# Patient Record
Sex: MALE | Race: WHITE | NOT HISPANIC OR LATINO | Employment: OTHER | URBAN - METROPOLITAN AREA
[De-identification: names, ages, dates, MRNs, and addresses within clinical notes are randomized per-mention and may not be internally consistent; named-entity substitution may affect disease eponyms.]

---

## 2017-01-22 ENCOUNTER — HOSPITAL ENCOUNTER (EMERGENCY)
Facility: CLINIC | Age: 72
Discharge: HOME | End: 2017-01-22
Attending: FAMILY MEDICINE | Admitting: FAMILY MEDICINE
Payer: MEDICARE

## 2017-01-22 ENCOUNTER — APPOINTMENT (OUTPATIENT)
Dept: CT IMAGING | Facility: CLINIC | Age: 72
End: 2017-01-22
Attending: FAMILY MEDICINE
Payer: MEDICARE

## 2017-01-22 VITALS
DIASTOLIC BLOOD PRESSURE: 79 MMHG | HEIGHT: 69 IN | BODY MASS INDEX: 33.77 KG/M2 | WEIGHT: 228 LBS | OXYGEN SATURATION: 96 % | TEMPERATURE: 98 F | SYSTOLIC BLOOD PRESSURE: 111 MMHG

## 2017-01-22 DIAGNOSIS — K21.9 GASTROESOPHAGEAL REFLUX DISEASE, ESOPHAGITIS PRESENCE NOT SPECIFIED: ICD-10-CM

## 2017-01-22 LAB
ALBUMIN SERPL-MCNC: 3.4 G/DL (ref 3.4–5)
ALP SERPL-CCNC: 79 U/L (ref 40–150)
ALT SERPL W P-5'-P-CCNC: 30 U/L (ref 0–70)
ANION GAP SERPL CALCULATED.3IONS-SCNC: 7 MMOL/L (ref 3–14)
APTT PPP: 32 SEC (ref 22–37)
AST SERPL W P-5'-P-CCNC: 17 U/L (ref 0–45)
BASOPHILS # BLD AUTO: 0 10E9/L (ref 0–0.2)
BASOPHILS NFR BLD AUTO: 0.6 %
BILIRUB SERPL-MCNC: 0.4 MG/DL (ref 0.2–1.3)
BUN SERPL-MCNC: 15 MG/DL (ref 7–30)
CALCIUM SERPL-MCNC: 9 MG/DL (ref 8.5–10.1)
CHLORIDE SERPL-SCNC: 108 MMOL/L (ref 94–109)
CO2 SERPL-SCNC: 28 MMOL/L (ref 20–32)
CREAT SERPL-MCNC: 1.02 MG/DL (ref 0.66–1.25)
CRP SERPL-MCNC: <2.9 MG/L (ref 0–8)
DIFFERENTIAL METHOD BLD: NORMAL
EOSINOPHIL # BLD AUTO: 0.2 10E9/L (ref 0–0.7)
EOSINOPHIL NFR BLD AUTO: 4.5 %
ERYTHROCYTE [DISTWIDTH] IN BLOOD BY AUTOMATED COUNT: 12.4 % (ref 10–15)
GFR SERPL CREATININE-BSD FRML MDRD: 72 ML/MIN/1.7M2
GLUCOSE SERPL-MCNC: 155 MG/DL (ref 70–99)
HCT VFR BLD AUTO: 40.4 % (ref 40–53)
HGB BLD-MCNC: 14 G/DL (ref 13.3–17.7)
IMM GRANULOCYTES # BLD: 0 10E9/L (ref 0–0.4)
IMM GRANULOCYTES NFR BLD: 0.2 %
INR PPP: 1.1 (ref 0.86–1.14)
LIPASE SERPL-CCNC: 103 U/L (ref 73–393)
LYMPHOCYTES # BLD AUTO: 1.9 10E9/L (ref 0.8–5.3)
LYMPHOCYTES NFR BLD AUTO: 35 %
MCH RBC QN AUTO: 31 PG (ref 26.5–33)
MCHC RBC AUTO-ENTMCNC: 34.7 G/DL (ref 31.5–36.5)
MCV RBC AUTO: 89 FL (ref 78–100)
MONOCYTES # BLD AUTO: 0.5 10E9/L (ref 0–1.3)
MONOCYTES NFR BLD AUTO: 8.5 %
NEUTROPHILS # BLD AUTO: 2.7 10E9/L (ref 1.6–8.3)
NEUTROPHILS NFR BLD AUTO: 51.2 %
NT-PROBNP SERPL-MCNC: 56 PG/ML (ref 0–900)
PLATELET # BLD AUTO: 176 10E9/L (ref 150–450)
POTASSIUM SERPL-SCNC: 4 MMOL/L (ref 3.4–5.3)
PROT SERPL-MCNC: 7 G/DL (ref 6.8–8.8)
RBC # BLD AUTO: 4.52 10E12/L (ref 4.4–5.9)
SODIUM SERPL-SCNC: 143 MMOL/L (ref 133–144)
TROPONIN I SERPL-MCNC: NORMAL UG/L (ref 0–0.04)
WBC # BLD AUTO: 5.3 10E9/L (ref 4–11)

## 2017-01-22 PROCEDURE — 93005 ELECTROCARDIOGRAM TRACING: CPT

## 2017-01-22 PROCEDURE — 85025 COMPLETE CBC W/AUTO DIFF WBC: CPT | Performed by: FAMILY MEDICINE

## 2017-01-22 PROCEDURE — 25500064 ZZH RX 255 OP 636: Performed by: FAMILY MEDICINE

## 2017-01-22 PROCEDURE — 99284 EMERGENCY DEPT VISIT MOD MDM: CPT | Mod: 25 | Performed by: FAMILY MEDICINE

## 2017-01-22 PROCEDURE — 80053 COMPREHEN METABOLIC PANEL: CPT | Performed by: FAMILY MEDICINE

## 2017-01-22 PROCEDURE — 93010 ELECTROCARDIOGRAM REPORT: CPT | Performed by: FAMILY MEDICINE

## 2017-01-22 PROCEDURE — 99285 EMERGENCY DEPT VISIT HI MDM: CPT | Mod: 25

## 2017-01-22 PROCEDURE — 83690 ASSAY OF LIPASE: CPT | Performed by: FAMILY MEDICINE

## 2017-01-22 PROCEDURE — 25000132 ZZH RX MED GY IP 250 OP 250 PS 637: Mod: GY | Performed by: FAMILY MEDICINE

## 2017-01-22 PROCEDURE — A9270 NON-COVERED ITEM OR SERVICE: HCPCS | Mod: GY | Performed by: FAMILY MEDICINE

## 2017-01-22 PROCEDURE — 83880 ASSAY OF NATRIURETIC PEPTIDE: CPT | Performed by: FAMILY MEDICINE

## 2017-01-22 PROCEDURE — 86140 C-REACTIVE PROTEIN: CPT | Performed by: FAMILY MEDICINE

## 2017-01-22 PROCEDURE — 84484 ASSAY OF TROPONIN QUANT: CPT | Performed by: FAMILY MEDICINE

## 2017-01-22 PROCEDURE — 85730 THROMBOPLASTIN TIME PARTIAL: CPT | Performed by: FAMILY MEDICINE

## 2017-01-22 PROCEDURE — 25000125 ZZHC RX 250: Performed by: FAMILY MEDICINE

## 2017-01-22 PROCEDURE — 71260 CT THORAX DX C+: CPT

## 2017-01-22 PROCEDURE — 85610 PROTHROMBIN TIME: CPT | Performed by: FAMILY MEDICINE

## 2017-01-22 RX ORDER — IOPAMIDOL 755 MG/ML
80 INJECTION, SOLUTION INTRAVASCULAR ONCE
Status: COMPLETED | OUTPATIENT
Start: 2017-01-22 | End: 2017-01-22

## 2017-01-22 RX ADMIN — SODIUM CHLORIDE 75 ML: 9 INJECTION, SOLUTION INTRAVENOUS at 17:35

## 2017-01-22 RX ADMIN — LIDOCAINE HYDROCHLORIDE 30 ML: 20 SOLUTION ORAL; TOPICAL at 16:49

## 2017-01-22 RX ADMIN — IOPAMIDOL 80 ML: 755 INJECTION, SOLUTION INTRAVENOUS at 17:35

## 2017-01-22 ASSESSMENT — ENCOUNTER SYMPTOMS
NEUROLOGICAL NEGATIVE: 1
BACK PAIN: 1
RESPIRATORY NEGATIVE: 1
GASTROINTESTINAL NEGATIVE: 1
EYES NEGATIVE: 1
ALLERGIC/IMMUNOLOGIC NEGATIVE: 1
CONSTITUTIONAL NEGATIVE: 1
ENDOCRINE NEGATIVE: 1
PSYCHIATRIC NEGATIVE: 1
HEMATOLOGIC/LYMPHATIC NEGATIVE: 1

## 2017-01-22 NOTE — ED AVS SNAPSHOT
[facility]    [address] [address]    Phone:  [phone]    Fax:  [phone]                                       Pancho Najera   MRN: [id number]    Department:  [facility]   Date of Visit:  1/22/2017           After Visit Summary Signature Page     I have received my discharge instructions, and my questions have been answered. I have discussed any challenges I see with this plan with the nurse or doctor.    ..........................................................................................................................................  Patient/Patient Representative Signature      ..........................................................................................................................................  Patient Representative Print Name and Relationship to Patient    ..................................................               ................................................  Date                                            Time    ..........................................................................................................................................  Reviewed by Signature/Title    ...................................................              ..............................................  Date                                                            Time

## 2017-01-22 NOTE — ED AVS SNAPSHOT
[facility]    [address] [address]    Phone:  [phone]    Fax:  [phone]                                       Pancho Najera   MRN: [id number]    Department:  [facility]   Date of Visit:  1/22/2017           Patient Information     Date Of Birth          1945        Your diagnoses for this visit were:     Gastroesophageal reflux disease, esophagitis presence not specified        You were seen by Adonay Ware MD.      Follow-up Information     Schedule an appointment as soon as possible for a visit with Baudilio Brown.    Specialty:  Family Practice    Why:  As needed, If symptoms worsen    Contact information:    [facility]  [address]  [place] [address]  [phone]          Discharge Instructions         Discharge Instructions for Gastroesophageal Reflux Disease (GERD)  Gastroesophageal reflux disease (GERD) is a backflow of acid from the stomach into the swallowing tube (esophagus).  Home care  These home care steps can help you manage GERD:    Maintain a healthy weight. Get help to lose any extra pounds.    Avoid lying down after meals.    Avoid eating late at night.    Elevate the head of your bed by 6 inches. You can do this by placing wooden blocks under the head of your bed.    Avoid wearing tight-fitting clothes.    Avoid foods that might irritate your stomach, such as the following:    Alcohol    Fat    Chocolate    Caffeine    Spearmint or peppermint    Talk to your doctor if you are taking any of the following medications. These medications can make GERD symptoms worse:    Calcium channel blockers    Theophylline    Anticholinergic medications such as oxybutynin and benzatropine    Begin an exercise program. Ask your doctor how to get started. You can benefit from simple activities, such as walking or gardening.    Break the smoking habit. Enroll in a stop-smoking program  to improve your chances of success.    Limit alcohol intake to no more than 2 drinks a day.    Take your medications exactly as directed. Don t skip doses.    Avoid over-the-counter nonsteroidal anti-inflammatory drugs, such as aspirin and ibuprofen (Advil, Motrin).    If possible, avoid nitrates (heart medications such as nitroglycerin and Isordil).  Follow-up care  Make a follow-up appointment as directed by our staff.     When to seek medical care  Call your doctor immediately if you have any of the following:    Trouble swallowing    Pain when swallowing    Feeling of food caught in your chest or throat    Pain in the neck, chest, or back    Heartburn that causes you to vomit    Vomiting blood    Black or tarry stools (from digested blood)    More saliva (watering of the mouth) than usual    Weight loss of more than 3% to 5% of your total body weight in a month    Hoarseness or sore throat that won t go away    Choking, coughing, or wheezing     4622-3722 [employer]. [address] [place] [address], [place] [address]. All rights reserved. This information is not intended as a substitute for professional medical care. Always follow your healthcare professional's instructions.      Please contact your primary care provider to arrange upper GI endoscopy as we discussed.  He may try going back on Protonix.  Return to the emergency department if worse or changes.      24 Hour Appointment Hotline       To make an appointment at any [facility], call [phone] ([phone]). If you don't have a family doctor or clinic, we will help you find one. [facility] are conveniently located to serve the needs of you and your family.             Review of your medicines      Our records show that you are taking the medicines listed below. If these are incorrect, please call your family doctor or clinic.        Dose / Directions Last dose taken    ACETYL L-CARNITINE PO   Dose:  800 mg        Take 800 mg by  mouth 2 times daily   Refills:  0        ALPHA LIPOIC ACID PO   Dose:  400 mg        Take 400 mg by mouth 2 times daily   Refills:  0        amitriptyline 25 MG tablet   Commonly known as:  ELAVIL   Dose:  12.5 mg   Quantity:  90 tablet        Take 0.5 tablets (12.5 mg) by mouth At Bedtime   Refills:  1        ANTIVERT 25 MG tablet   Dose:  25 mg   Generic drug:  meclizine        Take 25 mg by mouth 3 times daily as needed   Refills:  0        BIOTIN PO   Dose:  73344 mg        Take 10,000 mg by mouth daily   Refills:  0        clonazePAM 0.5 MG tablet   Commonly known as:  klonoPIN   Dose:  0.5 mg        Take 0.5 mg by mouth 2 times daily as needed   Refills:  0        CO Q-10 PLUS L-CARNITINE 100- MG-UNIT-MG Caps   Dose:  200 mg   Generic drug:  Levocarnitine-E-Coenzyme Q10        Take 200 mg by mouth daily   Refills:  0        CYCLOBENZAPRINE HCL PO        Refills:  0        GLIMEPIRIDE PO        Refills:  0        LIPITOR PO        Refills:  0        PROBIOTIC PO        Take by mouth daily   Refills:  0        PROTONIX PO        Refills:  0        TYLENOL PO   Dose:  500 mg        Take 500 mg by mouth 2 times daily as needed   Refills:  0        ZESTRIL PO        Refills:  0        ZOFRAN 4 MG tablet   Generic drug:  ondansetron        Take by mouth every 8 hours as needed   Refills:  0                Procedures and tests performed during your visit     CBC with platelets differential    CRP inflammation    CT Chest w Contrast    Comprehensive metabolic panel    EKG 12 lead    INR    Lipase    Nt probnp inpatient (BNP)    Partial thromboplastin time    Troponin I      Orders Needing Specimen Collection     None      Pending Results     Date and Time Order Name Status Description    1/22/2017 1636 CT Chest w Contrast Preliminary             Pending Culture Results     No orders found from 1/21/2017 to 1/23/2017.       Test Results from your hospital stay           1/22/2017  5:04 PM - Interface, Ann  Results      Component Results     Component Value Ref Range & Units Status    WBC 5.3 4.0 - 11.0 10e9/L Final    RBC Count 4.52 4.4 - 5.9 10e12/L Final    Hemoglobin 14.0 13.3 - 17.7 g/dL Final    Hematocrit 40.4 40.0 - 53.0 % Final    MCV 89 78 - 100 fl Final    MCH 31.0 26.5 - 33.0 pg Final    MCHC 34.7 31.5 - 36.5 g/dL Final    RDW 12.4 10.0 - 15.0 % Final    Platelet Count 176 150 - 450 10e9/L Final    Diff Method Automated Method  Final    % Neutrophils 51.2 % Final    % Lymphocytes 35.0 % Final    % Monocytes 8.5 % Final    % Eosinophils 4.5 % Final    % Basophils 0.6 % Final    % Immature Granulocytes 0.2 % Final    Absolute Neutrophil 2.7 1.6 - 8.3 10e9/L Final    Absolute Lymphocytes 1.9 0.8 - 5.3 10e9/L Final    Absolute Monocytes 0.5 0.0 - 1.3 10e9/L Final    Absolute Eosinophils 0.2 0.0 - 0.7 10e9/L Final    Absolute Basophils 0.0 0.0 - 0.2 10e9/L Final    Abs Immature Granulocytes 0.0 0 - 0.4 10e9/L Final         1/22/2017  5:16 PM - Interface, Flexilab Results      Component Results     Component Value Ref Range & Units Status    CRP Inflammation <2.9 0.0 - 8.0 mg/L Final         1/22/2017  4:59 PM - Interface, Flexilab Results      Component Results     Component Value Ref Range & Units Status    INR 1.10 0.86 - 1.14 Final         1/22/2017  4:59 PM - Interface, Flexilab Results      Component Results     Component Value Ref Range & Units Status    PTT 32 22 - 37 sec Final         1/22/2017  5:16 PM - Interface, Flexilab Results      Component Results     Component Value Ref Range & Units Status    Sodium 143 133 - 144 mmol/L Final    Potassium 4.0 3.4 - 5.3 mmol/L Final    Chloride 108 94 - 109 mmol/L Final    Carbon Dioxide 28 20 - 32 mmol/L Final    Anion Gap 7 3 - 14 mmol/L Final    Glucose 155 (H) 70 - 99 mg/dL Final    Urea Nitrogen 15 7 - 30 mg/dL Final    Creatinine 1.02 0.66 - 1.25 mg/dL Final    GFR Estimate 72 >60 mL/min/1.7m2 Final    Non  GFR Calc    GFR Estimate If Black  87 >60 mL/min/1.7m2 Final    African American GFR Calc    Calcium 9.0 8.5 - 10.1 mg/dL Final    Bilirubin Total 0.4 0.2 - 1.3 mg/dL Final    Albumin 3.4 3.4 - 5.0 g/dL Final    Protein Total 7.0 6.8 - 8.8 g/dL Final    Alkaline Phosphatase 79 40 - 150 U/L Final    ALT 30 0 - 70 U/L Final    AST 17 0 - 45 U/L Final         1/22/2017  5:16 PM - Interface, Flexilab Results      Component Results     Component Value Ref Range & Units Status    Lipase 103 73 - 393 U/L Final         1/22/2017  5:16 PM - Interface, Flexilab Results      Component Results     Component Value Ref Range & Units Status    Troponin I ES  0.000 - 0.045 ug/L Final    <0.015  The 99th percentile for upper reference range is 0.045 ug/L.  Troponin values in   the range of 0.045 - 0.120 ug/L may be associated with risks of adverse   clinical events.           1/22/2017  5:16 PM - Interface, Flexilab Results      Component Results     Component Value Ref Range & Units Status    N-Terminal Pro BNP Inpatient 56 0 - 900 pg/mL Final    Reference range shown and results flagged as abnormal are suggested inpatient   cut points for confirming diagnosis if CHF in an acute setting. Establishing   a   baseline value for each individual patient is useful for follow-up. An   inpatient or emergency department NT-proPBNP <300 pg/mL effectively rules out   acute CHF, with 99% negative predictive value.  The outpatient non-acute reference range for ruling out CHF is:   0-125 pg/mL (age 18 to less than 75)   0-450 pg/mL (age 75 yrs and older)           1/22/2017  5:57 PM - Interface, Radiant Ib      Narrative     CT CHEST WITH CONTRAST  1/22/2017 5:50 PM    HISTORY:  Atypical midsternal chest pain with penetration through to  the interscapular area, rule out aortic dissection.    TECHNIQUE: Scans obtained from the apices through the diaphragm with  IV contrast. 80 mL Isovue 370 injected. Radiation dose for this scan  was reduced using automated exposure control,  "adjustment of the mA  and/or kV according to patient size, or iterative reconstruction  technique.    COMPARISON:  None.    FINDINGS:  No acute thoracic aortic abnormality. No aortic dissection  identified. No evidence for pulmonary embolism. Coronary artery  calcifications identified. No effusions identified. No adenopathy or  acute airspace disease. Groundglass nodule is 0.4 cm left lower lobe  series 5 image 40. A more solid-appearing pulmonary nodule medial  right upper lobe is 0.5 cm series 5 image 32. Upper abdomen images are  negative for any acute finding. Left renal cyst noted.        Impression     IMPRESSION:   1. No acute thoracic aortic abnormality, pulmonary embolism, or acute  airspace disease. No evidence for aortic dissection.  2. A few small pulmonary nodules. See below for follow up  recommendation.    For an indeterminate lung nodule  >4-6 mm:  Low risk patients: Follow-up CT at 12 months; if unchanged, no further  follow-up.  High risk patients: Initial follow-up CT at 6-12 months then at 18-24  months if no change.    - Low Risk: Minimal or absent history of smoking and of other known  risk factors.  - Nonsolid (ground glass) or partly solid nodules may require longer  follow-up to exclude indolent adenocarcinoma.  - Fleischner Society Recommendations, Radiology 2005.                  Thank you for choosing [facility]       Thank you for choosing [facility] for your care. Our goal is always to provide you with excellent care. Hearing back from our patients is one way we can continue to improve our services. Please take a few minutes to complete the written survey that you may receive in the mail after you visit with us. Thank you!        [employer] Information     [employer] lets you send messages to your doctor, view your test results, renew your prescriptions, schedule appointments and more. To sign up, go to [email or web address] Click on \"Log in\" on the left side of the screen, which will take " "you to the Welcome page. Then click on \"Sign up Now\" on the right side of the page.     You will be asked to enter the access code listed below, as well as some personal information. Please follow the directions to create your username and password.     Your access code is: [email or web address]  Expires: 2017  7:23 PM     Your access code will  in 90 days. If you need help or a new code, please call your [facility] clinic or [phone].        Care EveryWhere ID     This is your Care EveryWhere ID. This could be used by other organizations to access your [facility] medical records  [id number]        After Visit Summary       This is your record. Keep this with you and show to your community pharmacist(s) and doctor(s) at your next visit.                  "

## 2017-01-22 NOTE — ED NOTES
"Pt states the pain is now \"pinching type\" pain and worse when he pushes on area that is painful.   "

## 2017-01-22 NOTE — ED PROVIDER NOTES
History     Chief Complaint   Patient presents with     Chest Pain     woke up with midchest pain through back that has not gone away.     HPI  Pancho Najera is a 72 year old male,  past medical history is significant for depression, hypercholesterolemia, anxiety, morbid obesity, obstructive sleep apnea, presents to the emergency department with concerns of midsternal chest pain.  History is obtained from the patient and to a lesser extent his wife, he describes central midsternal chest pain with penetration through to the back intrascapular area and also involving the left lateral aspect arm awakening him from sleep this morning at around 7:00.  He did have some milder discomfort that was similar when he went to bed last night.  The pain has been more or less consistent around 5-6/10 throughout the course of today regardless of activity or exertion.  Eating and drinking do not seem to influence it.  Over the past 2-3 weeks the patient has had intermittent but daily symptoms sometimes lasting an hour to similar but without the interscapular component that seemed to occur irrespective of exercise.  He notes that he has tried tums and baking soda for his symptoms and it does seem to help.  He notes the past he was on Protonix but discontinued that a while ago (he cannot actually remember when he stopped at) as it did not seem to work.  He states he's had problems with his stomach in the past and these symptoms are similar but he was concerned this might be his heart is was more severe today.  He has had evaluation with upper GI endoscopy approximately 2 or 3 years ago at an outside gastroenterology facility and tells me that his upper GI endoscopy was notable for findings of GERD.  No ulcer or gastritis he was told.  No malignancy concerns.   He notes no shortness of air, palpitations.  He does note some vague lightheadedness when the pain is bad.  No tingling or numbness or otherwise paresthesias.  No weakness.   He does notice some burping and belching which is not unusual for him.  His wife also notes his inconsistent use of his sleep apnea machine for his obstructive sleep apnea and she definitely notices a change in his personality, that he is less well rested, more irritable as he has not been using it recently.  He thinks it's been about a week to a week and a half since he last used his CPAP.    Active Ambulatory Problems     Diagnosis Date Noted     Anxiety state 04/17/2009     Chest pain 10/22/2010     Depression 04/17/2009     Morbid obesity (H) 03/25/2016     Obstructive sleep apnea syndrome 03/25/2016     History of respiratory system disease 10/22/2010     Pleural plaque 03/25/2016     Multiple pulmonary nodules 03/25/2016     Ventricular premature beats 10/22/2010     Ventricular bigeminy 10/22/2010     Resolved Ambulatory Problems     Diagnosis Date Noted     No Resolved Ambulatory Problems     No Additional Past Medical History     No past surgical history on file.  Social History     Social History     Marital Status:      Spouse Name: N/A     Number of Children: N/A     Years of Education: N/A     Occupational History     Not on file.     Social History Main Topics     Smoking status: Never Smoker      Smokeless tobacco: Never Used     Alcohol Use: Not on file     Drug Use: Not on file     Sexual Activity: Not on file     Other Topics Concern     Not on file     Social History Narrative     No family history on file.  No current facility-administered medications for this encounter.     Current Outpatient Prescriptions   Medication     Acetaminophen (TYLENOL PO)     Levocarnitine-E-Coenzyme Q10 (CO Q-10 PLUS L-CARNITINE) 100- MG-UNIT-MG CAPS     Acetylcarnitine HCl (ACETYL L-CARNITINE PO)     ALPHA LIPOIC ACID PO     BIOTIN PO     Probiotic Product (PROBIOTIC PO)     amitriptyline (ELAVIL) 25 MG tablet     Pantoprazole Sodium (PROTONIX PO)     ondansetron (ZOFRAN) 4 MG tablet     meclizine  (ANTIVERT) 25 MG tablet     Lisinopril (ZESTRIL PO)     GLIMEPIRIDE PO     CYCLOBENZAPRINE HCL PO     clonazePAM (KLONOPIN) 0.5 MG tablet     Atorvastatin Calcium (LIPITOR PO)        Allergies   Allergen Reactions     Sulfa Drugs        I have reviewed the Medications, Allergies, Past Medical and Surgical History, and Social History in the Epic system.    Review of Systems   Constitutional: Negative.    HENT: Negative.    Eyes: Negative.    Respiratory: Negative.    Cardiovascular: Positive for chest pain.   Gastrointestinal: Negative.    Endocrine: Negative.    Genitourinary: Negative.    Musculoskeletal: Positive for back pain.   Skin: Negative.    Allergic/Immunologic: Negative.    Neurological: Negative.    Hematological: Negative.    Psychiatric/Behavioral: Negative.        Physical Exam      Physical Exam   Constitutional: He is oriented to person, place, and time. He appears well-developed and well-nourished.   HENT:   Head: Normocephalic and atraumatic.   Right Ear: External ear normal.   Left Ear: External ear normal.   Nose: Nose normal.   Mouth/Throat: Oropharynx is clear and moist.   Eyes: Conjunctivae and EOM are normal. Pupils are equal, round, and reactive to light.   Neck: Normal range of motion. Neck supple.   Cardiovascular: Normal rate, regular rhythm, normal heart sounds and intact distal pulses.    Pulmonary/Chest: Effort normal and breath sounds normal.   Abdominal: Soft. Bowel sounds are normal.   Musculoskeletal: Normal range of motion.   Neurological: He is alert and oriented to person, place, and time.   Skin: Skin is warm and dry.   Psychiatric: He has a normal mood and affect. His behavior is normal.   Nursing note and vitals reviewed.      ED Course   Procedures             EKG Interpretation:      Interpreted by Adonay Ware  Time reviewed: 15:55  Symptoms at time of EK-610 chest pain   Rhythm: normal sinus   Rate: 68  Axis: Normal  Ectopy: none  Conduction: normal  ST  Segments/ T Waves: Nonspecific QRS widening, Q waves inferiorly.  Q Waves: III and aVf  Comparison to prior: Unchanged from 7/24/10    Clinical Impression: no acute changes      Results for orders placed or performed during the hospital encounter of 01/22/17   CT Chest w Contrast    Narrative    CT CHEST WITH CONTRAST  1/22/2017 5:50 PM    HISTORY:  Atypical midsternal chest pain with penetration through to  the interscapular area, rule out aortic dissection.    TECHNIQUE: Scans obtained from the apices through the diaphragm with  IV contrast. 80 mL Isovue 370 injected. Radiation dose for this scan  was reduced using automated exposure control, adjustment of the mA  and/or kV according to patient size, or iterative reconstruction  technique.    COMPARISON:  None.    FINDINGS:  No acute thoracic aortic abnormality. No aortic dissection  identified. No evidence for pulmonary embolism. Coronary artery  calcifications identified. No effusions identified. No adenopathy or  acute airspace disease. Groundglass nodule is 0.4 cm left lower lobe  series 5 image 40. A more solid-appearing pulmonary nodule medial  right upper lobe is 0.5 cm series 5 image 32. Upper abdomen images are  negative for any acute finding. Left renal cyst noted.      Impression    IMPRESSION:   1. No acute thoracic aortic abnormality, pulmonary embolism, or acute  airspace disease. No evidence for aortic dissection.  2. A few small pulmonary nodules. See below for follow up  recommendation.    For an indeterminate lung nodule  >4-6 mm:  Low risk patients: Follow-up CT at 12 months; if unchanged, no further  follow-up.  High risk patients: Initial follow-up CT at 6-12 months then at 18-24  months if no change.    - Low Risk: Minimal or absent history of smoking and of other known  risk factors.  - Nonsolid (ground glass) or partly solid nodules may require longer  follow-up to exclude indolent adenocarcinoma.  - Fleischner Society Recommendations,  Radiology 2005.      POWER CAMILO MD               Critical Care time:  none               Labs Ordered and Resulted from Time of ED Arrival Up to the Time of Departure from the ED   COMPREHENSIVE METABOLIC PANEL - Abnormal; Notable for the following:     Glucose 155 (*)     All other components within normal limits   CBC WITH PLATELETS DIFFERENTIAL   CRP INFLAMMATION   INR   PARTIAL THROMBOPLASTIN TIME   LIPASE   TROPONIN I   NT PROBNP INPATIENT   7:09 PM  Lab diagnostics reviewed, EKG and CT reviewed with the patient and his wife.  He noted a very definite improvement, if not near complete resolution, of his symptoms with the GI cocktail.      Assessments & Plan (with Medical Decision Making)   72-year-old male past medical history reviewed above, presentation and emergency department for evaluation of chest/epigastric discomfort over the last 2-3 weeks as described in detail in the HPI.  Physical exam finds him alert and in no acute distress with mild epigastric discomfort at the time.  Physical examination is within normal limits, vital signs within normal limits, in the presence of a 5-6/10 chest pain the patient's EKG showed no acute changes.  Lab diagnostics were within normal limits with the exception of glucose 155.  Specifically noting the cardiac troponin, BNP, CRP, CBC, lipase, LFTs are all within normal limits.  The patient had a significant response to the administration of a GI cocktail.  With most if not all of the symptoms improving.  Differential diagnosis for his presentation certainly includes ACS, pneumothorax, atypical pneumonia, pulmonary embolism, aortic dissection, etc.  For these reasons we also obtain CT imaging of his chest which revealed no acute findings.  The finding of pulmonary nodule was discussed with the patient follow-up with his primary care provider was recommended.  I think the patient's presentation today stems from long-standing GERD, it sounds as though this has been  ineffectively treated and he should follow-up with his primary care provider for consideration of repeat upper GI endoscopy as he his account has failed multiple medications to control the symptoms most recently Protonix.  I think the fact that he is not using his sleep apnea machine as directed also also contributing to his presentation today and did discuss this with him.  The patient was dispositioned home in improved condition in the care of his wife.    Disclaimer: This note consists of symbols derived from keyboarding, dictation and/or voice recognition software. As a result, there may be errors in the script that have gone undetected. Please consider this when interpreting information found in this chart.      I have reviewed the nursing notes.    I have reviewed the findings, diagnosis, plan and need for follow up with the patient.    Discharge Medication List as of 1/22/2017  7:23 PM          Final diagnoses:   Gastroesophageal reflux disease, esophagitis presence not specified       1/22/2017   [facility] EMERGENCY DEPARTMENT      Adonay Ware MD  01/27/17 0609

## 2017-01-22 NOTE — ED NOTES
"Pt c/o on/off \"achy pain\"  In epigastric area and under lt breast for past few weeks.  Today pain woke pt up and was also into the lt outer arm and through to the back.  Pt also states he feels fatigued.  Slight soa.  No n/v.    "

## 2017-01-23 NOTE — DISCHARGE INSTRUCTIONS
Discharge Instructions for Gastroesophageal Reflux Disease (GERD)  Gastroesophageal reflux disease (GERD) is a backflow of acid from the stomach into the swallowing tube (esophagus).  Home care  These home care steps can help you manage GERD:    Maintain a healthy weight. Get help to lose any extra pounds.    Avoid lying down after meals.    Avoid eating late at night.    Elevate the head of your bed by 6 inches. You can do this by placing wooden blocks under the head of your bed.    Avoid wearing tight-fitting clothes.    Avoid foods that might irritate your stomach, such as the following:    Alcohol    Fat    Chocolate    Caffeine    Spearmint or peppermint    Talk to your doctor if you are taking any of the following medications. These medications can make GERD symptoms worse:    Calcium channel blockers    Theophylline    Anticholinergic medications such as oxybutynin and benzatropine    Begin an exercise program. Ask your doctor how to get started. You can benefit from simple activities, such as walking or gardening.    Break the smoking habit. Enroll in a stop-smoking program to improve your chances of success.    Limit alcohol intake to no more than 2 drinks a day.    Take your medications exactly as directed. Don t skip doses.    Avoid over-the-counter nonsteroidal anti-inflammatory drugs, such as aspirin and ibuprofen (Advil, Motrin).    If possible, avoid nitrates (heart medications such as nitroglycerin and Isordil).  Follow-up care  Make a follow-up appointment as directed by our staff.     When to seek medical care  Call your doctor immediately if you have any of the following:    Trouble swallowing    Pain when swallowing    Feeling of food caught in your chest or throat    Pain in the neck, chest, or back    Heartburn that causes you to vomit    Vomiting blood    Black or tarry stools (from digested blood)    More saliva (watering of the mouth) than usual    Weight loss of more than 3% to 5% of your  total body weight in a month    Hoarseness or sore throat that won t go away    Choking, coughing, or wheezing     3309-1617 [employer]. [address] [place] [address], [place] [address]. All rights reserved. This information is not intended as a substitute for professional medical care. Always follow your healthcare professional's instructions.      Please contact your primary care provider to arrange upper GI endoscopy as we discussed.  He may try going back on Protonix.  Return to the emergency department if worse or changes.

## 2017-07-04 ENCOUNTER — APPOINTMENT (OUTPATIENT)
Dept: GENERAL RADIOLOGY | Facility: CLINIC | Age: 72
End: 2017-07-04
Attending: EMERGENCY MEDICINE
Payer: MEDICARE

## 2017-07-04 ENCOUNTER — HOSPITAL ENCOUNTER (EMERGENCY)
Facility: CLINIC | Age: 72
Discharge: HOME | End: 2017-07-04
Attending: EMERGENCY MEDICINE | Admitting: EMERGENCY MEDICINE
Payer: MEDICARE

## 2017-07-04 VITALS
RESPIRATION RATE: 18 BRPM | OXYGEN SATURATION: 98 % | BODY MASS INDEX: 32.19 KG/M2 | TEMPERATURE: 98 F | WEIGHT: 218 LBS | SYSTOLIC BLOOD PRESSURE: 108 MMHG | DIASTOLIC BLOOD PRESSURE: 68 MMHG

## 2017-07-04 DIAGNOSIS — T14.90XA INHALATION INJURY: ICD-10-CM

## 2017-07-04 DIAGNOSIS — R06.02 SOB (SHORTNESS OF BREATH): ICD-10-CM

## 2017-07-04 LAB
ALBUMIN SERPL-MCNC: 3.7 G/DL (ref 3.4–5)
ALP SERPL-CCNC: 98 U/L (ref 40–150)
ALT SERPL W P-5'-P-CCNC: 25 U/L (ref 0–70)
ANION GAP SERPL CALCULATED.3IONS-SCNC: 6 MMOL/L (ref 3–14)
AST SERPL W P-5'-P-CCNC: 15 U/L (ref 0–45)
BASE EXCESS BLDV CALC-SCNC: 3.1 MMOL/L
BASOPHILS # BLD AUTO: 0 10E9/L (ref 0–0.2)
BASOPHILS NFR BLD AUTO: 0.2 %
BILIRUB SERPL-MCNC: 0.7 MG/DL (ref 0.2–1.3)
BUN SERPL-MCNC: 19 MG/DL (ref 7–30)
CALCIUM SERPL-MCNC: 9.5 MG/DL (ref 8.5–10.1)
CHLORIDE SERPL-SCNC: 104 MMOL/L (ref 94–109)
CO2 SERPL-SCNC: 27 MMOL/L (ref 20–32)
CREAT SERPL-MCNC: 1.18 MG/DL (ref 0.66–1.25)
CRP SERPL-MCNC: 34.2 MG/L (ref 0–8)
DIFFERENTIAL METHOD BLD: NORMAL
EOSINOPHIL # BLD AUTO: 0.2 10E9/L (ref 0–0.7)
EOSINOPHIL NFR BLD AUTO: 2.1 %
ERYTHROCYTE [DISTWIDTH] IN BLOOD BY AUTOMATED COUNT: 12.4 % (ref 10–15)
GFR SERPL CREATININE-BSD FRML MDRD: 61 ML/MIN/1.7M2
GLUCOSE SERPL-MCNC: 140 MG/DL (ref 70–99)
HCO3 BLDV-SCNC: 29 MMOL/L (ref 21–28)
HCT VFR BLD AUTO: 40.8 % (ref 40–53)
HGB BLD-MCNC: 14.1 G/DL (ref 13.3–17.7)
IMM GRANULOCYTES # BLD: 0 10E9/L (ref 0–0.4)
IMM GRANULOCYTES NFR BLD: 0.1 %
LYMPHOCYTES # BLD AUTO: 2.2 10E9/L (ref 0.8–5.3)
LYMPHOCYTES NFR BLD AUTO: 22.1 %
MCH RBC QN AUTO: 31.5 PG (ref 26.5–33)
MCHC RBC AUTO-ENTMCNC: 34.6 G/DL (ref 31.5–36.5)
MCV RBC AUTO: 91 FL (ref 78–100)
MONOCYTES # BLD AUTO: 1.1 10E9/L (ref 0–1.3)
MONOCYTES NFR BLD AUTO: 11.2 %
NEUTROPHILS # BLD AUTO: 6.5 10E9/L (ref 1.6–8.3)
NEUTROPHILS NFR BLD AUTO: 64.3 %
PCO2 BLDV: 48 MM HG (ref 40–50)
PH BLDV: 7.39 PH (ref 7.32–7.43)
PLATELET # BLD AUTO: 191 10E9/L (ref 150–450)
PO2 BLDV: 33 MM HG (ref 25–47)
POTASSIUM SERPL-SCNC: 4.1 MMOL/L (ref 3.4–5.3)
PROT SERPL-MCNC: 7.9 G/DL (ref 6.8–8.8)
RBC # BLD AUTO: 4.47 10E12/L (ref 4.4–5.9)
SODIUM SERPL-SCNC: 137 MMOL/L (ref 133–144)
TROPONIN I SERPL-MCNC: NORMAL UG/L (ref 0–0.04)
WBC # BLD AUTO: 10.1 10E9/L (ref 4–11)

## 2017-07-04 PROCEDURE — 71020 XR CHEST 2 VW: CPT

## 2017-07-04 PROCEDURE — 86140 C-REACTIVE PROTEIN: CPT | Performed by: EMERGENCY MEDICINE

## 2017-07-04 PROCEDURE — 40000275 ZZH STATISTIC RCP TIME EA 10 MIN

## 2017-07-04 PROCEDURE — 25000125 ZZHC RX 250: Performed by: EMERGENCY MEDICINE

## 2017-07-04 PROCEDURE — 82803 BLOOD GASES ANY COMBINATION: CPT | Performed by: EMERGENCY MEDICINE

## 2017-07-04 PROCEDURE — 96374 THER/PROPH/DIAG INJ IV PUSH: CPT

## 2017-07-04 PROCEDURE — 99284 EMERGENCY DEPT VISIT MOD MDM: CPT | Mod: 25

## 2017-07-04 PROCEDURE — 84484 ASSAY OF TROPONIN QUANT: CPT | Performed by: EMERGENCY MEDICINE

## 2017-07-04 PROCEDURE — 94640 AIRWAY INHALATION TREATMENT: CPT

## 2017-07-04 PROCEDURE — 80053 COMPREHEN METABOLIC PANEL: CPT | Performed by: EMERGENCY MEDICINE

## 2017-07-04 PROCEDURE — 96361 HYDRATE IV INFUSION ADD-ON: CPT | Mod: 59

## 2017-07-04 PROCEDURE — 85025 COMPLETE CBC W/AUTO DIFF WBC: CPT | Performed by: EMERGENCY MEDICINE

## 2017-07-04 PROCEDURE — 99284 EMERGENCY DEPT VISIT MOD MDM: CPT | Performed by: EMERGENCY MEDICINE

## 2017-07-04 PROCEDURE — 25000128 H RX IP 250 OP 636: Performed by: EMERGENCY MEDICINE

## 2017-07-04 RX ORDER — SODIUM CHLORIDE 9 MG/ML
1000 INJECTION, SOLUTION INTRAVENOUS CONTINUOUS
Status: DISCONTINUED | OUTPATIENT
Start: 2017-07-04 | End: 2017-07-04 | Stop reason: HOSPADM

## 2017-07-04 RX ORDER — PREDNISONE 10 MG/1
TABLET ORAL
Qty: 32 TABLET | Refills: 0 | Status: SHIPPED | OUTPATIENT
Start: 2017-07-04 | End: 2017-07-14

## 2017-07-04 RX ORDER — KETOROLAC TROMETHAMINE 30 MG/ML
15 INJECTION, SOLUTION INTRAMUSCULAR; INTRAVENOUS ONCE
Status: COMPLETED | OUTPATIENT
Start: 2017-07-04 | End: 2017-07-04

## 2017-07-04 RX ORDER — IPRATROPIUM BROMIDE AND ALBUTEROL SULFATE 2.5; .5 MG/3ML; MG/3ML
3 SOLUTION RESPIRATORY (INHALATION) ONCE
Status: COMPLETED | OUTPATIENT
Start: 2017-07-04 | End: 2017-07-04

## 2017-07-04 RX ORDER — ALBUTEROL SULFATE 90 UG/1
2 AEROSOL, METERED RESPIRATORY (INHALATION) EVERY 4 HOURS PRN
Qty: 1 INHALER | Refills: 0 | Status: SHIPPED | OUTPATIENT
Start: 2017-07-04 | End: 2021-09-02

## 2017-07-04 RX ADMIN — IPRATROPIUM BROMIDE AND ALBUTEROL SULFATE 3 ML: .5; 3 SOLUTION RESPIRATORY (INHALATION) at 13:42

## 2017-07-04 RX ADMIN — SODIUM CHLORIDE 1000 ML: 9 INJECTION, SOLUTION INTRAVENOUS at 13:40

## 2017-07-04 RX ADMIN — KETOROLAC TROMETHAMINE 15 MG: 30 INJECTION, SOLUTION INTRAMUSCULAR at 13:40

## 2017-07-04 NOTE — ED AVS SNAPSHOT
[facility] Emergency Department    [address]    [place] [address]    Phone:  [phone]    Fax:  [phone]                                       Pancho Najera   MRN: [id number]    Department:  [facility] Emergency Department   Date of Visit:  7/4/2017           Patient Information     Date Of Birth          1945        Your diagnoses for this visit were:     SOB (shortness of breath)     Inhalation injury        You were seen by Valentino Pathak MD.      Follow-up Information     Follow up with Baudilio Brown.    Specialty:  Family Practice    Contact information:    [facility]  [address]  [place] [address]  [phone]        Discharge References/Attachments     SHORTNESS OF BREATH (DYSPNEA) (ENGLISH)      24 Hour Appointment Hotline       To make an appointment at any [facility] clinic, call [phone] ([phone]). If you don't have a family doctor or clinic, we will help you find one. [facility] clinics are conveniently located to serve the needs of you and your family.             Review of your medicines      START taking        Dose / Directions Last dose taken    albuterol 108 (90 BASE) MCG/ACT Inhaler   Commonly known as:  albuterol   Dose:  2 puff   Quantity:  1 Inhaler        Inhale 2 puffs into the lungs every 4 hours as needed for shortness of breath / dyspnea   Refills:  0        predniSONE 10 MG tablet   Commonly known as:  DELTASONE   Quantity:  32 tablet        Take 4 tablets daily for 5 days,  take 2 tablets daily for 3 days, take 1 tablet daily for 3 days, take half a tablet for 3 days.   Refills:  0          Our records show that you are taking the medicines listed below. If these are incorrect, please call your family doctor or clinic.        Dose / Directions Last dose taken    clonazePAM 0.5 MG tablet   Commonly known as:  klonoPIN   Dose:  0.5 mg        Take 0.5 mg by mouth 3 times daily as needed   Refills:  0         GLIMEPIRIDE PO   Dose:  1 mg        Take 1 mg by mouth 2 times daily (before meals)   Refills:  0        LIPITOR PO   Dose:  10 mg        Take 10 mg by mouth daily   Refills:  0        PROTONIX PO   Dose:  40 mg        Take 40 mg by mouth every morning (before breakfast)   Refills:  0        TYLENOL PO   Dose:  650 mg        Take 650 mg by mouth every 4 hours as needed   Refills:  0        ZESTRIL PO   Dose:  5 mg        Take 5 mg by mouth daily   Refills:  0                Prescriptions were sent or printed at these locations (2 Prescriptions)                   [facility] [place], [place] - [address]   [address] [address]    Telephone:  [phone]   Fax:  [phone]   Hours:                  E-Prescribed (2 of 2)         predniSONE (DELTASONE) 10 MG tablet               albuterol (ALBUTEROL) 108 (90 BASE) MCG/ACT Inhaler                Procedures and tests performed during your visit     Blood gas venous    CBC with platelets differential    CRP inflammation    Comprehensive metabolic panel    Troponin I    XR Chest 2 Views      Orders Needing Specimen Collection     None      Pending Results     Date and Time Order Name Status Description    7/4/2017 1325 XR Chest 2 Views Preliminary             Pending Culture Results     No orders found from 7/2/2017 to 7/5/2017.            Pending Results Instructions     If you had any lab results that were not finalized at the time of your Discharge, you can call the ED Lab Result RN at [phone]. You will be contacted by this team for any positive Lab results or changes in treatment. The nurses are available 7 days a week from 10A to 6:30P.  You can leave a message 24 hours per day and they will return your call.        Test Results From Your Hospital Stay        7/4/2017  1:41 PM      Component Results     Component Value Ref Range & Units Status    WBC 10.1 4.0 - 11.0 10e9/L Final    RBC Count 4.47 4.4 - 5.9 10e12/L Final     Hemoglobin 14.1 13.3 - 17.7 g/dL Final    Hematocrit 40.8 40.0 - 53.0 % Final    MCV 91 78 - 100 fl Final    MCH 31.5 26.5 - 33.0 pg Final    MCHC 34.6 31.5 - 36.5 g/dL Final    RDW 12.4 10.0 - 15.0 % Final    Platelet Count 191 150 - 450 10e9/L Final    Diff Method Automated Method  Final    % Neutrophils 64.3 % Final    % Lymphocytes 22.1 % Final    % Monocytes 11.2 % Final    % Eosinophils 2.1 % Final    % Basophils 0.2 % Final    % Immature Granulocytes 0.1 % Final    Absolute Neutrophil 6.5 1.6 - 8.3 10e9/L Final    Absolute Lymphocytes 2.2 0.8 - 5.3 10e9/L Final    Absolute Monocytes 1.1 0.0 - 1.3 10e9/L Final    Absolute Eosinophils 0.2 0.0 - 0.7 10e9/L Final    Absolute Basophils 0.0 0.0 - 0.2 10e9/L Final    Abs Immature Granulocytes 0.0 0 - 0.4 10e9/L Final         7/4/2017  1:59 PM      Component Results     Component Value Ref Range & Units Status    Sodium 137 133 - 144 mmol/L Final    Potassium 4.1 3.4 - 5.3 mmol/L Final    Chloride 104 94 - 109 mmol/L Final    Carbon Dioxide 27 20 - 32 mmol/L Final    Anion Gap 6 3 - 14 mmol/L Final    Glucose 140 (H) 70 - 99 mg/dL Final    Urea Nitrogen 19 7 - 30 mg/dL Final    Creatinine 1.18 0.66 - 1.25 mg/dL Final    GFR Estimate 61 >60 mL/min/1.7m2 Final    Non  GFR Calc    GFR Estimate If Black 74 >60 mL/min/1.7m2 Final    African American GFR Calc    Calcium 9.5 8.5 - 10.1 mg/dL Final    Bilirubin Total 0.7 0.2 - 1.3 mg/dL Final    Albumin 3.7 3.4 - 5.0 g/dL Final    Protein Total 7.9 6.8 - 8.8 g/dL Final    Alkaline Phosphatase 98 40 - 150 U/L Final    ALT 25 0 - 70 U/L Final    AST 15 0 - 45 U/L Final         7/4/2017  1:59 PM      Component Results     Component Value Ref Range & Units Status    Troponin I ES  0.000 - 0.045 ug/L Final    <0.015  The 99th percentile for upper reference range is 0.045 ug/L.  Troponin values in   the range of 0.045 - 0.120 ug/L may be associated with risks of adverse   clinical events.           7/4/2017  2:10  "PM      Component Results     Component Value Ref Range & Units Status    Ph Venous 7.39 7.32 - 7.43 pH Final    PCO2 Venous 48 40 - 50 mm Hg Final    PO2 Venous 33 25 - 47 mm Hg Final    Bicarbonate Venous 29 (H) 21 - 28 mmol/L Final    Base Excess Venous 3.1 mmol/L Final    Abnormal Result, Ref range: -7.7 to 1.9         2017  1:59 PM      Component Results     Component Value Ref Range & Units Status    CRP Inflammation 34.2 (H) 0.0 - 8.0 mg/L Final         2017  2:08 PM      Narrative     CHEST TWO VIEWS 2017 2:05 PM     HISTORY: Exposure to toxic gas with cough and shortness of  breath/history of pleural plaque from asbestosis.    COMPARISON: None.        Impression     IMPRESSION: No acute cardiopulmonary disease.                Thank you for choosing [facility]       Thank you for choosing [facility] for your care. Our goal is always to provide you with excellent care. Hearing back from our patients is one way we can continue to improve our services. Please take a few minutes to complete the written survey that you may receive in the mail after you visit with us. Thank you!        [employer] Information     [employer] lets you send messages to your doctor, view your test results, renew your prescriptions, schedule appointments and more. To sign up, go to [email or web address] . Click on \"Log in\" on the left side of the screen, which will take you to the Welcome page. Then click on \"Sign up Now\" on the right side of the page.     You will be asked to enter the access code listed below, as well as some personal information. Please follow the directions to create your username and password.     Your access code is: [email or web address]  Expires: 10/2/2017  2:33 PM     Your access code will  in 90 days. If you need help or a new code, please call your [facility] clinic or [phone].        Care EveryWhere ID     This is your Care EveryWhere ID. This could be used by other organizations to access your " [facility] medical records  [id number]        Equal Access to Services     CLAIR LORD : Hadii sussy Chappell, silvana tai, ruel whitmore, william strickland. So [facility] [phone].    ATENCIÓN: Si habla español, tiene a louis disposición servicios gratuitos de asistencia lingüística. Llame al [phone].    We comply with applicable federal civil rights laws and [place] laws. We do not discriminate on the basis of race, color, national origin, age, disability sex, sexual orientation or gender identity.            After Visit Summary       This is your record. Keep this with you and show to your community pharmacist(s) and doctor(s) at your next visit.

## 2017-07-04 NOTE — ED AVS SNAPSHOT
[facility] Emergency Department    [address] [address]    Phone:  [phone]    Fax:  [phone]                                       Pancho Najera   MRN: [id number]    Department:  [facility] Emergency Department   Date of Visit:  7/4/2017           After Visit Summary Signature Page     I have received my discharge instructions, and my questions have been answered. I have discussed any challenges I see with this plan with the nurse or doctor.    ..........................................................................................................................................  Patient/Patient Representative Signature      ..........................................................................................................................................  Patient Representative Print Name and Relationship to Patient    ..................................................               ................................................  Date                                            Time    ..........................................................................................................................................  Reviewed by Signature/Title    ...................................................              ..............................................  Date                                                            Time

## 2017-07-04 NOTE — ED PROVIDER NOTES
History     Chief Complaint   Patient presents with     Toxic Inhalation     boric acid, was used in his home by him on Sunday and then yesterday he washed everything down with bleach to get rid of the boric acid due to company coming today, now having sore throat, ear pain, h/a and rough voice with burning into his chest      HPI  Pancho Najera is a 72 year old male who presents with complaints of ear pain, nasal congestion, sore throat, and burning in his chest.  Patient has associated headache and cough which is productive of clear sputum.  No fever or chills.  His abdominal pain, nausea or vomiting.  Symptoms began on Sunday after he is exposed to a chemical gas.  Patient apparently used boric acid to eliminate ants.  He subsequently used bleach to eliminate the boric acid as they were having company.  He unfortunately inhaled some of the gas that was produced and now presents for evaluation.  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  Denies history of pneumonia.  He has not taken anything for his symptoms prior to arrival.  He contacted poison control and they recommended he be evaluated and possibly treated for his breathing issues.  In addition to his pleural plaques he has sleep apnea, ventricular bigeminy, anxiety, and previous evaluation for chest pain.    I have reviewed the Medications, Allergies, Past Medical and Surgical History, and Social History in the Epic system.    Review of Systems all other systems were reviewed and are negative.  History reviewed. No pertinent past medical history.  Patient Active Problem List   Diagnosis     Anxiety state     Chest pain     Depression     Morbid obesity (H)     Obstructive sleep apnea syndrome     History of respiratory system disease     Pleural plaque     Multiple pulmonary nodules     Ventricular premature beats     Ventricular bigeminy     Current Facility-Administered Medications   Medication      0.9% sodium chloride infusion     Current Outpatient Prescriptions   Medication     predniSONE (DELTASONE) 10 MG tablet     albuterol (ALBUTEROL) 108 (90 BASE) MCG/ACT Inhaler     Acetaminophen (TYLENOL PO)     Pantoprazole Sodium (PROTONIX PO)     Lisinopril (ZESTRIL PO)     GLIMEPIRIDE PO     clonazePAM (KLONOPIN) 0.5 MG tablet     Atorvastatin Calcium (LIPITOR PO)        Allergies   Allergen Reactions     Sulfa Drugs      Social History     Social History     Marital status:      Spouse name: N/A     Number of children: N/A     Years of education: N/A     Occupational History     Not on file.     Social History Main Topics     Smoking status: Never Smoker     Smokeless tobacco: Never Used     Alcohol use Not on file     Drug use: Not on file     Sexual activity: Not on file     Other Topics Concern     Not on file     Social History Narrative     No family history on file.        Physical Exam   BP: 108/68  Heart Rate: 74  Temp: 98  F (36.7  C)  Resp: (!) 77  Weight: 98.9 kg (218 lb)  SpO2: 97 %  Physical Exam Gen. alert cooperative male in mild to moderate distress.  Does have a nonproductive cough during exam.  HEENT shows no obvious facial rash or swelling.  His right TM is normal.  The left TM is partially visualized due to cerumen and that appears normal.  Nasal passes are boggy and swollen near occlusion.  Clear drainage bilaterally.  Moist mucosa.  Speech is clear.  He's got postnasal drip.  He is able to handle secretions.  Neck is supple without stridor.  Lungs reveal rare basilar crackles but no other adventitious sounds.  Cardiac regular rate without murmur.    ED Course     ED Course     Procedures        Results for orders placed or performed during the hospital encounter of 07/04/17   XR Chest 2 Views    Narrative    CHEST TWO VIEWS July 4, 2017 2:05 PM     HISTORY: Exposure to toxic gas with cough and shortness of  breath/history of pleural plaque from asbestosis.    COMPARISON: None.       Impression    IMPRESSION: No acute cardiopulmonary disease.          Critical Care time:  none               Labs Ordered and Resulted from Time of ED Arrival Up to the Time of Departure from the ED   COMPREHENSIVE METABOLIC PANEL - Abnormal; Notable for the following:        Result Value    Glucose 140 (*)     All other components within normal limits   BLOOD GAS VENOUS - Abnormal; Notable for the following:     Bicarbonate Venous 29 (*)     All other components within normal limits   CRP INFLAMMATION - Abnormal; Notable for the following:     CRP Inflammation 34.2 (*)     All other components within normal limits   CBC WITH PLATELETS DIFFERENTIAL   TROPONIN I     Patient was given a DuoNeb with improvement.  Assessments & Plan (with Medical Decision Making)   Patient is a 72-year-old presents with treated history of ear pain, nasal congestion, sore throat, and cough with burning in his chest.  On Sunday patient was exposed to a toxic gas from mixture of boric acid and bleach.  He does have a history of asbestosis and pleural plaques.  He currently has a cough that is productive of clear sputum.  He has no fever or chills.  No chest pain.  He is able to speak in complete sentences and able to handle secretions.  On exam he had normal TMs with the left being partially visualized.  Nasal swelling near occlusion with clear discharge.  Postnasal drip on oral exam.  Neck was supple without stridor.  Lungs revealed no wheezing.  Patient was given a DuoNeb with improvement.  A chest x-ray showed no acute abnormality.  Blood work was unremarkable including troponin and his venous blood gas.  CRP was mildly elevated at 34.2 Suspect he has irritation from the gas exposure.  He'll be given prednisone burst and taper.  He understands this can cause GI upset and insomnia.  He'll be given an inhaler as it seemed to benefit to use as needed.  He understands this can cause his heart duration and cause him to be jittery.  She is  given had recurrent dyspnea.  Reasons to return to the emergency room for reassessment were discussed.  I have reviewed the nursing notes.    I have reviewed the findings, diagnosis, plan and need for follow up with the patient.       New Prescriptions    ALBUTEROL (ALBUTEROL) 108 (90 BASE) MCG/ACT INHALER    Inhale 2 puffs into the lungs every 4 hours as needed for shortness of breath / dyspnea    PREDNISONE (DELTASONE) 10 MG TABLET    Take 4 tablets daily for 5 days,  take 2 tablets daily for 3 days, take 1 tablet daily for 3 days, take half a tablet for 3 days.       Final diagnoses:   SOB (shortness of breath)   Inhalation injury       7/4/2017   [facility] EMERGENCY DEPARTMENT     Valentino Pathak MD  07/04/17 [id number]       Valentino Pathak MD  07/04/17 [id number]

## 2021-05-14 ENCOUNTER — TRANSFERRED RECORDS (OUTPATIENT)
Dept: MULTI SPECIALTY CLINIC | Facility: CLINIC | Age: 76
End: 2021-05-14

## 2021-05-14 LAB
ALBUMIN (URINE) MG/SPEC: 0.6 MG/DL
ALBUMIN/CREATININE RATIO: 5.9 MG/G CREAT
ALT SERPL-CCNC: 38 IU/L (ref 12–68)
AST SERPL-CCNC: 17 IU/L (ref 15–37)
CHOLESTEROL (EXTERNAL): 136 MG/DL
CREATININE (EXTERNAL): 1.27 MG/DL (ref 0.8–1.3)
CREATININE (URINE): 101 MG/DL
GFR ESTIMATED (EXTERNAL): 55 ML/MIN
GFR ESTIMATED (IF AFRICAN AMERICAN) (EXTERNAL): >60 ML/MIN
GLUCOSE (EXTERNAL): 85 MG/DL (ref 70–110)
HBA1C MFR BLD: 6.7 %
HDLC SERPL-MCNC: 51 MG/DL
LDL CHOLESTEROL (EXTERNAL): 68 MG/DL
POTASSIUM (EXTERNAL): 4.5 MMOL/L (ref 3.5–5.1)
TRIGLYCERIDES (EXTERNAL): 84 MG/DL

## 2021-08-19 ENCOUNTER — TELEPHONE (OUTPATIENT)
Dept: OTOLARYNGOLOGY | Facility: CLINIC | Age: 76
End: 2021-08-19

## 2021-08-19 NOTE — TELEPHONE ENCOUNTER
"Spoke to patient and advised to see his PCP as he has not been vaccinated for Covid and has lung issues and he is reporting \"a cough for about 2 months and my cheek started to look swollen in the back by my ear a couple weeks ago. About a week ago, it was really swollen and it hurts into my neck, eye ear and into my neck. I have had tonsil problems before, so I gargled with salt water and it got bigger and bigger and there was a big lump in my cheek and it was painful. I have been taking Advil, and it feels like that has helped..\"     I asked if he had seen his FP Provider and he stated: \"I don't have much naman in him..\"     \"I went over to Urgent care at [facility] at 12 noon and they said it would be a 45 minute wait and I could not wait 45 minutes, so I cancelled and said I would wait until [facility] called me back.\"    I did advise patient be seen in Urgent care today and unfortunately it can take several hours to be seen at an Urgent care center or Emergency room.     Patient verbalized understanding. Norma Terry RN        "

## 2021-08-19 NOTE — TELEPHONE ENCOUNTER
Patient reports that he has had a cough and sore throat for past few weeks. He does have an appointment with ENT on 9/3/21. He call today to report that his pain on the right side of his face is worsening. He has swelling near his right eye and into his ear. He can feel a lump in the back part of his jaw. Face is very painful. He has been taking advil but does not relieve pain much. He does salt water gargle and rinse with some relief.  Advised to be seen in the UC/ED. Patient did not want to be seen there. He wants to be on a cancellation list to be seen in ENT. Will route message to ENT staff. Patient would like a call back at [phone].  Sil Aiken RN

## 2021-08-20 ENCOUNTER — HOSPITAL ENCOUNTER (OUTPATIENT)
Dept: CT IMAGING | Facility: CLINIC | Age: 76
Discharge: HOME | End: 2021-08-20
Attending: NURSE PRACTITIONER | Admitting: NURSE PRACTITIONER
Payer: COMMERCIAL

## 2021-08-20 ENCOUNTER — OFFICE VISIT (OUTPATIENT)
Dept: FAMILY MEDICINE | Facility: CLINIC | Age: 76
End: 2021-08-20
Payer: COMMERCIAL

## 2021-08-20 VITALS
DIASTOLIC BLOOD PRESSURE: 66 MMHG | HEART RATE: 73 BPM | RESPIRATION RATE: 16 BRPM | OXYGEN SATURATION: 97 % | SYSTOLIC BLOOD PRESSURE: 112 MMHG | TEMPERATURE: 96 F

## 2021-08-20 DIAGNOSIS — R05.9 COUGH: ICD-10-CM

## 2021-08-20 DIAGNOSIS — K11.20 SUPPURATIVE PAROTITIS: Primary | ICD-10-CM

## 2021-08-20 LAB
CREAT BLD-MCNC: 1.3 MG/DL (ref 0.7–1.3)
GFR SERPL CREATININE-BSD FRML MDRD: 53 ML/MIN/1.73M2

## 2021-08-20 PROCEDURE — 70491 CT SOFT TISSUE NECK W/DYE: CPT

## 2021-08-20 PROCEDURE — 99204 OFFICE O/P NEW MOD 45 MIN: CPT | Performed by: NURSE PRACTITIONER

## 2021-08-20 PROCEDURE — 82565 ASSAY OF CREATININE: CPT

## 2021-08-20 PROCEDURE — 250N000011 HC RX IP 250 OP 636: Performed by: NURSE PRACTITIONER

## 2021-08-20 PROCEDURE — 250N000009 HC RX 250: Performed by: NURSE PRACTITIONER

## 2021-08-20 RX ORDER — PREDNISONE 20 MG/1
40 TABLET ORAL DAILY
Qty: 8 TABLET | Refills: 0 | Status: SHIPPED | OUTPATIENT
Start: 2021-08-20 | End: 2021-08-24

## 2021-08-20 RX ORDER — IOPAMIDOL 755 MG/ML
80 INJECTION, SOLUTION INTRAVASCULAR ONCE
Status: COMPLETED | OUTPATIENT
Start: 2021-08-20 | End: 2021-08-20

## 2021-08-20 RX ORDER — OMEGA-3 FATTY ACIDS/FISH OIL 300-1000MG
200 CAPSULE ORAL EVERY 4 HOURS PRN
COMMUNITY

## 2021-08-20 RX ADMIN — IOPAMIDOL 80 ML: 755 INJECTION, SOLUTION INTRAVENOUS at 08:46

## 2021-08-20 RX ADMIN — SODIUM CHLORIDE 80 ML: 9 INJECTION, SOLUTION INTRAVENOUS at 08:46

## 2021-08-20 NOTE — PATIENT INSTRUCTIONS
"Take Augmentin twice daily with food. Take a probiotic such as Culturelle or Florastor (recommend lactobacillus 50 billion CFU twice daily  by at least one hour from the antibiotic) while on the antibiotic or eat a Greek yogurt containing \"live active cultures\" daily.    Take prednisone daily for 4 days    Prednisone Discharge Instructions:  Please take the steroid, Prednisone, for the full course as prescribed.  Take Prednisone with food or milk to minimize stomach upset.      Side effects of Prednisone include difficulty sleeping, increased appetite, weight gain, and changes in mood.  If you are diabetic, please monitor your blood sugar regularly while taking this medicine as Prednisone can cause high blood sugar.    Follow up with Dr. Alonso as planned.    "

## 2021-08-20 NOTE — PROGRESS NOTES
"    Assessment & Plan     Suppurative parotitis  Given significant TTP and swelling of face, I did obtain stat CT of the soft tissue of the neck. Received call from radiology with concern for parotid abscess vs concern for malignancy. Given the acute onset of these symptoms over the past week, I have less concern for malignancy. He is well appearing and afebrile. I did discuss the case with Dr. Alonso, ENT, who was kind enough to review the images with me today.  He feels after review of the images that patient is appropriate for outpatient management. The patient had previously scheduled an appointment with Dr. Alonso for 9/3 to discuss these symptoms, but as it acutely worsened over the past couple days, he decided to see me today instead. He will keep his 9/3 appointment for recheck. We will start him on Augmentin today, as well as prednisone. We discussed sialagogues, s/s worsening illness as well as reasons to seek emergent care.   - CT Soft Tissue Neck w Contrast  - amoxicillin-clavulanate (AUGMENTIN) 875-125 MG tablet; Take 1 tablet by mouth 2 times daily for 10 days  - predniSONE (DELTASONE) 20 MG tablet; Take 2 tablets (40 mg) by mouth daily for 4 days    Cough  Ongoing for years, certainly may be related to lisinopril. No heartburn symptoms to think this may be GERD. No infectious symptoms, unlikely to be pneumonia. May improve on prednisone, if not follow up with PCP.       Patient Instructions   Take Augmentin twice daily with food. Take a probiotic such as Culturelle or Florastor (recommend lactobacillus 50 billion CFU twice daily  by at least one hour from the antibiotic) while on the antibiotic or eat a Greek yogurt containing \"live active cultures\" daily.    Take prednisone daily for 4 days    Prednisone Discharge Instructions:  Please take the steroid, Prednisone, for the full course as prescribed.  Take Prednisone with food or milk to minimize stomach upset.      Side effects of Prednisone " "include difficulty sleeping, increased appetite, weight gain, and changes in mood.  If you are diabetic, please monitor your blood sugar regularly while taking this medicine as Prednisone can cause high blood sugar.    Follow up with Dr. Alonso as planned.        Return in about 1 week (around 8/27/2021).    CHELSEY Garcia CNP  [facility]ET[facility] Deleon is a 76 year old who presents for the following health issues     HPI       Yesterday Telephone msg - RN triaged by Norma Terry : \"a cough for about 2 months and my cheek started to look swollen in the back by my ear a couple weeks ago. About a week ago, it was really swollen and it hurts into my neck, eye ear and into my neck. I have had tonsil problems before, so I gargled with salt water and it got bigger and bigger and there was a big lump in my cheek and it was painful. I have been taking Advil, and it feels like that has helped..\" \"    Not vaccinated for Covid    Acute Illness  Acute illness concerns: Chronic Cough  Onset/Duration: 2 months  Symptoms:  Fever: no  Chills/Sweats: no  Headache (location?): no  Sinus Pressure: YES  Conjunctivitis:  YES  Ear Pain: YES: right  Rhinorrhea: no  Congestion: no  Sore Throat: YES- Right side - some swollen on the right side from outside   Cough: YES  Wheeze: no  Loss of taste or smell: no   Chest pain/pressure: somewhat  Decreased Appetite: YES  Nausea: no  Vomiting: no  Diarrhea: YES- somewhat sometimes  Dysuria/Freq.: no  Dysuria or Hematuria: no  Fatigue/Achiness: no  Sick/Strep Exposure: no  Therapies tried and outcome: None  Concern - Derm problem  Onset: 2 weeks ago   Description: Noticed it was bite by some bug on the right lower side - there was a big rash, not has subsided - that is when the swollen on the face start to happen. - the rash on the lower side is almost gone now.  Intensity: mild  Progression of Symptoms:  improving and constant  Accompanying Signs & " "Symptoms: na  Previous history of similar problem: na  Precipitating factors:        Worsened by: na  Alleviating factors:        Improved by: na  Therapies tried and outcome:  none     Above HPI reviewed. Additionally, has chronic cough which he attributes to lisinopril. He takes 5mg daily. Notes his cough has worsened recently, notes it has been \"harsher\", and more frequent. It is non productive. No shortness of breath, no dyspnea on exertion, no lower extremity swelling. No chest pain. No fevers. This has been ongoing for at least 2 months.     Secondly, he notes that his \"right tonsil\" is swollen and has now causes a lump to his outer right cheek and swelling to his neck. It is very painful, however he can still swallow and chew. Again, he has not had fevers. Has had some congestion, no rhinorrhea. Does have some right ear pain, right facial pain.     Third, he notes that he was bitten by an insect last week that caused a rash on his right flank and wonders if this could be causing the facial swelling. The rash is now gone.    Review of Systems   Constitutional, HEENT, cardiovascular, pulmonary, gi and gu systems are negative, except as otherwise noted.      Objective    /66   Pulse 73   Temp (!) 96  F (35.6  C) (Tympanic)   Resp 16   SpO2 97%   There is no height or weight on file to calculate BMI.  Physical Exam  Vitals and nursing note reviewed.   Constitutional:       Appearance: Normal appearance.   HENT:      Head: Normocephalic and atraumatic.      Comments: Significant right preauricular swelling, TTP. TTP over TMJ.  No malocclusion       Right Ear: Tympanic membrane and ear canal normal.      Left Ear: Tympanic membrane and ear canal normal.      Nose: Nose normal. No rhinorrhea.      Right Sinus: No maxillary sinus tenderness or frontal sinus tenderness.      Left Sinus: No maxillary sinus tenderness or frontal sinus tenderness.      Mouth/Throat:      Lips: Pink.      Mouth: Mucous membranes " are moist.      Pharynx: Oropharynx is clear. Posterior oropharyngeal erythema present.      Tonsils: 0 on the right. 0 on the left.      Comments: Sublingual area is tender, no erythema is noted. Michael's duct is not visible. No buccal cellulitis.  Eyes:      General: Lids are normal.      Conjunctiva/sclera: Conjunctivae normal.      Comments: Non icteric   Neck:      Comments: TTP of right lateral neck, submandibular area.  Cardiovascular:      Rate and Rhythm: Normal rate and regular rhythm.      Pulses: Normal pulses.      Heart sounds: Normal heart sounds, S1 normal and S2 normal.   Pulmonary:      Effort: Pulmonary effort is normal.      Breath sounds: Normal breath sounds and air entry.   Musculoskeletal:      Cervical back: Neck supple.   Lymphadenopathy:      Cervical: Cervical adenopathy (right ) present.   Skin:     General: Skin is warm and dry.      Comments: No notable rash to right flank   Neurological:      General: No focal deficit present.      Mental Status: He is alert and oriented to person, place, and time.   Psychiatric:         Mood and Affect: Mood normal.         Behavior: Behavior normal.         Thought Content: Thought content normal.         Judgment: Judgment normal.            Results for orders placed or performed in visit on 08/20/21 (from the past 24 hour(s))   CT Soft Tissue Neck w Contrast    Narrative    CT SCAN OF THE NECK WITH CONTRAST  8/20/2021 9:07 AM     HISTORY: Pain/swelling right mandible to right lateral neck, TTP  preauricular area, sublingual area, right lateral neck, submandibular  area; Neck pain on right side.    TECHNIQUE: Axial CT images of the neck following administration of  intravenous contrast with reformations. Radiation dose for this scan  was reduced using automated exposure control, adjustment of the mA  and/or kV according to patient size, or iterative reconstruction  technique. 80 mL Isovue 370 IV.     COMPARISON: None.     FINDINGS:   Visualized  paranasal sinuses, skull base and orbits: Atherosclerotic  calcifications involving the carotid siphons and bilateral  intracranial vertebral arteries. The visualized orbits appear normal.  Paranasal sinuses are free of significant disease. The mastoid and  middle ear cavities are clear.     Oral cavity, pharynx, laryngeal and visualized trachea: Portions of  the oral cavity are obscured by streak artifact from the patient's  dental amalgam. Visualized aspects of the oral cavity/tongue and floor  of mouth structures appear within normal limits. Small bilateral  calcified palatine tonsilliths are present. Otherwise unremarkable  appearance of the pharynx, larynx and visualized trachea.      and parapharyngeal spaces: Unremarkable.    Thyroid: Subcentimeter hypodense focus in the right thyroid lobe  without aggressive features, not requiring follow-up. Otherwise  unremarkable CT appearance of the thyroid.     Submandibular glands: Unremarkable.     Parotid glands: Diffuse amorphous increased soft tissue density and  mild asymmetric enlargement of the right parotid gland as compared to  the left. There appears to be a small complex fluid collection with  peripheral soft tissue density/enhancement in the superficial lobe of  the right parotid gland measuring approximately 10 mm (series 2 image  32, series 3 image 57). There is an ovoid soft tissue mass extending  from the superficial lobe of the right parotid gland medially into the  deep lobe (series 2 image 34, series 3 image 64) with mildly irregular  margins. This measures up to approximately 2.9 cm in the transverse  dimension and could represent an enlarged lymph node, but is  indeterminate. Other presumed mildly prominent/borderline enlarged  right parotid/periparotid lymph nodes are present. The left parotid  gland appears normal.    There appears to be some asymmetric soft tissue thickening of the  superficial capsule of the right parotid (series 2  image 35). Subtle  subcutaneous fat stranding suggested in the right lateral lower facial  region, which may represent cellulitis (series 3 image 38).    Lymph nodes: Multiple mildly enlarged asymmetric right  parotid/periparotid lymph nodes. Mildly prominent right level 2A lymph  node measuring up to 18-19 mm (series 2 image 50). No other definite  findings concerning for pathologic cervical lymphadenopathy.    Vasculature: Mild atherosclerosis involving the aortic arch. Mild  bilateral carotid bifurcation atherosclerotic disease without  high-grade stenosis. The cervical vertebral arteries appear grossly  patent.     Visualized upper mediastinum and lungs: Unremarkable.     Bones: Mild degenerative changes in the cervical spine.       Impression    IMPRESSION: Asymmetric amorphous hyperdensity and mild swelling of the  right parotid gland. There appears to be a small ovoid fluid  collection/cystic lesion in the superficial lobe of the right parotid  gland measuring 10 mm, concerning for intraparotid abscess or less  likely a centrally necrotic/cystic lymph node or mass. There is an  adjacent ovoid soft tissue mass measuring 2.9 cm which may represent  an enlarged lymph node extending from the superficial lobe into the  deep lobe of the right parotid gland. Multiple mildly enlarged  parotid/periparotid lymph nodes. Subtle asymmetric subcutaneous fat  stranding of the right lateral lower face. Overall, the findings are  favored to be inflammatory (parotiditis with small 10 mm intraparotid  abscess and reactive lymphadenopathy with overlying facial  cellulitis). The differential would include neoplasm/malignancy, and  follow-up CT neck is recommended.

## 2021-08-20 NOTE — PROGRESS NOTES
I reviewed the CT scan performed today.  My review of the CT scan shows some inflammatory changes to the right parotid gland.  There is a roughly 1 cm small fluid collection in the superficial parotid that data consistent with a small suppurative lymph node or possibly of dilated salivary duct.  There is no large abscess or significant surgically drainable fluid collection.  The patient is treatment naïve.  I would recommend a course of oral antibiotic and may be a short course of steroids to help with the inflammation.  We discussed sialagogues, warm compresses, good hydration, pain control.  If the patient is not improving, he needs to be seen by ENT next week, we could consider repeat imaging either CT or ultrasound if he is not improving.  My concern for malignancy would be low given the acute presentation and the imaging findings.  A possible secondarily infected Warthin's tumor would be within the differential, however the patient is a lifetime non-smoker.    He currently has an appointment to see me on September 3, 2021.  We could recheck his symptoms at that appointment.    Colt Alonso MD  Department of Otolarygology-Head and Neck Surgery  [facility]

## 2021-08-27 ENCOUNTER — TELEPHONE (OUTPATIENT)
Dept: OTOLARYNGOLOGY | Facility: CLINIC | Age: 76
End: 2021-08-27

## 2021-08-27 NOTE — TELEPHONE ENCOUNTER
"Patient is doing better. Swelling has improved.  Off prednisone. On Augmentin still  \"right lymph node by my jaw is still swollen\".   Otherwise c/o HA, relieved with ibuprofen and frequent need to clear throat along with dry mouth.     Has enough abx until Monday morning. He is wondering if you recommend any medications to be continued until he is seen on 9/2.     Thank you,   Alexandra BATES RN   [facility]   "

## 2021-08-27 NOTE — TELEPHONE ENCOUNTER
My recommendation would be just to take the full 10 days of the antibiotic.  Extending the antibiotic would not necessarily help and extending it longer would put him at risk for side effects of long-term antibiotic use.  If he has significant symptom worsening after stopping the antibiotic, he should let me know, otherwise we'll see him on Thursday.     IJL

## 2021-08-27 NOTE — TELEPHONE ENCOUNTER
Reason for Call:  Medication or medication refill:    Do you use a [facility]?  Name of the pharmacy and phone number for the current request:  [facility] [phone]    Name of the medication requested: aamoxicillin-clavulanate (AUGMENTIN) 875-125 MG tablet    Other request: Patient rescheduled appointment from 09/03/2021 to 09/02/2021. Pancho has 3 days (6 tablets) of the amoxicillin prescription left. Patient is worried the lymph nodes will become larger without the antibiotics and is wondering if the prescription should be extended until his appointment with Dr. Alonso on 09/02/2021.    Can we leave a detailed message on this number? YES    Phone number patient can be reached at: Home number on file [phone] (home)    Best Time: Anytime    Call taken on 8/27/2021 at 9:16 AM by Jac Arechiga

## 2021-08-31 NOTE — PROGRESS NOTES
Chief Complaint   Patient presents with     Throat Problem     Check throat- issues with cough which has gotten worse in the last several months-some headaches swelling on left side of face for about 2 weeks but did have some issues about 2 months     History of Present Illness  Pancho Najera Jr. is a 76 year old male who presents today for evaluation.  I am seeing this patient in consultation for suppurative parotitis at the request of the provider Jacinta Arnold CNP.  The patient had initially made this appointment for evaluation of chronic cough and sense of lump in his throat.  I spoke Jacinta Arnold and gutierrezide consultation with CT imaging results for this patient.  My review of the neck CT scan with contrast performed 8/20/2021 shows some inflammatory changes to the right parotid gland.  There is a roughly 1 cm small fluid collection in the superficial parotid that data consistent with a small suppurative lymph node or possibly of dilated salivary duct.  There is no large abscess or significant surgically drainable fluid collection.    Since the patient was treatment naïve at that time, I recommended a course of oral antibiotics and a short course of steroids to help with the inflammation.  I was to see him back in clinic for follow-up.      From a symptom standpoint, the patient reports dry throat, throat fullness, and cough.  He feels like his dry throat is worse when he uses his CPAP.  He was having trouble with the distilled water not discharging during the cycle for his CPAP.  His CPAP is about 5 years old.  He is not seen sleep medicine at [facility] recently.  He is unable to wear his CPAP due to the discomfort in the dry throat.    He denies any dysphagia, odynophagia, pharyngodynia, otalgia, hemoptysis.  He feels like his voice is a bit hoarse especially when his throat is dry.  He has noticed a lump in his right parotid gland and a reactive lymph node in the right upper neck.  He denies any  other neck lumps/bumps/swelling.  No unintentional weight loss.  He is a lifetime non-smoker but has had some secondhand smoke exposure.    From a cough and throat standpoint, the patient reports a dry nonproductive cough.  The patient denies any recent intubations or throat procedures. They note a remote history of relux. The patient does take 5 mg of lisinopril daily.     Of note, the patient has a history of asbestos related lung disease.  He had a history of some groundglass changes on his previous CT scans and a 7 mm left upper lobe nodule that has been stable but being followed with CT.  His last CT scan of the lungs was performed 11/2/2020.  I do not have the images for review, I did read the pulmonologist interpretation. The patient is a lifetime non-smoker.     Past Medical History  Patient Active Problem List   Diagnosis     Anxiety state     Chest pain     Depression     Morbid obesity (H)     Obstructive sleep apnea syndrome     History of respiratory system disease     Pleural plaque     Multiple pulmonary nodules     Ventricular premature beats     Ventricular bigeminy     Current Medications    Current Outpatient Medications:      Atorvastatin Calcium (LIPITOR PO), Take 10 mg by mouth daily , Disp: , Rfl:      blood glucose (FREESTYLE TEST STRIPS) test strip, 1 each by Other route, Disp: , Rfl:      blood glucose monitoring (SOFTCLIX) lancets, , Disp: , Rfl:      clonazePAM (KLONOPIN) 0.5 MG tablet, Take 0.5 mg by mouth 3 times daily as needed , Disp: , Rfl:      cycloSPORINE (RESTASIS) 0.05 % ophthalmic emulsion, , Disp: , Rfl:      GLIMEPIRIDE PO, Take 1 mg by mouth 2 times daily (before meals) , Disp: , Rfl:      ibuprofen (ADVIL/MOTRIN) 200 MG capsule, Take 200 mg by mouth every 4 hours as needed for fever, Disp: , Rfl:      Lisinopril (ZESTRIL PO), Take 5 mg by mouth daily , Disp: , Rfl:      losartan (COZAAR) 25 MG tablet, Take 1 tablet (25 mg) by mouth daily, Disp: 30 tablet, Rfl: 0      Acetaminophen (TYLENOL PO), Take 650 mg by mouth every 4 hours as needed  (Patient not taking: Reported on 8/20/2021), Disp: , Rfl:     Allergies  Allergies   Allergen Reactions     Pneumococcal Vaccine Other (See Comments)     swelling  swelling       Sulfa Drugs Hives       Social History  Social History     Socioeconomic History     Marital status:      Spouse name: Not on file     Number of children: Not on file     Years of education: Not on file     Highest education level: Not on file   Occupational History     Not on file   Tobacco Use     Smoking status: Never Smoker     Smokeless tobacco: Never Used   Substance and Sexual Activity     Alcohol use: Not on file     Drug use: Not on file     Sexual activity: Not on file   Other Topics Concern     Parent/sibling w/ CABG, MI or angioplasty before 65F 55M? Not Asked   Social History Narrative     Not on file     Social Determinants of Health     Financial Resource Strain:      Difficulty of Paying Living Expenses:    Food Insecurity:      Worried About Running Out of Food in the Last Year:      Ran Out of Food in the Last Year:    Transportation Needs:      Lack of Transportation (Medical):      Lack of Transportation (Non-Medical):    Physical Activity:      Days of Exercise per Week:      Minutes of Exercise per Session:    Stress:      Feeling of Stress :    Social Connections:      Frequency of Communication with Friends and Family:      Frequency of Social Gatherings with Friends and Family:      Attends Restorationism Services:      Active Member of Clubs or Organizations:      Attends Club or Organization Meetings:      Marital Status:    Intimate Partner Violence:      Fear of Current or Ex-Partner:      Emotionally Abused:      Physically Abused:      Sexually Abused:        Family History   History reviewed. No pertinent family history.    Review of Systems  As per HPI and PMHx, otherwise 10+ comprehensive system review is negative.    Physical  "Exam  /83 (BP Location: Right arm, Patient Position: Sitting, Cuff Size: Adult Regular)   Pulse 75   Temp 97.9  F (36.6  C) (Tympanic)   Ht 1.753 m (5' 9\")   Wt 96.2 kg (212 lb)   BMI 31.31 kg/m    GENERAL: Patient is a pleasant, cooperative 76 year old male in no acute distress.  HEAD: Normocephalic, atraumatic.  Hair and scalp are normal.  EYES: Pupils are equal, round, reactive to light and accommodation.  Extraocular movements are intact.  The sclera nonicteric without injection.  The extraocular structures are normal.  EARS: Normal shape and symmetry.  No tenderness when palpating the mastoid or tragal areas bilaterally.  Otoscopic exam reveals a minimal amount of cerumen bilaterally.  The bilateral tympanic membranes are round, intact without evidence of effusion, good landmarks.  No retraction, granulation, or drainage.  NOSE: Nares are patent.  Nasal mucosa is again moist.  Rightward anterior and leftward posterior nasal septal deviation.  No nasal cavity masses, polyps, or mucopurulence on anterior rhinoscopy.  ORAL CAVITY: Dentition is in reasonably good repair.  Mucous membranes are slightly dry.  Tongue is mobile, protrudes to the midline.  Palate elevates symmetrically.  Tonsils are 1.5+, symmetric.  No erythema or exudate.  No oral cavity or oropharyngeal masses, lesions, ulcerations, leukoplakia.  NECK: Supple, trachea is midline.  The right parotid gland there is a roughly 1 cm mass that is mobile, nonadherent overlying skin, no overlying skin change.  There is a palpable right level 2A lymph node that is mobile, roughly 1 cm.  There no additional palpable cervical lymphadenopathy or masses bilaterally.  Palpation of the left parotid and lateral submandibular areas reveal no masses.  No thyromegaly.    NEUROLOGIC: Cranial nerves II through XII are grossly intact.  Voice is strong.  Patient is House-Brackmann I/VI bilaterally.  CARDIOVASCULAR: Extremities are warm and well-perfused.  No " significant peripheral edema.  RESPIRATORY: Patient has nonlabored breathing without cough, wheeze, stridor.  PSYCHIATRIC: Patient is alert and oriented.  Mood and affect appear normal.  SKIN: Warm and dry.  No scalp, face, or neck lesions noted.    Procedure: Flexible Laryngoscopy   Indication: Chronic cough    To best visualize the upper airway anatomy and due to the chief complaint and HPI, I proceeded with flexible fiberoptic laryngoscopy examination.  The bilateral nasal cavities were anesthetized and decongested with a mixture of lidocaine and neosynephrine.  The bilateral nasal cavities were examined using a flexible fiberoptic laryngoscope.  There were no nasal cavity masses, polyps, or mucopurulence bilaterally.  The nasal septum deviates to the right anteriorly into the left more posteriorly.  The nasopharynx had a normal appearance with normal Eustachian tube openings and fossa of Rosenmuller bilaterally.  Minimal adenoid tissue.  The base of tongue, vallecula, epiglottis, aryepiglottic folds, arytenoids, and piriform sinuses were without mass or lesion.  The bilateral true vocal folds were symmetrically mobile without nodules or masses.  The visualized portions of the infraglottic and subglottic airway are unremarkable.  The scope was removed.  The patient tolerated the procedure well.                Assessment and Plan    ICD-10-CM    1. Chronic cough  R05 LARYNGOSCOPY FLEX FIBEROPTIC, DIAGNOSTIC     losartan (COZAAR) 25 MG tablet     SLEEP EVALUATION & MANAGEMENT REFERRAL - ADULT -   2. Mass of right parotid gland  K11.8 LARYNGOSCOPY FLEX FIBEROPTIC, DIAGNOSTIC     losartan (COZAAR) 25 MG tablet     SLEEP EVALUATION & MANAGEMENT REFERRAL - ADULT -      Biopsy Parotid Fine Needle Aspiration   3. Obstructive sleep apnea syndrome  G47.33 LARYNGOSCOPY FLEX FIBEROPTIC, DIAGNOSTIC     losartan (COZAAR) 25 MG tablet     SLEEP EVALUATION & MANAGEMENT REFERRAL - ADULT -      It was my pleasure seeing Pancho  ABEL Najera Jr. today in clinic.  The patient presents with chronic cough but no evidence of infection, inflammation, or neoplasm on exam to explain the symptoms. The differential diagnoses would definitely include intrinsic pulmonary disease, cough associated with sleep apnea, ACE inhibitor cough.  I think we should stop his lisinopril and start him on losartan.  I provided him with a prescription.  Usually it takes about 6 to 8 weeks for ACE inhibitor cough to recover if this is related.  He is also unable to wear his CPAP because the humidity chamber does not work appropriately.  I placed a referral to sleep medicine to have him establish with a sleep provider, hopefully they can help him with his CPAP issue.    He probably should have establish primary care.  I gave him the name of a few primary care providers that can see him.    He also has a mass in his right parotid gland.  This could be a neoplasm that was secondarily infected or potentially a reactive lymph node from his recent infection.  I do think ultrasound-guided needle biopsy is reasonable.  I will contact the patient with the results when available.    Regardless of the pathology results on his parotid, I would like to see him back in 6 weeks to recheck his cough.  He is to contact me if his blood pressure is okay on the 25 mg of losartan and I can extend his prescription past 30 days.    Colt Alonso MD  Department of Otolaryngology-Head and Neck Surgery  [facility]

## 2021-09-02 ENCOUNTER — OFFICE VISIT (OUTPATIENT)
Dept: OTOLARYNGOLOGY | Facility: CLINIC | Age: 76
End: 2021-09-02
Payer: COMMERCIAL

## 2021-09-02 VITALS
TEMPERATURE: 97.9 F | HEIGHT: 69 IN | BODY MASS INDEX: 31.4 KG/M2 | WEIGHT: 212 LBS | SYSTOLIC BLOOD PRESSURE: 116 MMHG | HEART RATE: 75 BPM | DIASTOLIC BLOOD PRESSURE: 83 MMHG

## 2021-09-02 DIAGNOSIS — R05.3 CHRONIC COUGH: Primary | ICD-10-CM

## 2021-09-02 DIAGNOSIS — G47.33 OBSTRUCTIVE SLEEP APNEA SYNDROME: ICD-10-CM

## 2021-09-02 DIAGNOSIS — K11.8 MASS OF RIGHT PAROTID GLAND: ICD-10-CM

## 2021-09-02 PROCEDURE — 31575 DIAGNOSTIC LARYNGOSCOPY: CPT | Performed by: OTOLARYNGOLOGY

## 2021-09-02 PROCEDURE — 99204 OFFICE O/P NEW MOD 45 MIN: CPT | Mod: 25 | Performed by: OTOLARYNGOLOGY

## 2021-09-02 RX ORDER — LANCETS
EACH MISCELLANEOUS
COMMUNITY
Start: 2019-09-24

## 2021-09-02 RX ORDER — LOSARTAN POTASSIUM 25 MG/1
25 TABLET ORAL DAILY
Qty: 30 TABLET | Refills: 0 | Status: SHIPPED | OUTPATIENT
Start: 2021-09-02 | End: 2021-09-14

## 2021-09-02 RX ORDER — BLOOD SUGAR DIAGNOSTIC
1 STRIP MISCELLANEOUS
COMMUNITY
Start: 2020-09-16 | End: 2022-09-26

## 2021-09-02 RX ORDER — CYCLOSPORINE 0.5 MG/ML
EMULSION OPHTHALMIC
COMMUNITY
Start: 2019-10-02 | End: 2024-03-14

## 2021-09-02 ASSESSMENT — MIFFLIN-ST. JEOR: SCORE: 1687.01

## 2021-09-02 NOTE — NURSING NOTE
"Initial /83 (BP Location: Right arm, Patient Position: Sitting, Cuff Size: Adult Regular)   Pulse 75   Temp 97.9  F (36.6  C) (Tympanic)   Ht 1.753 m (5' 9\")   Wt 96.2 kg (212 lb)   BMI 31.31 kg/m   Estimated body mass index is 31.31 kg/m  as calculated from the following:    Height as of this encounter: 1.753 m (5' 9\").    Weight as of this encounter: 96.2 kg (212 lb). .    Iraida Parham CMA    "

## 2021-09-02 NOTE — LETTER
9/2/2021         RE: Pancho Najera Jr.  [address]  [place] [address]        Dear Colleague,    Thank you for referring your patient, Pancho Najera Jr., to the [facility]. Please see a copy of my visit note below.    Chief Complaint   Patient presents with     Throat Problem     Check throat- issues with cough which has gotten worse in the last several months-some headaches swelling on left side of face for about 2 weeks but did have some issues about 2 months     History of Present Illness  Pancho Najera Jr. is a 76 year old male who presents today for evaluation.  I am seeing this patient in consultation for suppurative parotitis at the request of the provider Jacinta Arnold CNP.  The patient had initially made this appointment for evaluation of chronic cough and sense of lump in his throat.  I spoke Jacinta Arnold and gutierrezide consultation with CT imaging results for this patient.  My review of the neck CT scan with contrast performed 8/20/2021 shows some inflammatory changes to the right parotid gland.  There is a roughly 1 cm small fluid collection in the superficial parotid that data consistent with a small suppurative lymph node or possibly of dilated salivary duct.  There is no large abscess or significant surgically drainable fluid collection.    Since the patient was treatment naïve at that time, I recommended a course of oral antibiotics and a short course of steroids to help with the inflammation.  I was to see him back in clinic for follow-up.      From a symptom standpoint, the patient reports dry throat, throat fullness, and cough.  He feels like his dry throat is worse when he uses his CPAP.  He was having trouble with the distilled water not discharging during the cycle for his CPAP.  His CPAP is about 5 years old.  He is not seen sleep medicine at [place] recently.  He is unable to wear his CPAP due to the discomfort in the dry throat.    He denies  any dysphagia, odynophagia, pharyngodynia, otalgia, hemoptysis.  He feels like his voice is a bit hoarse especially when his throat is dry.  He has noticed a lump in his right parotid gland and a reactive lymph node in the right upper neck.  He denies any other neck lumps/bumps/swelling.  No unintentional weight loss.  He is a lifetime non-smoker but has had some secondhand smoke exposure.    From a cough and throat standpoint, the patient reports a dry nonproductive cough.  The patient denies any recent intubations or throat procedures. They note a remote history of relux. The patient does take 5 mg of lisinopril daily.     Of note, the patient has a history of asbestos related lung disease.  He had a history of some groundglass changes on his previous CT scans and a 7 mm left upper lobe nodule that has been stable but being followed with CT.  His last CT scan of the lungs was performed 11/2/2020.  I do not have the images for review, I did read the pulmonologist interpretation. The patient is a lifetime non-smoker.     Past Medical History  Patient Active Problem List   Diagnosis     Anxiety state     Chest pain     Depression     Morbid obesity (H)     Obstructive sleep apnea syndrome     History of respiratory system disease     Pleural plaque     Multiple pulmonary nodules     Ventricular premature beats     Ventricular bigeminy     Current Medications    Current Outpatient Medications:      Atorvastatin Calcium (LIPITOR PO), Take 10 mg by mouth daily , Disp: , Rfl:      blood glucose (FREESTYLE TEST STRIPS) test strip, 1 each by Other route, Disp: , Rfl:      blood glucose monitoring (SOFTCLIX) lancets, , Disp: , Rfl:      clonazePAM (KLONOPIN) 0.5 MG tablet, Take 0.5 mg by mouth 3 times daily as needed , Disp: , Rfl:      cycloSPORINE (RESTASIS) 0.05 % ophthalmic emulsion, , Disp: , Rfl:      GLIMEPIRIDE PO, Take 1 mg by mouth 2 times daily (before meals) , Disp: , Rfl:      ibuprofen (ADVIL/MOTRIN) 200 MG  capsule, Take 200 mg by mouth every 4 hours as needed for fever, Disp: , Rfl:      Lisinopril (ZESTRIL PO), Take 5 mg by mouth daily , Disp: , Rfl:      losartan (COZAAR) 25 MG tablet, Take 1 tablet (25 mg) by mouth daily, Disp: 30 tablet, Rfl: 0     Acetaminophen (TYLENOL PO), Take 650 mg by mouth every 4 hours as needed  (Patient not taking: Reported on 8/20/2021), Disp: , Rfl:     Allergies  Allergies   Allergen Reactions     Pneumococcal Vaccine Other (See Comments)     swelling  swelling       Sulfa Drugs Hives       Social History  Social History     Socioeconomic History     Marital status:      Spouse name: Not on file     Number of children: Not on file     Years of education: Not on file     Highest education level: Not on file   Occupational History     Not on file   Tobacco Use     Smoking status: Never Smoker     Smokeless tobacco: Never Used   Substance and Sexual Activity     Alcohol use: Not on file     Drug use: Not on file     Sexual activity: Not on file   Other Topics Concern     Parent/sibling w/ CABG, MI or angioplasty before 65F 55M? Not Asked   Social History Narrative     Not on file     Social Determinants of Health     Financial Resource Strain:      Difficulty of Paying Living Expenses:    Food Insecurity:      Worried About Running Out of Food in the Last Year:      Ran Out of Food in the Last Year:    Transportation Needs:      Lack of Transportation (Medical):      Lack of Transportation (Non-Medical):    Physical Activity:      Days of Exercise per Week:      Minutes of Exercise per Session:    Stress:      Feeling of Stress :    Social Connections:      Frequency of Communication with Friends and Family:      Frequency of Social Gatherings with Friends and Family:      Attends Islam Services:      Active Member of Clubs or Organizations:      Attends Club or Organization Meetings:      Marital Status:    Intimate Partner Violence:      Fear of Current or Ex-Partner:       "Emotionally Abused:      Physically Abused:      Sexually Abused:        Family History   History reviewed. No pertinent family history.    Review of Systems  As per HPI and PMHx, otherwise 10+ comprehensive system review is negative.    Physical Exam  /83 (BP Location: Right arm, Patient Position: Sitting, Cuff Size: Adult Regular)   Pulse 75   Temp 97.9  F (36.6  C) (Tympanic)   Ht 1.753 m (5' 9\")   Wt 96.2 kg (212 lb)   BMI 31.31 kg/m    GENERAL: Patient is a pleasant, cooperative 76 year old male in no acute distress.  HEAD: Normocephalic, atraumatic.  Hair and scalp are normal.  EYES: Pupils are equal, round, reactive to light and accommodation.  Extraocular movements are intact.  The sclera nonicteric without injection.  The extraocular structures are normal.  EARS: Normal shape and symmetry.  No tenderness when palpating the mastoid or tragal areas bilaterally.  Otoscopic exam reveals a minimal amount of cerumen bilaterally.  The bilateral tympanic membranes are round, intact without evidence of effusion, good landmarks.  No retraction, granulation, or drainage.  NOSE: Nares are patent.  Nasal mucosa is again moist.  Rightward anterior and leftward posterior nasal septal deviation.  No nasal cavity masses, polyps, or mucopurulence on anterior rhinoscopy.  ORAL CAVITY: Dentition is in reasonably good repair.  Mucous membranes are slightly dry.  Tongue is mobile, protrudes to the midline.  Palate elevates symmetrically.  Tonsils are 1.5+, symmetric.  No erythema or exudate.  No oral cavity or oropharyngeal masses, lesions, ulcerations, leukoplakia.  NECK: Supple, trachea is midline.  The right parotid gland there is a roughly 1 cm mass that is mobile, nonadherent overlying skin, no overlying skin change.  There is a palpable right level 2A lymph node that is mobile, roughly 1 cm.  There no additional palpable cervical lymphadenopathy or masses bilaterally.  Palpation of the left parotid and lateral " submandibular areas reveal no masses.  No thyromegaly.    NEUROLOGIC: Cranial nerves II through XII are grossly intact.  Voice is strong.  Patient is House-Brackmann I/VI bilaterally.  CARDIOVASCULAR: Extremities are warm and well-perfused.  No significant peripheral edema.  RESPIRATORY: Patient has nonlabored breathing without cough, wheeze, stridor.  PSYCHIATRIC: Patient is alert and oriented.  Mood and affect appear normal.  SKIN: Warm and dry.  No scalp, face, or neck lesions noted.    Procedure: Flexible Laryngoscopy   Indication: Chronic cough    To best visualize the upper airway anatomy and due to the chief complaint and HPI, I proceeded with flexible fiberoptic laryngoscopy examination.  The bilateral nasal cavities were anesthetized and decongested with a mixture of lidocaine and neosynephrine.  The bilateral nasal cavities were examined using a flexible fiberoptic laryngoscope.  There were no nasal cavity masses, polyps, or mucopurulence bilaterally.  The nasal septum deviates to the right anteriorly into the left more posteriorly.  The nasopharynx had a normal appearance with normal Eustachian tube openings and fossa of Rosenmuller bilaterally.  Minimal adenoid tissue.  The base of tongue, vallecula, epiglottis, aryepiglottic folds, arytenoids, and piriform sinuses were without mass or lesion.  The bilateral true vocal folds were symmetrically mobile without nodules or masses.  The visualized portions of the infraglottic and subglottic airway are unremarkable.  The scope was removed.  The patient tolerated the procedure well.                Assessment and Plan    ICD-10-CM    1. Chronic cough  R05 LARYNGOSCOPY FLEX FIBEROPTIC, DIAGNOSTIC     losartan (COZAAR) 25 MG tablet     SLEEP EVALUATION & MANAGEMENT REFERRAL - ADULT -   2. Mass of right parotid gland  K11.8 LARYNGOSCOPY FLEX FIBEROPTIC, DIAGNOSTIC     losartan (COZAAR) 25 MG tablet     SLEEP EVALUATION & MANAGEMENT REFERRAL - ADULT -     US Biopsy  Parotid Fine Needle Aspiration   3. Obstructive sleep apnea syndrome  G47.33 LARYNGOSCOPY FLEX FIBEROPTIC, DIAGNOSTIC     losartan (COZAAR) 25 MG tablet     SLEEP EVALUATION & MANAGEMENT REFERRAL - ADULT -      It was my pleasure seeing Pancho Najera Jr. today in clinic.  The patient presents with chronic cough but no evidence of infection, inflammation, or neoplasm on exam to explain the symptoms. The differential diagnoses would definitely include intrinsic pulmonary disease, cough associated with sleep apnea, ACE inhibitor cough.  I think we should stop his lisinopril and start him on losartan.  I provided him with a prescription.  Usually it takes about 6 to 8 weeks for ACE inhibitor cough to recover if this is related.  He is also unable to wear his CPAP because the humidity chamber does not work appropriately.  I placed a referral to sleep medicine to have him establish with a sleep provider, hopefully they can help him with his CPAP issue.    He probably should have establish primary care.  I gave him the name of a few primary care providers that can see him.    He also has a mass in his right parotid gland.  This could be a neoplasm that was secondarily infected or potentially a reactive lymph node from his recent infection.  I do think ultrasound-guided needle biopsy is reasonable.  I will contact the patient with the results when available.    Regardless of the pathology results on his parotid, I would like to see him back in 6 weeks to recheck his cough.  He is to contact me if his blood pressure is okay on the 25 mg of losartan and I can extend his prescription past 30 days.    Colt Alonso MD  Department of Otolaryngology-Head and Neck Surgery  [facility]         Again, thank you for allowing me to participate in the care of your patient.        Sincerely,        Colt Alonso MD

## 2021-09-02 NOTE — PATIENT INSTRUCTIONS
Per physician instructions      If you have questions or concerns on any instructions given to you by your provider today or if you need to schedule an appointment, you can reach us at [phone].     Biopsy of right parotid gland  See sleep to help with CPAP  Stop lisinopril, start losartan  Dr. Thompson or Michi

## 2021-09-07 DIAGNOSIS — Z11.59 ENCOUNTER FOR SCREENING FOR OTHER VIRAL DISEASES: ICD-10-CM

## 2021-09-11 ENCOUNTER — LAB (OUTPATIENT)
Dept: LAB | Facility: CLINIC | Age: 76
End: 2021-09-11
Payer: COMMERCIAL

## 2021-09-11 DIAGNOSIS — Z11.59 ENCOUNTER FOR SCREENING FOR OTHER VIRAL DISEASES: ICD-10-CM

## 2021-09-11 PROCEDURE — U0003 INFECTIOUS AGENT DETECTION BY NUCLEIC ACID (DNA OR RNA); SEVERE ACUTE RESPIRATORY SYNDROME CORONAVIRUS 2 (SARS-COV-2) (CORONAVIRUS DISEASE [COVID-19]), AMPLIFIED PROBE TECHNIQUE, MAKING USE OF HIGH THROUGHPUT TECHNOLOGIES AS DESCRIBED BY CMS-2020-01-R: HCPCS

## 2021-09-11 PROCEDURE — U0005 INFEC AGEN DETEC AMPLI PROBE: HCPCS

## 2021-09-12 LAB — SARS-COV-2 RNA RESP QL NAA+PROBE: NEGATIVE

## 2021-09-14 ENCOUNTER — OFFICE VISIT (OUTPATIENT)
Dept: FAMILY MEDICINE | Facility: CLINIC | Age: 76
End: 2021-09-14
Payer: COMMERCIAL

## 2021-09-14 ENCOUNTER — HOSPITAL ENCOUNTER (OUTPATIENT)
Dept: ULTRASOUND IMAGING | Facility: CLINIC | Age: 76
Discharge: HOME | End: 2021-09-14
Attending: OTOLARYNGOLOGY | Admitting: OTOLARYNGOLOGY
Payer: COMMERCIAL

## 2021-09-14 VITALS
RESPIRATION RATE: 16 BRPM | OXYGEN SATURATION: 96 % | TEMPERATURE: 97.3 F | DIASTOLIC BLOOD PRESSURE: 70 MMHG | BODY MASS INDEX: 31.4 KG/M2 | WEIGHT: 212 LBS | SYSTOLIC BLOOD PRESSURE: 110 MMHG | HEIGHT: 69 IN | HEART RATE: 80 BPM

## 2021-09-14 VITALS — SYSTOLIC BLOOD PRESSURE: 118 MMHG | DIASTOLIC BLOOD PRESSURE: 80 MMHG

## 2021-09-14 DIAGNOSIS — E11.42 TYPE 2 DIABETES MELLITUS WITH DIABETIC POLYNEUROPATHY, WITHOUT LONG-TERM CURRENT USE OF INSULIN (H): Primary | ICD-10-CM

## 2021-09-14 DIAGNOSIS — K11.8 PAROTID MASS: ICD-10-CM

## 2021-09-14 DIAGNOSIS — K11.8 MASS OF RIGHT PAROTID GLAND: ICD-10-CM

## 2021-09-14 DIAGNOSIS — F41.1 ANXIETY STATE: ICD-10-CM

## 2021-09-14 DIAGNOSIS — N18.31 STAGE 3A CHRONIC KIDNEY DISEASE (H): ICD-10-CM

## 2021-09-14 DIAGNOSIS — G89.29 CHRONIC LEFT-SIDED LOW BACK PAIN WITHOUT SCIATICA: ICD-10-CM

## 2021-09-14 DIAGNOSIS — R05.3 CHRONIC COUGH: ICD-10-CM

## 2021-09-14 DIAGNOSIS — G47.33 OBSTRUCTIVE SLEEP APNEA SYNDROME: ICD-10-CM

## 2021-09-14 DIAGNOSIS — M54.50 CHRONIC LEFT-SIDED LOW BACK PAIN WITHOUT SCIATICA: ICD-10-CM

## 2021-09-14 PROBLEM — E78.2 MIXED HYPERLIPIDEMIA: Status: ACTIVE | Noted: 2018-06-22

## 2021-09-14 PROBLEM — N18.30 CKD (CHRONIC KIDNEY DISEASE) STAGE 3, GFR 30-59 ML/MIN (H): Status: ACTIVE | Noted: 2020-09-20

## 2021-09-14 PROBLEM — J30.2 CHRONIC SEASONAL ALLERGIC RHINITIS: Status: ACTIVE | Noted: 2018-06-22

## 2021-09-14 PROBLEM — R26.89 BALANCE PROBLEMS: Status: ACTIVE | Noted: 2018-06-22

## 2021-09-14 PROBLEM — K21.00 GASTROESOPHAGEAL REFLUX DISEASE WITH ESOPHAGITIS: Status: ACTIVE | Noted: 2018-10-05

## 2021-09-14 PROBLEM — M72.2 PLANTAR FASCIITIS, BILATERAL: Status: ACTIVE | Noted: 2018-10-05

## 2021-09-14 PROCEDURE — 250N000009 HC RX 250: Performed by: RADIOLOGY

## 2021-09-14 PROCEDURE — 96127 BRIEF EMOTIONAL/BEHAV ASSMT: CPT | Performed by: FAMILY MEDICINE

## 2021-09-14 PROCEDURE — 99215 OFFICE O/P EST HI 40 MIN: CPT | Performed by: FAMILY MEDICINE

## 2021-09-14 PROCEDURE — 88173 CYTOPATH EVAL FNA REPORT: CPT | Mod: TC | Performed by: OTOLARYNGOLOGY

## 2021-09-14 PROCEDURE — 272N000431 US BIOPSY PAROTID FINE NEEDLE ASPIRATION

## 2021-09-14 RX ADMIN — LIDOCAINE HYDROCHLORIDE 2 ML: 10 INJECTION, SOLUTION EPIDURAL; INFILTRATION; INTRACAUDAL; PERINEURAL at 11:27

## 2021-09-14 ASSESSMENT — PATIENT HEALTH QUESTIONNAIRE - PHQ9
SUM OF ALL RESPONSES TO PHQ QUESTIONS 1-9: 13
SUM OF ALL RESPONSES TO PHQ QUESTIONS 1-9: 13
10. IF YOU CHECKED OFF ANY PROBLEMS, HOW DIFFICULT HAVE THESE PROBLEMS MADE IT FOR YOU TO DO YOUR WORK, TAKE CARE OF THINGS AT HOME, OR GET ALONG WITH OTHER PEOPLE: SOMEWHAT DIFFICULT

## 2021-09-14 ASSESSMENT — ANXIETY QUESTIONNAIRES
6. BECOMING EASILY ANNOYED OR IRRITABLE: SEVERAL DAYS
GAD7 TOTAL SCORE: 12
3. WORRYING TOO MUCH ABOUT DIFFERENT THINGS: NEARLY EVERY DAY
GAD7 TOTAL SCORE: 12
GAD7 TOTAL SCORE: 12
8. IF YOU CHECKED OFF ANY PROBLEMS, HOW DIFFICULT HAVE THESE MADE IT FOR YOU TO DO YOUR WORK, TAKE CARE OF THINGS AT HOME, OR GET ALONG WITH OTHER PEOPLE?: NOT DIFFICULT AT ALL
7. FEELING AFRAID AS IF SOMETHING AWFUL MIGHT HAPPEN: SEVERAL DAYS
7. FEELING AFRAID AS IF SOMETHING AWFUL MIGHT HAPPEN: SEVERAL DAYS
1. FEELING NERVOUS, ANXIOUS, OR ON EDGE: NOT AT ALL
4. TROUBLE RELAXING: SEVERAL DAYS
2. NOT BEING ABLE TO STOP OR CONTROL WORRYING: NEARLY EVERY DAY
5. BEING SO RESTLESS THAT IT IS HARD TO SIT STILL: NEARLY EVERY DAY

## 2021-09-14 ASSESSMENT — MIFFLIN-ST. JEOR: SCORE: 1687.01

## 2021-09-14 NOTE — DISCHARGE INSTRUCTIONS
Discharge Instructions for Fine-Needle Biopsy  You have had a procedure called fine-needle biopsy. This biopsy was done to learn more about a nodule or cyst in your gland or to find out what might be causing enlargement. During the biopsy, a very thin needle is inserted through the skin into the gland. The needle is used to remove a small amount of tissue from the gland. More than one tissue sample may be done to be sure to get cells from all parts of the nodule. Or the needle might be used to drain fluid from a cyst. Often a special ultrasound probe or transducer is used to help guide the needle to the right place. The tissue or fluid is then studied in a lab.   Home care  Here are some tips to take care of yourself at home:     You will have a small adhesive bandage on your biopsy site. Leave the bandage on for 4 to 6 hours. After this time, you don't need to keep it covered.     If you feel discomfort after the biopsy, take over-the-counter pain medicine. Don't take aspirin. It's normal to feel sore for a day or two.    Ask your healthcare provider when you can return to work and normal activities. This will likely be the same day as your procedure.  Getting your results  Your biopsy results may take a few days. When the results are ready, your healthcare provider will discuss them with you and tell you what, if anything, needs to be done next.   Follow-up  Make a follow-up appointment as directed by your healthcare provider.  When to call your healthcare provider  Call your healthcare provider right away if you have any of the following:    Bleeding that won t stop    Shortness of breath or trouble breathing    Fever of 100.4 F (38 C) or higher    Increasing pain, redness, tenderness, or drainage at the biopsy site    Swelling of the biopsy site  Be sure you understand what problems you should watch for and know how to reach the healthcare provider after hours and on weekends.   Cally last reviewed this  educational content on 6/1/2018 2000-2021 [employer]. All rights reserved. This information is not intended as a substitute for professional medical care. Always follow your healthcare professional's instructions.

## 2021-09-14 NOTE — PATIENT INSTRUCTIONS
Stop the Lisinopril.   Stop the Losartan as caused chest discomfort.     Monitor blood pressure with goal being less than 140/90.     Schedule appointment with Endocrinology at your convenience.     Follow up with sleep medicine regarding CPAP.     Will follow up after the parotid biopsy.

## 2021-09-14 NOTE — PROGRESS NOTES
Assessment & Plan     Type 2 diabetes mellitus with diabetic polyneuropathy, without long-term current use of insulin (H)  Last HbA1c of 6.7 5/14/2021  Cholesterol total cholesterol 136, LDL cholesterol 68, HDL cholesterol 51  On glimepiride 4 mg twice daily, Lipitor 10 mg daily.  Nontobacco user.  Wishes to see Endocrinology.     Stage 3a chronic kidney disease  Stable, continue to monitor.     Chronic left-sided low back pain without sciatica  Stable, no issues at this time tolerable with ibuprofen     Obstructive sleep apnea syndrome  Plans to see sleep medicine for re-evaluation.     Anxiety state  Stable, follows with Psychiatry on Klonopin 3 times daily.     Chronic cough  HTN  Recently stopped Lisinopril medication as potential culprit. Did not tolerate losartan. BP well controlled. Last microalbumin without significant protein. Will have patient monitor BP at home. If persistently elevated greater than 140/90 will start patient on amlodipine 5 mg daily.   Chronic cough likely related to dry mouth/ not using CPAP. Advised to follow up with sleep medicine. If not improved after this could consider pilocarpine or a trial of acid reduction with PPI to see if this is a potential culprit.     Parotid mass  Under FNA by ENT earlier today. Next steps pending these results. Follows with ENT.    Follow up with annual visit in upcoming weeks.       The risks, benefits and treatment options of prescribed medications or other treatments have been discussed with the patient. The patient verbalized their understanding and should call or follow up if no improvement or if they develop further problems.    >40 minutes spent on the date of the encounter doing chart review, history and exam, documentation and further activities as noted above      Jorge Alberto Thompson DO  [facility]    Roberto Deleon is a 76 year old who presents for the following health issues     HPI     Presents today to establish  care.     Diabetes  Last HbA1c of 6.7 5/14/2021  Cholesterol total cholesterol 136, LDL cholesterol 68, HDL cholesterol 51  Recently received steroids due to parotid mass  Took steroids for 4 days. Blood sugars in the 170 range.   Currently on Glimepiride 4 mg twice daily.   Checks sugars twice daily. Reports being tired of sticking himself and checking his blood sugars.   Wishes to see Endocrinology. Discussed with patient I felt comfortable managing his diabetes as only on glimepiride and well controlled but still wishes to see Endocrinology.     Will abstract recent labs from [employer].     Dry mouth/ cough   Parotid mass-s/p FNA  Recently has been evaluated by ENT due to chronic cough and also mass of parotid gland.  Patient's lisinopril stopped and was started on losartan. He did not tolerate the losartan medication. Underwent a fine-needle biopsy of the parotid earlier today through ENT. Recently started on Amoxicillin and also prednisone prior to this which helped with the inflammation in the neck but resulted in brief increase in daily sugars as expected.     KACY  CPAP is not currently working. Has not seen sleep medicine for a while. Reports it dried him out quite a bit as his humidifier wasn't working. This resulted in a dry mouth.   Has not used a CPAP machine for the past year.   Has follow up with sleep medicine in the near future.     Anxiety   Sees Psychiatry Dr. Floyd.   Uses Klonopin 0.5 mg three times daily.     Dry mouth:   Has tried biotene dry mouth in the past.   Hasn't used sialologue's in the past. Could consider in the future but would like to address parotid mass and sleep issues first.        Back pain   Will use ibuprofen if symptoms are real bad.   Uses aspercreme on the back with mild improvement of symptoms.   Stable.       Review of Systems   Constitutional, HEENT, cardiovascular, pulmonary, gi and gu systems are negative, except as otherwise noted.      Objective    /70  "(BP Location: Left arm, Patient Position: Chair, Cuff Size: Adult Large)   Pulse 80   Temp 97.3  F (36.3  C) (Tympanic)   Resp 16   Ht 1.753 m (5' 9\")   Wt 96.2 kg (212 lb)   SpO2 96%   BMI 31.31 kg/m    Body mass index is 31.31 kg/m .  Physical Exam   General: alert, cooperative, no acute distress   HEENT: NC/AT. Oropharynx clear. Right parotid biopsy site clean, with no surrounding erythema.   CV: RRR, no murmur  Resp: non-labored breathing, clear to auscultation, no wheezing or rales       "

## 2021-09-15 PROBLEM — N18.31 STAGE 3A CHRONIC KIDNEY DISEASE (H): Status: ACTIVE | Noted: 2020-09-20

## 2021-09-15 ASSESSMENT — ANXIETY QUESTIONNAIRES: GAD7 TOTAL SCORE: 12

## 2021-09-16 ENCOUNTER — TELEPHONE (OUTPATIENT)
Dept: OTOLARYNGOLOGY | Facility: CLINIC | Age: 76
End: 2021-09-16

## 2021-09-16 LAB
PATH REPORT.COMMENTS IMP SPEC: ABNORMAL
PATH REPORT.COMMENTS IMP SPEC: ABNORMAL
PATH REPORT.COMMENTS IMP SPEC: YES
PATH REPORT.FINAL DX SPEC: ABNORMAL
PATH REPORT.GROSS SPEC: ABNORMAL
PATH REPORT.MICROSCOPIC SPEC OTHER STN: ABNORMAL
PATH REPORT.RELEVANT HX SPEC: ABNORMAL

## 2021-09-16 PROCEDURE — 88173 CYTOPATH EVAL FNA REPORT: CPT | Mod: 26 | Performed by: PATHOLOGY

## 2021-09-16 NOTE — TELEPHONE ENCOUNTER
I left a message with the patient's spouse.  He will contact me tomorrow to discuss his pathology results.    Colt Alonso MD  Department of Otolaryngology-Head and Neck Surgery  [facility]

## 2021-09-17 NOTE — TELEPHONE ENCOUNTER
Spoke to the patient regarding the results of his right parotid fine-needle aspiration biopsy. The final biopsy was atypical cells of undetermined significance. There is no obvious malignancy or confirmed neoplasm. In these instances, I usually wait a period of time, likely 4 to 6 weeks prior to considering repeat biopsy. In his specific case, I might consider MRI imaging to better delineate any lesions in the parotid. Given the likelihood that this was infectious, there is a good chance that if we repeat his imaging, we will no longer see an obvious lesion which might negate the need for biopsy. He has an appointment to see me in October. I would like to repeat his physical exam at that time. We can discuss imaging and/or moving forward with repeat fine-needle aspiration biopsy at that time.    Colt Alonso MD  Department of Otolaryngology-Head and Neck Surgery  [facility]

## 2021-10-05 ENCOUNTER — TELEPHONE (OUTPATIENT)
Dept: FAMILY MEDICINE | Facility: CLINIC | Age: 76
End: 2021-10-05

## 2021-10-05 NOTE — LETTER
[facility]  [address]  [facility] [address]  [phone]  October 5, 2021    Pancho Najera Jr.  [address]  [facility] [address]    Dear Pancho,    We care about your health and have reviewed your health plan including your medical conditions, medication list, and lab results.  Based on this review, it is recommended that you follow up regarding the following health topic(s):     -Colon Cancer Screening    We recommend you take the following action(s):  -schedule a COLONOSCOPY to look for colon cancer (due every 10 years or 5 years in higher risk situations.)  Colonoscopies can prevent 90-95% of colon cancer deaths.  Problem lesions can be removed before they ever become cancer.  If you do not wish to do a colonoscopy or cannot afford to do one at this time, there is another option called a Fecal Immunochemical Occult Blood Test (FIT) a take home stool sample kit.  It does not replace the colonoscopy for colorectal cancer screening, but it can detect hidden bleeding in the lower colon.  It does need to be repeated every year and if a positive result is obtained, you would be referred for a colonoscopy.  If you have completed either one of these tests at another facility, please have the records sent to our clinic for our records.    Please call us at [phone] (or use [employer]) to address the above recommendations.     Thank you for trusting [facility] and we appreciate the opportunity to serve you.  We look forward to supporting your healthcare needs in the future.    Healthy Regards,      Your Health Care Team  [facility]

## 2021-10-05 NOTE — TELEPHONE ENCOUNTER
Patient Quality Outreach      Summary:    Patient has the following on his problem list/HM:     Depression / Dysthymia review    6 Month Remission: 4-8 month window range: na  12 Month Remission: 10-14 month window range: na       PHQ-9 SCORE 9/14/2021   PHQ-9 Total Score MyChart 13 (Moderate depression)   PHQ-9 Total Score 13       If PHQ-9 recheck is 5 or more, route to provider for next steps.    Diabetes    Last A1C:  No results found for: A1C    Last LDL:    No results found for: LDL    Is the patient on a Statin? Yes          Is the patient on Aspirin? No    Medications     HMG CoA Reductase Inhibitors     Atorvastatin Calcium (LIPITOR PO)             Last three blood pressure readings:  BP Readings from Last 3 Encounters:   09/14/21 118/80   09/14/21 110/70   09/02/21 116/83            Tobacco Use      Smoking status: Never Smoker      Smokeless tobacco: Never Used          Patient is due/failing the following:   Colonoscopy and Annual wellness, date due: now    Type of outreach:    Sent letter.    Questions for provider review:    None                                                                                                                                     Alejandra Machado MA

## 2021-10-14 ENCOUNTER — OFFICE VISIT (OUTPATIENT)
Dept: OTOLARYNGOLOGY | Facility: CLINIC | Age: 76
End: 2021-10-14
Payer: COMMERCIAL

## 2021-10-14 VITALS
SYSTOLIC BLOOD PRESSURE: 122 MMHG | HEART RATE: 68 BPM | TEMPERATURE: 96.9 F | DIASTOLIC BLOOD PRESSURE: 75 MMHG | WEIGHT: 212 LBS | BODY MASS INDEX: 31.4 KG/M2 | HEIGHT: 69 IN

## 2021-10-14 DIAGNOSIS — G47.33 OBSTRUCTIVE SLEEP APNEA SYNDROME: Primary | ICD-10-CM

## 2021-10-14 DIAGNOSIS — Z78.9 INTOLERANCE OF CONTINUOUS POSITIVE AIRWAY PRESSURE (CPAP) VENTILATION: ICD-10-CM

## 2021-10-14 DIAGNOSIS — K11.7 XEROSTOMIA: ICD-10-CM

## 2021-10-14 DIAGNOSIS — Z87.19 HISTORY OF PAROTITIS: ICD-10-CM

## 2021-10-14 PROCEDURE — 99213 OFFICE O/P EST LOW 20 MIN: CPT | Performed by: OTOLARYNGOLOGY

## 2021-10-14 RX ORDER — LISINOPRIL 5 MG/1
5 TABLET ORAL DAILY
COMMUNITY
End: 2022-06-22

## 2021-10-14 ASSESSMENT — MIFFLIN-ST. JEOR: SCORE: 1687.01

## 2021-10-14 NOTE — PATIENT INSTRUCTIONS
Per physician instructions      If you have questions or concerns on any instructions given to you by your provider today or if you need to schedule an appointment, you can reach us at [phone].

## 2021-10-14 NOTE — NURSING NOTE
"Initial /75 (BP Location: Left arm, Patient Position: Sitting, Cuff Size: Adult Large)   Pulse 68   Temp 96.9  F (36.1  C) (Tympanic)   Ht 1.753 m (5' 9\")   Wt 96.2 kg (212 lb)   BMI 31.31 kg/m   Estimated body mass index is 31.31 kg/m  as calculated from the following:    Height as of this encounter: 1.753 m (5' 9\").    Weight as of this encounter: 96.2 kg (212 lb). .    Iraida Parham CMA    "

## 2021-10-14 NOTE — LETTER
10/14/2021         RE: Pancho Najera Jr.  [address]  [facility] [address]        Dear Colleague,    Thank you for referring your patient, Pancho Najera Jr., to the [facility]. Please see a copy of my visit note below.    Chief Complaint   Patient presents with     Ent Problem     Follow up test results      History of Present Illness  Pancho Najera Jr. is a 76 year old male who presents today for follow-up.  I evaluated the patient on 9/2/2021 with multiple concerns.  The patient had a right supraglottic lesion that was in previous imaging.  The patient underwent an ultrasound-guided needle biopsy on 9/14/2021. The final biopsy was atypical cells of undetermined significance. There is no obvious malignancy or confirmed neoplasm on the biopsy.  We discussed close observation with consideration of repeat biopsy versus potential repeat imaging including MRI for follow-up.    The patient was also struggling with dry throat.  His CPAP machine did not appear to be discharging there is distilled water placed for humidity.  Due to the dry mouth, dry throat, throat discomfort, he was not tolerating his CPAP.  He was also having issues with chronic dry nonproductive cough.  He was on 5 mg lisinopril daily and did have a remote history of gastroesophageal reflux.  We stopped his lisinopril and started him on losartan.  He did not tolerate losartan so this was discontinued.  He has been watching his blood pressure closely.    Of note, the patient has a history of asbestos related lung disease.  He had a history of some groundglass changes on his previous CT scans and a 7 mm left upper lobe nodule that has been stable but being followed with CT.  His last CT scan of the lungs was performed 11/2/2020.  I do not have the images for review, I did read the pulmonologist interpretation. The patient is a lifetime non-smoker.     Since last seeing the patient, the patient reports that his  right parotid gland swelling has resolved.  He denies any dysphagia, odynophagia, pharyngodynia, otalgia, hemoptysis.  He feels like his voice is a bit hoarse especially when his throat is dry.  He is a mouth breather at nighttime. He denies any other neck lumps/bumps/swelling.  No unintentional weight loss.  He is a lifetime non-smoker but has had some secondhand smoke exposure.    Past Medical History  Patient Active Problem List   Diagnosis     Anxiety state     Chest pain     Depression     Obstructive sleep apnea syndrome     History of respiratory system disease     Pleural plaque     Multiple pulmonary nodules     Ventricular premature beats     Ventricular bigeminy     Chronic left-sided low back pain without sciatica     Chronic seasonal allergic rhinitis     Stage 3a chronic kidney disease (H)     Mixed hyperlipidemia     Type 2 diabetes mellitus with diabetic polyneuropathy, without long-term current use of insulin (H)     Balance problems     Gastroesophageal reflux disease with esophagitis     Plantar fasciitis, bilateral     Current Medications    Current Outpatient Medications:      Acetaminophen (TYLENOL PO), Take 650 mg by mouth every 4 hours as needed , Disp: , Rfl:      Atorvastatin Calcium (LIPITOR PO), Take 10 mg by mouth daily , Disp: , Rfl:      blood glucose (FREESTYLE TEST STRIPS) test strip, 1 each by Other route, Disp: , Rfl:      blood glucose monitoring (SOFTCLIX) lancets, , Disp: , Rfl:      clonazePAM (KLONOPIN) 0.5 MG tablet, Take 0.5 mg by mouth 3 times daily as needed , Disp: , Rfl:      cycloSPORINE (RESTASIS) 0.05 % ophthalmic emulsion, , Disp: , Rfl:      GLIMEPIRIDE PO, Take 4 mg by mouth 2 times daily (before meals), Disp: , Rfl:      ibuprofen (ADVIL/MOTRIN) 200 MG capsule, Take 200 mg by mouth every 4 hours as needed for fever, Disp: , Rfl:      lisinopril (ZESTRIL) 5 MG tablet, Take 5 mg by mouth daily, Disp: , Rfl:     Allergies  Allergies   Allergen Reactions      Pneumococcal Vaccine Other (See Comments)     swelling  swelling       Sulfa Drugs Hives       Social History  Social History     Socioeconomic History     Marital status:      Spouse name: Not on file     Number of children: Not on file     Years of education: Not on file     Highest education level: Not on file   Occupational History     Not on file   Tobacco Use     Smoking status: Never Smoker     Smokeless tobacco: Never Used   Vaping Use     Vaping Use: Never used   Substance and Sexual Activity     Alcohol use: Not Currently     Drug use: Never     Sexual activity: Not Currently   Other Topics Concern     Parent/sibling w/ CABG, MI or angioplasty before 65F 55M? Not Asked   Social History Narrative     Not on file     Social Determinants of Health     Financial Resource Strain:      Difficulty of Paying Living Expenses:    Food Insecurity:      Worried About Running Out of Food in the Last Year:      Ran Out of Food in the Last Year:    Transportation Needs:      Lack of Transportation (Medical):      Lack of Transportation (Non-Medical):    Physical Activity:      Days of Exercise per Week:      Minutes of Exercise per Session:    Stress:      Feeling of Stress :    Social Connections:      Frequency of Communication with Friends and Family:      Frequency of Social Gatherings with Friends and Family:      Attends Yarsanism Services:      Active Member of Clubs or Organizations:      Attends Club or Organization Meetings:      Marital Status:    Intimate Partner Violence:      Fear of Current or Ex-Partner:      Emotionally Abused:      Physically Abused:      Sexually Abused:        Family History  Family History   Problem Relation Age of Onset     Diabetes Mother      Coronary Artery Disease Mother      Uterine Cancer Mother      Lung Cancer Father      Diabetes Father        Review of Systems  As per HPI and PMHx, otherwise 10 system review including the head and neck, constitutional, eyes,  "respiratory, GI, skin, neurologic, lymphatic, endocrine, and allergy systems is negative.    Physical Exam  /75 (BP Location: Left arm, Patient Position: Sitting, Cuff Size: Adult Large)   Pulse 68   Temp 96.9  F (36.1  C) (Tympanic)   Ht 1.753 m (5' 9\")   Wt 96.2 kg (212 lb)   BMI 31.31 kg/m    GENERAL: Patient is a pleasant, cooperative 76 year old male in no acute distress.  HEAD: Normocephalic, atraumatic.  Hair and scalp are normal.  EYES: Pupils are equal, round, reactive to light and accommodation.  Extraocular movements are intact.  The sclera nonicteric without injection.  The extraocular structures are normal.  EARS: Normal shape and symmetry.  No tenderness when palpating the mastoid or tragal areas bilaterally.    NOSE: Nares are patent.  Nasal mucosa is again moist.  Rightward anterior and leftward posterior nasal septal deviation.  No nasal cavity masses, polyps, or mucopurulence on anterior rhinoscopy.  ORAL CAVITY: Dentition is in reasonably good repair.  Mucous membranes are slightly dry.  Tongue is mobile, protrudes to the midline.  Palate elevates symmetrically.  Tonsils are 1.5+, symmetric.  No erythema or exudate.  No oral cavity or oropharyngeal masses, lesions, ulcerations, leukoplakia.  NECK: Supple, trachea is midline.    Patient of the right parotid gland no longer reveals a mass or lesion.  There no additional palpable cervical lymphadenopathy or masses bilaterally.  Palpation of the left parotid and bilateral submandibular areas reveal no masses.  No thyromegaly.    NEUROLOGIC: Cranial nerves II through XII are grossly intact.  Voice is strong.  Patient is House-Brackmann I/VI bilaterally.  CARDIOVASCULAR: Extremities are warm and well-perfused.  No significant peripheral edema.  RESPIRATORY: Patient has nonlabored breathing without cough, wheeze, stridor.  PSYCHIATRIC: Patient is alert and oriented.  Mood and affect appear normal.  SKIN: Warm and dry.  No scalp, face, or neck " lesions noted.    Assessment and Plan     ICD-10-CM    1. Obstructive sleep apnea syndrome  G47.33 SLEEP EVALUATION & MANAGEMENT REFERRAL - ADULT -   2. Intolerance of continuous positive airway pressure (CPAP) ventilation  Z78.9 SLEEP EVALUATION & MANAGEMENT REFERRAL - ADULT -   3. History of parotitis  Z87.19    4. Xerostomia  K11.7       It was my pleasure seeing Pancho Najera Jr. today in clinic.  Patient appears to have had resolution of his parotid gland lesion.  I think that he likely had a parotitis with an inflamed intragland lymph node that has subsequently resolved.  This would also be consistent with his possible atypical or inflammatory cells seen on his needle biopsy.  Given that his clinical exam is normalized, I would not recommend any further work-up, imaging, or biopsy.    The patient continues to struggle with daily headache, dry mouth due to mouth breathing at night with sleeping/snoring, and intolerance to his CPAP machine.  He feels his CPAP machine is working improperly.  He has not been seen at [place] for sleep.  I will place a referral for sleep medicine through [facility].  I encouraged him to make an appointment for consultation and evaluation.  And he wanted try a different CPAP, different masks, etc.    Colt Alonso MD  Department of Otolaryngology-Head and Neck Surgery  [facility]           Again, thank you for allowing me to participate in the care of your patient.        Sincerely,        Colt Alonso MD

## 2021-12-28 ENCOUNTER — TELEPHONE (OUTPATIENT)
Dept: FAMILY MEDICINE | Facility: CLINIC | Age: 76
End: 2021-12-28
Payer: COMMERCIAL

## 2021-12-28 NOTE — TELEPHONE ENCOUNTER
Patient Quality Outreach    Patient is due for the following:   Colon Cancer Screening -  Colonoscopy  Physical  - never had one.    NEXT STEPS:   Schedule a yearly physical    Type of outreach:    Sent letter.      Questions for provider review:    None     Alejandra Machado, [facility]

## 2021-12-28 NOTE — LETTER
[facility]  [address]  [facility] [address]  [phone]  December 28, 2021    Pancho Najera Jr.   [address]  [facility] [address]    Dear Pancho,    We care about your health and have reviewed your health plan including your medical conditions, medication list, and lab results.  Based on this review, it is recommended that you follow up regarding the following health topic(s):     -Colon Cancer Screening  -Wellness (Physical) Visit     We recommend you take the following action(s):  -schedule a MAMMOGRAM which is due. Please disregard this reminder if you have had this exam elsewhere within the last 1-2 years please let us know so we can update your records.  -schedule a COLONOSCOPY to look for colon cancer (due every 10 years or 5 years in higher risk situations.)  Colonoscopies can prevent 90-95% of colon cancer deaths.  Problem lesions can be removed before they ever become cancer.  If you do not wish to do a colonoscopy or cannot afford to do one at this time, there is another option called a Fecal Immunochemical Occult Blood Test (FIT) a take home stool sample kit.  It does not replace the colonoscopy for colorectal cancer screening, but it can detect hidden bleeding in the lower colon.  It does need to be repeated every year and if a positive result is obtained, you would be referred for a colonoscopy.  If you have completed either one of these tests at another facility, please have the records sent to our clinic for our records.    Please call us at [phone] (or use [employer]) to address the above recommendations.     Thank you for trusting [facility] and we appreciate the opportunity to serve you.  We look forward to supporting your healthcare needs in the future.    Healthy Regards,      Your Health Care Team  [facility]

## 2022-05-01 ENCOUNTER — TRANSFERRED RECORDS (OUTPATIENT)
Dept: MULTI SPECIALTY CLINIC | Facility: CLINIC | Age: 77
End: 2022-05-01

## 2022-05-01 LAB — RETINOPATHY: NORMAL

## 2022-05-24 ENCOUNTER — APPOINTMENT (OUTPATIENT)
Dept: CT IMAGING | Facility: CLINIC | Age: 77
End: 2022-05-24
Attending: FAMILY MEDICINE
Payer: COMMERCIAL

## 2022-05-24 ENCOUNTER — HOSPITAL ENCOUNTER (EMERGENCY)
Facility: CLINIC | Age: 77
Discharge: HOME | End: 2022-05-24
Attending: FAMILY MEDICINE | Admitting: FAMILY MEDICINE
Payer: COMMERCIAL

## 2022-05-24 ENCOUNTER — NURSE TRIAGE (OUTPATIENT)
Dept: FAMILY MEDICINE | Facility: CLINIC | Age: 77
End: 2022-05-24
Payer: COMMERCIAL

## 2022-05-24 VITALS
RESPIRATION RATE: 9 BRPM | DIASTOLIC BLOOD PRESSURE: 80 MMHG | HEART RATE: 77 BPM | SYSTOLIC BLOOD PRESSURE: 133 MMHG | BODY MASS INDEX: 32.19 KG/M2 | OXYGEN SATURATION: 97 % | TEMPERATURE: 98.2 F | WEIGHT: 218 LBS

## 2022-05-24 DIAGNOSIS — R07.9 CHEST PAIN, UNSPECIFIED TYPE: ICD-10-CM

## 2022-05-24 DIAGNOSIS — K11.8 MASS OF RIGHT PAROTID GLAND: ICD-10-CM

## 2022-05-24 DIAGNOSIS — R06.00 DYSPNEA, UNSPECIFIED TYPE: ICD-10-CM

## 2022-05-24 DIAGNOSIS — R42 VERTIGO: ICD-10-CM

## 2022-05-24 DIAGNOSIS — R42 LIGHTHEADEDNESS: ICD-10-CM

## 2022-05-24 LAB
ALBUMIN SERPL-MCNC: 3.5 G/DL (ref 3.4–5)
ALP SERPL-CCNC: 78 U/L (ref 40–150)
ALT SERPL W P-5'-P-CCNC: 31 U/L (ref 0–70)
ANION GAP SERPL CALCULATED.3IONS-SCNC: 7 MMOL/L (ref 3–14)
AST SERPL W P-5'-P-CCNC: 19 U/L (ref 0–45)
BASOPHILS # BLD AUTO: 0 10E3/UL (ref 0–0.2)
BASOPHILS NFR BLD AUTO: 1 %
BILIRUB SERPL-MCNC: 0.5 MG/DL (ref 0.2–1.3)
BUN SERPL-MCNC: 19 MG/DL (ref 7–30)
CALCIUM SERPL-MCNC: 9.5 MG/DL (ref 8.5–10.1)
CHLORIDE BLD-SCNC: 105 MMOL/L (ref 94–109)
CO2 SERPL-SCNC: 26 MMOL/L (ref 20–32)
CREAT SERPL-MCNC: 1.05 MG/DL (ref 0.66–1.25)
CRP SERPL-MCNC: <2.9 MG/L (ref 0–8)
EOSINOPHIL # BLD AUTO: 0.5 10E3/UL (ref 0–0.7)
EOSINOPHIL NFR BLD AUTO: 9 %
ERYTHROCYTE [DISTWIDTH] IN BLOOD BY AUTOMATED COUNT: 12.2 % (ref 10–15)
FLUAV RNA SPEC QL NAA+PROBE: NEGATIVE
FLUBV RNA RESP QL NAA+PROBE: NEGATIVE
GFR SERPL CREATININE-BSD FRML MDRD: 74 ML/MIN/1.73M2
GLUCOSE BLD-MCNC: 274 MG/DL (ref 70–99)
HCT VFR BLD AUTO: 38.8 % (ref 40–53)
HGB BLD-MCNC: 13.3 G/DL (ref 13.3–17.7)
IMM GRANULOCYTES # BLD: 0 10E3/UL
IMM GRANULOCYTES NFR BLD: 0 %
INR PPP: 1.08 (ref 0.85–1.15)
LACTATE SERPL-SCNC: 1.2 MMOL/L (ref 0.7–2)
LIPASE SERPL-CCNC: 59 U/L (ref 73–393)
LYMPHOCYTES # BLD AUTO: 1.7 10E3/UL (ref 0.8–5.3)
LYMPHOCYTES NFR BLD AUTO: 30 %
MCH RBC QN AUTO: 31 PG (ref 26.5–33)
MCHC RBC AUTO-ENTMCNC: 34.3 G/DL (ref 31.5–36.5)
MCV RBC AUTO: 90 FL (ref 78–100)
MONOCYTES # BLD AUTO: 0.6 10E3/UL (ref 0–1.3)
MONOCYTES NFR BLD AUTO: 10 %
NEUTROPHILS # BLD AUTO: 2.9 10E3/UL (ref 1.6–8.3)
NEUTROPHILS NFR BLD AUTO: 50 %
NRBC # BLD AUTO: 0 10E3/UL
NRBC BLD AUTO-RTO: 0 /100
PLATELET # BLD AUTO: 185 10E3/UL (ref 150–450)
POTASSIUM BLD-SCNC: 4.4 MMOL/L (ref 3.4–5.3)
PROT SERPL-MCNC: 7.3 G/DL (ref 6.8–8.8)
RBC # BLD AUTO: 4.29 10E6/UL (ref 4.4–5.9)
SARS-COV-2 RNA RESP QL NAA+PROBE: NEGATIVE
SODIUM SERPL-SCNC: 138 MMOL/L (ref 133–144)
TROPONIN I SERPL HS-MCNC: 5 NG/L
WBC # BLD AUTO: 5.9 10E3/UL (ref 4–11)

## 2022-05-24 PROCEDURE — 258N000003 HC RX IP 258 OP 636: Performed by: FAMILY MEDICINE

## 2022-05-24 PROCEDURE — 71275 CT ANGIOGRAPHY CHEST: CPT

## 2022-05-24 PROCEDURE — 96361 HYDRATE IV INFUSION ADD-ON: CPT

## 2022-05-24 PROCEDURE — 84484 ASSAY OF TROPONIN QUANT: CPT | Performed by: FAMILY MEDICINE

## 2022-05-24 PROCEDURE — 250N000011 HC RX IP 250 OP 636: Performed by: FAMILY MEDICINE

## 2022-05-24 PROCEDURE — 86140 C-REACTIVE PROTEIN: CPT | Performed by: FAMILY MEDICINE

## 2022-05-24 PROCEDURE — 85025 COMPLETE CBC W/AUTO DIFF WBC: CPT | Performed by: FAMILY MEDICINE

## 2022-05-24 PROCEDURE — 70498 CT ANGIOGRAPHY NECK: CPT

## 2022-05-24 PROCEDURE — 250N000009 HC RX 250: Performed by: FAMILY MEDICINE

## 2022-05-24 PROCEDURE — 96360 HYDRATION IV INFUSION INIT: CPT | Mod: 59

## 2022-05-24 PROCEDURE — 36415 COLL VENOUS BLD VENIPUNCTURE: CPT | Performed by: FAMILY MEDICINE

## 2022-05-24 PROCEDURE — 70450 CT HEAD/BRAIN W/O DYE: CPT | Mod: XU

## 2022-05-24 PROCEDURE — 99285 EMERGENCY DEPT VISIT HI MDM: CPT | Mod: 25 | Performed by: FAMILY MEDICINE

## 2022-05-24 PROCEDURE — 70496 CT ANGIOGRAPHY HEAD: CPT

## 2022-05-24 PROCEDURE — 99285 EMERGENCY DEPT VISIT HI MDM: CPT | Mod: 25

## 2022-05-24 PROCEDURE — C9803 HOPD COVID-19 SPEC COLLECT: HCPCS

## 2022-05-24 PROCEDURE — 82947 ASSAY GLUCOSE BLOOD QUANT: CPT | Performed by: FAMILY MEDICINE

## 2022-05-24 PROCEDURE — 93005 ELECTROCARDIOGRAM TRACING: CPT

## 2022-05-24 PROCEDURE — 87636 SARSCOV2 & INF A&B AMP PRB: CPT | Performed by: FAMILY MEDICINE

## 2022-05-24 PROCEDURE — 93010 ELECTROCARDIOGRAM REPORT: CPT | Performed by: FAMILY MEDICINE

## 2022-05-24 PROCEDURE — 85610 PROTHROMBIN TIME: CPT | Performed by: FAMILY MEDICINE

## 2022-05-24 PROCEDURE — 83605 ASSAY OF LACTIC ACID: CPT | Performed by: FAMILY MEDICINE

## 2022-05-24 PROCEDURE — 83690 ASSAY OF LIPASE: CPT | Performed by: FAMILY MEDICINE

## 2022-05-24 RX ORDER — IOPAMIDOL 755 MG/ML
81 INJECTION, SOLUTION INTRAVASCULAR ONCE
Status: COMPLETED | OUTPATIENT
Start: 2022-05-24 | End: 2022-05-24

## 2022-05-24 RX ORDER — SODIUM CHLORIDE 9 MG/ML
INJECTION, SOLUTION INTRAVENOUS CONTINUOUS
Status: DISCONTINUED | OUTPATIENT
Start: 2022-05-24 | End: 2022-05-24 | Stop reason: HOSPADM

## 2022-05-24 RX ORDER — IOPAMIDOL 755 MG/ML
69 INJECTION, SOLUTION INTRAVASCULAR ONCE
Status: COMPLETED | OUTPATIENT
Start: 2022-05-24 | End: 2022-05-24

## 2022-05-24 RX ADMIN — SODIUM CHLORIDE 100 ML: 9 INJECTION, SOLUTION INTRAVENOUS at 15:25

## 2022-05-24 RX ADMIN — IOPAMIDOL 81 ML: 755 INJECTION, SOLUTION INTRAVENOUS at 15:24

## 2022-05-24 RX ADMIN — SODIUM CHLORIDE 100 ML: 9 INJECTION, SOLUTION INTRAVENOUS at 15:24

## 2022-05-24 RX ADMIN — SODIUM CHLORIDE: 9 INJECTION, SOLUTION INTRAVENOUS at 16:32

## 2022-05-24 RX ADMIN — IOPAMIDOL 69 ML: 755 INJECTION, SOLUTION INTRAVENOUS at 15:24

## 2022-05-24 RX ADMIN — SODIUM CHLORIDE 1000 ML: 9 INJECTION, SOLUTION INTRAVENOUS at 13:16
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
## 2022-05-24 NOTE — ED TRIAGE NOTES
Pt c/o dizziness, low BP for a few days, 80/60's.      Triage Assessment     Row Name 05/24/22 1222       Triage Assessment (Adult)    Airway WDL WDL       Respiratory WDL    Respiratory WDL WDL       Skin Circulation/Temperature WDL    Skin Circulation/Temperature WDL WDL       Cardiac WDL    Cardiac WDL WDL       Peripheral/Neurovascular WDL    Peripheral Neurovascular WDL WDL       Cognitive/Neuro/Behavioral WDL    Cognitive/Neuro/Behavioral WDL WDL

## 2022-05-24 NOTE — TELEPHONE ENCOUNTER
Verbal orders per Dr Thompson, Be evaluated in ED.    Pt instructed.    Advised 911 because pt is very dizzy.  Pt refused 911.    He states that his son and his wife can drive him.   Advised ED now and without delay.  Pt agrees and will have his wife and son drive him.    Alejandra Hale RN
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
## 2022-05-24 NOTE — ED NOTES
"Pt called out using call light \" Are these beds made with rubber? Because I am going to pee all over it\" Rn at bedside, offered patient urinal and he declined. Offered bedside commode and he declined. \" Why have I been here all day? How much longer?\" Explained course of ED visit. But chief complaint of dizziness should have fall precautions and encouraged use of aids to help to the bathroom. Pt upset with RN, removed monitoring and proceeded to walk to bathroom. MD updated.   "

## 2022-05-24 NOTE — TELEPHONE ENCOUNTER
"S-(situation): Spoke with pt.  Pt reports low blood pressure, really dizzy, chest heaviness, and back feels like someone is pushing on back left, middle side of back.    Began about a month ago but much worse over past 7-10 days.  Shortness of breath with exertion.  Dizziness is  Constant but much worse with position changes.  With position changes, feels like could almost fall over.  Pressure in head and very dizzy just sitting right now.  Feels exhausted  Much worse when lays down.    Denies breathing changes.  Denies struggling to breath.  Feels exhausted.    B-(background): yes, history of heart problems and type 2 diabetes.    A-(assessment): very dizzy, low BP, exhausted feeling, back pain in mid, left region.    R-(recommendations): Routed to Dr Thompson for 2nd level triage.    Alejandra Hale RN        Reason for Disposition    Fall in systolic BP > 20 mm Hg from normal and feeling dizzy, lightheaded, or weak    Patient sounds very sick or weak to the triager    Additional Information    Negative: Systolic BP < 90 and feeling dizzy, lightheaded, or weak    Negative: Started suddenly after an allergic medicine, an allergic food, or bee sting    Negative: Shock suspected (e.g., cold/pale/clammy skin, too weak to stand, low BP, rapid pulse)    Negative: Difficult to awaken or acting confused  (e.g., disoriented, slurred speech)    Negative: Fainted    Negative: Chest pain    Negative: Bleeding (e.g., vomiting blood, rectal bleeding or tarry stools, severe vaginal bleeding)    Negative: Extra heart beats or heart is beating fast  (i.e., \"palpitations\")    Negative: Sounds like a life-threatening emergency to the triager    Negative: Systolic BP < 80 and NOT dizzy, lightheaded or weak (feels normal)    Negative: Abdominal pain    Negative: Major surgery in the past month    Negative: Fever > 100.4 F (38.0 C)    Negative: Drinking very little and has signs of dehydration (e.g., no urine > 12 hours, very dry mouth, " "very lightheaded)    Answer Assessment - Initial Assessment Questions  1. BLOOD PRESSURE: \"What is the blood pressure?\" \"Did you take at least two measurements 5 minutes apart?\"      86/59, 112/66, 96/63  2. ONSET: \"When did you take your blood pressure?\"      Past few days  3. HOW: \"How did you obtain the blood pressure?\" (e.g., visiting nurse, automatic home BP monitor)      Home BP cuff  4. HISTORY: \"Do you have a history of low blood pressure?\" \"What is your blood pressure normally?\"      denies  5. MEDICATIONS: \"Are you taking any medications for blood pressure?\" If yes: \"Have they been changed recently?\"      Yes, no recent changes  6. PULSE RATE: \"Do you know what your pulse rate is?\"       70, 66  7. OTHER SYMPTOMS: \"Have you been sick recently?\" \"Have you had a recent injury?\"      denies  8. PREGNANCY: \"Is there any chance you are pregnant?\" \"When was your last menstrual period?\"      NA    Protocols used: LOW BLOOD PRESSURE-A-OH      "

## 2022-05-24 NOTE — TELEPHONE ENCOUNTER
Reason for Call:  Other appointment    Detailed comments: Pt is calling and has concerns to get an appt with someone due to Low blood pressure and diabetes is at 220 this am.  He would like to be seen soon.  Cannot find a appt to be seen soon.    Phone Number Patient can be reached at: Home number on file [phone] (home)    Best Time: any    Can we leave a detailed message on this number? YES    Call taken on 5/24/2022 at 9:44 AM by Elyssa Lombardo

## 2022-05-24 NOTE — DISCHARGE INSTRUCTIONS
Push fluids, rest.  Baby aspirin once daily  I would recommend that you hold your lisinopril for the next 7 to 10 days, checking your blood pressure twice daily and recording this for your physician for follow-up in the next 10 to 14 days for review.  As we discussed follow-up with respect to the right parotid mass with  is recommended as soon as possible.  Return to the emergency department if worse or changes.

## 2022-05-25 ENCOUNTER — TELEPHONE (OUTPATIENT)
Dept: OTOLARYNGOLOGY | Facility: CLINIC | Age: 77
End: 2022-05-25
Payer: COMMERCIAL

## 2022-05-25 DIAGNOSIS — Z87.19 HISTORY OF PAROTITIS: Primary | ICD-10-CM

## 2022-05-25 DIAGNOSIS — K11.8 MASS OF RIGHT PAROTID GLAND: ICD-10-CM

## 2022-05-25 NOTE — TELEPHONE ENCOUNTER
This is a patient I have seen before.  This mass seen on imaging is known and has been previously biopsied.     My recommendation would be to obtain an ultrasound-guided needle biopsy of the mass as his last biopsy came back nondiagnostic.     I can place an order for the biopsy and then we can arrange follow-up after the biopsy is completed.     If the patient is agreeable, we can set him up for a needle biopsy of the right parotid mass and set a follow-up for next available, I can move up the follow-up if needed.     IJL

## 2022-05-25 NOTE — TELEPHONE ENCOUNTER
Dr Alonso    Please review and indicate if appt with you or if should arrange at [place]?    Madelaine HERRERA   [facility]

## 2022-05-25 NOTE — TELEPHONE ENCOUNTER
Call placed to patient.  Pt agrees to have the order placed and will await result to determine follow up appt.    Pt request Dr Alonso be aware of date so he can review the result as soon as it becomes avail.    Madelaine HERRERA   [facility] RN*

## 2022-05-25 NOTE — TELEPHONE ENCOUNTER
Reason for Call:  Other appointment    Detailed comments: Pt states he was in the ER last night and was told to get a follow up appt. with Dr. Alonso in the next 10-14 days to review the mass found. Please advise.     Phone Number Patient can be reached at: Home number on file [phone] (home)    Best Time: any    Can we leave a detailed message on this number? YES    Call taken on 5/25/2022 at 9:03 AM by Kevyn Romero

## 2022-05-27 NOTE — TELEPHONE ENCOUNTER
Pt is having biopsy on 6/9 and I dont happen to seen any availbility until July . Per notes pt should be seen as soon as results become available . Please advise . Pt Is also concerned and will like someone to call back as soon as possible with this information . Thank you  !

## 2022-05-31 NOTE — TELEPHONE ENCOUNTER
Per previous documented conversation. Reassurance was provided to patient that based on result; Dr Alonso will determine follow up plan after he sees the result and pt will be notified of the plan ASAP provider seeing the result.  Called to review this with Pt again.      Madelaine HERRERA   [facility]

## 2022-06-09 ENCOUNTER — HOSPITAL ENCOUNTER (OUTPATIENT)
Dept: ULTRASOUND IMAGING | Facility: CLINIC | Age: 77
Discharge: HOME | End: 2022-06-09
Attending: OTOLARYNGOLOGY | Admitting: OTOLARYNGOLOGY
Payer: COMMERCIAL

## 2022-06-09 VITALS — SYSTOLIC BLOOD PRESSURE: 142 MMHG | DIASTOLIC BLOOD PRESSURE: 91 MMHG

## 2022-06-09 DIAGNOSIS — Z87.19 HISTORY OF PAROTITIS: ICD-10-CM

## 2022-06-09 DIAGNOSIS — K11.8 MASS OF RIGHT PAROTID GLAND: ICD-10-CM

## 2022-06-09 PROCEDURE — 250N000009 HC RX 250: Performed by: RADIOLOGY

## 2022-06-09 PROCEDURE — 88173 CYTOPATH EVAL FNA REPORT: CPT | Mod: TC | Performed by: OTOLARYNGOLOGY

## 2022-06-09 PROCEDURE — 272N000431 US BIOPSY PAROTID FINE NEEDLE ASPIRATION

## 2022-06-09 RX ADMIN — LIDOCAINE HYDROCHLORIDE 2 ML: 10 INJECTION, SOLUTION EPIDURAL; INFILTRATION; INTRACAUDAL; PERINEURAL at 11:50

## 2022-06-13 LAB
PATH REPORT.COMMENTS IMP SPEC: NORMAL
PATH REPORT.COMMENTS IMP SPEC: NORMAL
PATH REPORT.FINAL DX SPEC: NORMAL
PATH REPORT.GROSS SPEC: NORMAL
PATH REPORT.MICROSCOPIC SPEC OTHER STN: NORMAL
PATH REPORT.RELEVANT HX SPEC: NORMAL

## 2022-06-13 PROCEDURE — 88173 CYTOPATH EVAL FNA REPORT: CPT | Mod: 26

## 2022-06-14 ENCOUNTER — TELEPHONE (OUTPATIENT)
Dept: OTOLARYNGOLOGY | Facility: CLINIC | Age: 77
End: 2022-06-14
Payer: COMMERCIAL

## 2022-06-14 NOTE — TELEPHONE ENCOUNTER
Advised.  Pt scheduled.  Will begin a list of questions and things they wish to discuss for upcoming appt. Pt states the pressure in the teeth  Jaw and ear is hoping for surgery if will stop or reduce the sx.    Madelaine HERRERA   [facility] RN

## 2022-06-14 NOTE — TELEPHONE ENCOUNTER
----- Message from Colt Alonso MD sent at 6/13/2022  5:36 PM CDT -----  Regarding: Needle biopsy results  Please let the patient know that the fine-needle biopsy of the right parotid mass.  They think that this is likely a benign neoplasm.  I can see him for follow-up in clinic to discuss.  I can see him on 6/22/2022 at 8:20 AM.    IJL

## 2022-06-20 NOTE — PROGRESS NOTES
Chief Complaint   Patient presents with     Results     Patient reports having questions about Parotid mass:Patient still having headaches generalize on right side of face all over upper temple,radiating into sinus/having jaw pain/right eye ache and the dizziness subsided.Here to follow up with provider     History of Present Illness  Pancho Najera Jr. is a 77 year old male who presents today for follow-up.  I evaluated the patient initially in September 2021.  The patient had a right parotid lesion that was in previous imaging.  The patient underwent an ultrasound-guided needle biopsy on 9/14/2021. The final biopsy was atypical cells of undetermined significance. There were no obvious malignant cells in the biopsy.  We discussed close observation with consideration of repeat biopsy versus potential repeat imaging including MRI for follow-up.  When I saw the patient for follow-up in October 2021, the area in his right parotid seem to have resolved.  We discussed observation.  The patient was undergoing imaging to rule out acute vertigo.  My review of the patient's CT angiogram of the head and neck from 5/24/2022 again demonstrates a mass straddling the superficial and deep lobes of the parotid gland extending behind the mandible measuring 2.8 cm in greatest dimension.  Given that the lesion was redemonstrated on imaging, I ordered a repeat ultrasound-guided needle biopsy of the parotid.  The needle biopsy performed 6/9/2022 was satisfactory for evaluation.  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  The patient returns today for follow-up.     Since last seeing the patient, the patient reports multiple concerns.  He reports having occipital headaches that extend over the right parietal area.  His headaches are always on the right.  He previously saw neurology and underwent occipital nerve injection with good results  but he did not continue to follow with neurology.  He is also having significant neck and back discomfort.  He reports some imbalance from time to time due to an injury where something fell on his head.  He denies any dysphagia, odynophagia, pharyngodynia, otalgia, hemoptysis.  He feels like his voice is a bit hoarse especially when his throat is dry.  He is a mouth breather at nighttime. He denies any other neck lumps/bumps/swelling.  No unintentional weight loss.  He is a lifetime non-smoker but has had some secondhand smoke exposure.    Past Medical History  Patient Active Problem List   Diagnosis     Anxiety state     Chest pain     Depression     Obstructive sleep apnea syndrome     History of respiratory system disease     Pleural plaque     Multiple pulmonary nodules     Ventricular premature beats     Ventricular bigeminy     Chronic left-sided low back pain without sciatica     Chronic seasonal allergic rhinitis     Stage 3a chronic kidney disease (H)     Mixed hyperlipidemia     Type 2 diabetes mellitus with diabetic polyneuropathy, without long-term current use of insulin (H)     Balance problems     Gastroesophageal reflux disease with esophagitis     Plantar fasciitis, bilateral     Current Medications     Current Outpatient Medications:      Atorvastatin Calcium (LIPITOR PO), Take 10 mg by mouth daily , Disp: , Rfl:      blood glucose (FREESTYLE TEST STRIPS) test strip, 1 each by Other route, Disp: , Rfl:      blood glucose monitoring (SOFTCLIX) lancets, , Disp: , Rfl:      clonazePAM (KLONOPIN) 0.5 MG tablet, Take 0.5 mg by mouth 3 times daily as needed , Disp: , Rfl:      GLIMEPIRIDE PO, Take 4 mg by mouth 2 times daily (before meals), Disp: , Rfl:      ibuprofen (ADVIL/MOTRIN) 200 MG capsule, Take 200 mg by mouth every 4 hours as needed for fever, Disp: , Rfl:      cycloSPORINE (RESTASIS) 0.05 % ophthalmic emulsion, , Disp: , Rfl:     Allergies  Allergies   Allergen Reactions     Pneumococcal  Vaccine Other (See Comments)     swelling  swelling       Sulfa Drugs Hives       Social History   Social History     Socioeconomic History     Marital status:    Tobacco Use     Smoking status: Never Smoker     Smokeless tobacco: Never Used   Vaping Use     Vaping Use: Never used   Substance and Sexual Activity     Alcohol use: Not Currently     Drug use: Never     Sexual activity: Not Currently       Family History  Family History   Problem Relation Age of Onset     Diabetes Mother      Coronary Artery Disease Mother      Uterine Cancer Mother      Lung Cancer Father      Diabetes Father        Review of Systems  As per HPI and PMHx, otherwise 10+ comprehensive system review is negative.    Physical Exam  /82 (BP Location: Right arm, Patient Position: Sitting, Cuff Size: Adult Large)   Pulse 71   Temp (!) 95.6  F (35.3  C) (Tympanic)   Wt 98.9 kg (218 lb)   SpO2 97%   BMI 32.19 kg/m    GENERAL: Patient is a pleasant, cooperative 77 year old male in no acute distress.  HEAD: Normocephalic, atraumatic.  Hair and scalp are normal.  EYES: Pupils are equal, round, reactive to light and accommodation.  Extraocular movements are intact.  The sclera nonicteric without injection.  The extraocular structures are normal.  EARS: Normal shape and symmetry.  No tenderness when palpating the mastoid or tragal areas bilaterally.    NOSE: Nares are patent.  Nasal mucosa is again moist.  Rightward anterior and leftward posterior nasal septal deviation.  No nasal cavity masses, polyps, or mucopurulence on anterior rhinoscopy.  ORAL CAVITY: Dentition is in reasonably good repair.  Mucous membranes are slightly dry.  Tongue is mobile, protrudes to the midline.  Palate elevates symmetrically.  Tonsils are 1.5+, symmetric.  No erythema or exudate.  No oral cavity or oropharyngeal masses, lesions, ulcerations, leukoplakia.  NECK: Supple, trachea is midline.    Patient of the right parotid gland no longer reveals a mass  or lesion.  There no additional palpable cervical lymphadenopathy or masses bilaterally.  Palpation of the left parotid and bilateral submandibular areas reveal no masses.  No thyromegaly.  NEUROLOGIC: Cranial nerves II through XII are grossly intact.  Voice is strong.  Patient is House-Brackmann I/VI bilaterally.  CARDIOVASCULAR: Extremities are warm and well-perfused.  No significant peripheral edema.  RESPIRATORY: Patient has nonlabored breathing without cough, wheeze, stridor.  PSYCHIATRIC: Patient is alert and oriented.  Mood and affect appear normal.  SKIN: Warm and dry.  No scalp, face, or neck lesions noted.    Assessment and Plan     ICD-10-CM    1. History of parotitis  Z87.19 Adult Neurology  Referral   2. Mass of right parotid gland  K11.8 Adult Neurology  Referral     US Parotid   3. Obstructive sleep apnea syndrome  G47.33 Adult Neurology  Referral   4. Intolerance of continuous positive airway pressure (CPAP) ventilation  Z78.9 Adult Neurology  Referral   5. Xerostomia  K11.7 Adult Neurology  Referral   6. Encounter for screening for other viral diseases  Z11.59 Adult Neurology  Referral     It was my pleasure seeing Pancho Najera Jr. today in clinic.  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  This would be a very difficult tumor to remove would almost certainly cause temporary facial nerve paresis and be at high risk for nerve injury given its appearance on imaging.  Given the patient's other health issues, I am not certain parotidectomy be the best choice for this patient.  The tumor in his right parotid gland is benign and is not causing any of his other headache symptoms or his neck and back issues.  I encouraged him to follow-up with his primary care provider for his neck and back issues.  I did place a referral to neurology for headache evaluation as he likely has occipital  neuralgia or cluster headache.    Given that we know that the mass is benign on needle biopsy, we can obtain another ultrasound in 12 months to follow size of the lesion.      Colt Alonso MD  Department of Otolaryngology-Head and Neck Surgery  [facility]

## 2022-06-22 ENCOUNTER — OFFICE VISIT (OUTPATIENT)
Dept: OTOLARYNGOLOGY | Facility: CLINIC | Age: 77
End: 2022-06-22
Payer: COMMERCIAL

## 2022-06-22 VITALS
WEIGHT: 218 LBS | OXYGEN SATURATION: 97 % | BODY MASS INDEX: 32.19 KG/M2 | DIASTOLIC BLOOD PRESSURE: 82 MMHG | TEMPERATURE: 95.6 F | SYSTOLIC BLOOD PRESSURE: 121 MMHG | HEART RATE: 71 BPM

## 2022-06-22 DIAGNOSIS — Z11.59 ENCOUNTER FOR SCREENING FOR OTHER VIRAL DISEASES: ICD-10-CM

## 2022-06-22 DIAGNOSIS — K11.7 XEROSTOMIA: ICD-10-CM

## 2022-06-22 DIAGNOSIS — G47.33 OBSTRUCTIVE SLEEP APNEA SYNDROME: ICD-10-CM

## 2022-06-22 DIAGNOSIS — Z87.19 HISTORY OF PAROTITIS: Primary | ICD-10-CM

## 2022-06-22 DIAGNOSIS — Z78.9 INTOLERANCE OF CONTINUOUS POSITIVE AIRWAY PRESSURE (CPAP) VENTILATION: ICD-10-CM

## 2022-06-22 DIAGNOSIS — K11.8 MASS OF RIGHT PAROTID GLAND: ICD-10-CM

## 2022-06-22 PROCEDURE — 99214 OFFICE O/P EST MOD 30 MIN: CPT | Performed by: OTOLARYNGOLOGY

## 2022-06-22 NOTE — NURSING NOTE
"Initial /82 (BP Location: Right arm, Patient Position: Sitting, Cuff Size: Adult Large)   Pulse 71   Temp (!) 95.6  F (35.3  C) (Tympanic)   Wt 98.9 kg (218 lb)   SpO2 97%   BMI 32.19 kg/m   Estimated body mass index is 32.19 kg/m  as calculated from the following:    Height as of 10/14/21: 1.753 m (5' 9\").    Weight as of this encounter: 98.9 kg (218 lb). .    Tea Borges MA    "

## 2022-06-22 NOTE — PATIENT INSTRUCTIONS
Per physician instructions      If you have questions or concerns on any instructions given to you by your provider today or if you need to schedule an appointment, you can reach us at [phone].

## 2022-06-22 NOTE — LETTER
6/22/2022         RE: Pancho Najera Jr.  [address]  [facility] [address]        Dear Colleague,    Thank you for referring your patient, Pancho Najera Jr., to the [facility]. Please see a copy of my visit note below.    Chief Complaint   Patient presents with     Results     Patient reports having questions about Parotid mass:Patient still having headaches generalize on right side of face all over upper temple,radiating into sinus/having jaw pain/right eye ache and the dizziness subsided.Here to follow up with provider     History of Present Illness  Pancho Najera Jr. is a 77 year old male who presents today for follow-up.  I evaluated the patient initially in September 2021.  The patient had a right parotid lesion that was in previous imaging.  The patient underwent an ultrasound-guided needle biopsy on 9/14/2021. The final biopsy was atypical cells of undetermined significance. There were no obvious malignant cells in the biopsy.  We discussed close observation with consideration of repeat biopsy versus potential repeat imaging including MRI for follow-up.  When I saw the patient for follow-up in October 2021, the area in his right parotid seem to have resolved.  We discussed observation.  The patient was undergoing imaging to rule out acute vertigo.  My review of the patient's CT angiogram of the head and neck from 5/24/2022 again demonstrates a mass straddling the superficial and deep lobes of the parotid gland extending behind the mandible measuring 2.8 cm in greatest dimension.  Given that the lesion was redemonstrated on imaging, I ordered a repeat ultrasound-guided needle biopsy of the parotid.  The needle biopsy performed 6/9/2022 was satisfactory for evaluation.  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  The patient returns today for follow-up.     Since last seeing  the patient, the patient reports multiple concerns.  He reports having occipital headaches that extend over the right parietal area.  His headaches are always on the right.  He previously saw neurology and underwent occipital nerve injection with good results but he did not continue to follow with neurology.  He is also having significant neck and back discomfort.  He reports some imbalance from time to time due to an injury where something fell on his head.  He denies any dysphagia, odynophagia, pharyngodynia, otalgia, hemoptysis.  He feels like his voice is a bit hoarse especially when his throat is dry.  He is a mouth breather at nighttime. He denies any other neck lumps/bumps/swelling.  No unintentional weight loss.  He is a lifetime non-smoker but has had some secondhand smoke exposure.    Past Medical History  Patient Active Problem List   Diagnosis     Anxiety state     Chest pain     Depression     Obstructive sleep apnea syndrome     History of respiratory system disease     Pleural plaque     Multiple pulmonary nodules     Ventricular premature beats     Ventricular bigeminy     Chronic left-sided low back pain without sciatica     Chronic seasonal allergic rhinitis     Stage 3a chronic kidney disease (H)     Mixed hyperlipidemia     Type 2 diabetes mellitus with diabetic polyneuropathy, without long-term current use of insulin (H)     Balance problems     Gastroesophageal reflux disease with esophagitis     Plantar fasciitis, bilateral     Current Medications     Current Outpatient Medications:      Atorvastatin Calcium (LIPITOR PO), Take 10 mg by mouth daily , Disp: , Rfl:      blood glucose (FREESTYLE TEST STRIPS) test strip, 1 each by Other route, Disp: , Rfl:      blood glucose monitoring (SOFTCLIX) lancets, , Disp: , Rfl:      clonazePAM (KLONOPIN) 0.5 MG tablet, Take 0.5 mg by mouth 3 times daily as needed , Disp: , Rfl:      GLIMEPIRIDE PO, Take 4 mg by mouth 2 times daily (before meals), Disp: ,  Rfl:      ibuprofen (ADVIL/MOTRIN) 200 MG capsule, Take 200 mg by mouth every 4 hours as needed for fever, Disp: , Rfl:      cycloSPORINE (RESTASIS) 0.05 % ophthalmic emulsion, , Disp: , Rfl:     Allergies  Allergies   Allergen Reactions     Pneumococcal Vaccine Other (See Comments)     swelling  swelling       Sulfa Drugs Hives       Social History   Social History     Socioeconomic History     Marital status:    Tobacco Use     Smoking status: Never Smoker     Smokeless tobacco: Never Used   Vaping Use     Vaping Use: Never used   Substance and Sexual Activity     Alcohol use: Not Currently     Drug use: Never     Sexual activity: Not Currently       Family History  Family History   Problem Relation Age of Onset     Diabetes Mother      Coronary Artery Disease Mother      Uterine Cancer Mother      Lung Cancer Father      Diabetes Father        Review of Systems  As per HPI and PMHx, otherwise 10+ comprehensive system review is negative.    Physical Exam  /82 (BP Location: Right arm, Patient Position: Sitting, Cuff Size: Adult Large)   Pulse 71   Temp (!) 95.6  F (35.3  C) (Tympanic)   Wt 98.9 kg (218 lb)   SpO2 97%   BMI 32.19 kg/m    GENERAL: Patient is a pleasant, cooperative 77 year old male in no acute distress.  HEAD: Normocephalic, atraumatic.  Hair and scalp are normal.  EYES: Pupils are equal, round, reactive to light and accommodation.  Extraocular movements are intact.  The sclera nonicteric without injection.  The extraocular structures are normal.  EARS: Normal shape and symmetry.  No tenderness when palpating the mastoid or tragal areas bilaterally.    NOSE: Nares are patent.  Nasal mucosa is again moist.  Rightward anterior and leftward posterior nasal septal deviation.  No nasal cavity masses, polyps, or mucopurulence on anterior rhinoscopy.  ORAL CAVITY: Dentition is in reasonably good repair.  Mucous membranes are slightly dry.  Tongue is mobile, protrudes to the midline.   Palate elevates symmetrically.  Tonsils are 1.5+, symmetric.  No erythema or exudate.  No oral cavity or oropharyngeal masses, lesions, ulcerations, leukoplakia.  NECK: Supple, trachea is midline.    Patient of the right parotid gland no longer reveals a mass or lesion.  There no additional palpable cervical lymphadenopathy or masses bilaterally.  Palpation of the left parotid and bilateral submandibular areas reveal no masses.  No thyromegaly.  NEUROLOGIC: Cranial nerves II through XII are grossly intact.  Voice is strong.  Patient is House-Brackmann I/VI bilaterally.  CARDIOVASCULAR: Extremities are warm and well-perfused.  No significant peripheral edema.  RESPIRATORY: Patient has nonlabored breathing without cough, wheeze, stridor.  PSYCHIATRIC: Patient is alert and oriented.  Mood and affect appear normal.  SKIN: Warm and dry.  No scalp, face, or neck lesions noted.    Assessment and Plan     ICD-10-CM    1. History of parotitis  Z87.19 Adult Neurology  Referral   2. Mass of right parotid gland  K11.8 Adult Neurology  Referral     US Parotid   3. Obstructive sleep apnea syndrome  G47.33 Adult Neurology  Referral   4. Intolerance of continuous positive airway pressure (CPAP) ventilation  Z78.9 Adult Neurology  Referral   5. Xerostomia  K11.7 Adult Neurology  Referral   6. Encounter for screening for other viral diseases  Z11.59 Adult Neurology  Referral     It was my pleasure seeing Pancho Najera Jr. today in clinic.  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  This would be a very difficult tumor to remove would almost certainly cause temporary facial nerve paresis and be at high risk for nerve injury given its appearance on imaging.  Given the patient's other health issues, I am not certain parotidectomy be the best choice for this patient.  The tumor in his right parotid gland is benign and is  not causing any of his other headache symptoms or his neck and back issues.  I encouraged him to follow-up with his primary care provider for his neck and back issues.  I did place a referral to neurology for headache evaluation as he likely has occipital neuralgia or cluster headache.    Given that we know that the mass is benign on needle biopsy, we can obtain another ultrasound in 12 months to follow size of the lesion.      Colt Alonso MD  Department of Otolaryngology-Head and Neck Surgery  [facility]         Again, thank you for allowing me to participate in the care of your patient.        Sincerely,        Colt Alonso MD

## 2022-06-29 ENCOUNTER — TRANSFERRED RECORDS (OUTPATIENT)
Dept: HEALTH INFORMATION MANAGEMENT | Facility: CLINIC | Age: 77
End: 2022-06-29

## 2022-07-03 ENCOUNTER — LAB (OUTPATIENT)
Dept: FAMILY MEDICINE | Facility: CLINIC | Age: 77
End: 2022-07-03
Payer: COMMERCIAL

## 2022-07-03 DIAGNOSIS — Z20.822 ENCOUNTER FOR LABORATORY TESTING FOR COVID-19 VIRUS: ICD-10-CM

## 2022-07-03 PROCEDURE — 99207 PR NO CHARGE LOS: CPT

## 2022-07-03 PROCEDURE — U0005 INFEC AGEN DETEC AMPLI PROBE: HCPCS

## 2022-07-03 PROCEDURE — U0003 INFECTIOUS AGENT DETECTION BY NUCLEIC ACID (DNA OR RNA); SEVERE ACUTE RESPIRATORY SYNDROME CORONAVIRUS 2 (SARS-COV-2) (CORONAVIRUS DISEASE [COVID-19]), AMPLIFIED PROBE TECHNIQUE, MAKING USE OF HIGH THROUGHPUT TECHNOLOGIES AS DESCRIBED BY CMS-2020-01-R: HCPCS

## 2022-07-04 LAB — SARS-COV-2 RNA RESP QL NAA+PROBE: NEGATIVE

## 2022-07-05 ENCOUNTER — OFFICE VISIT (OUTPATIENT)
Dept: FAMILY MEDICINE | Facility: CLINIC | Age: 77
End: 2022-07-05
Payer: COMMERCIAL

## 2022-07-05 VITALS
HEART RATE: 63 BPM | DIASTOLIC BLOOD PRESSURE: 84 MMHG | HEIGHT: 69 IN | OXYGEN SATURATION: 98 % | SYSTOLIC BLOOD PRESSURE: 122 MMHG | RESPIRATION RATE: 16 BRPM | BODY MASS INDEX: 31.49 KG/M2 | TEMPERATURE: 96.7 F | WEIGHT: 212.6 LBS

## 2022-07-05 DIAGNOSIS — E11.42 TYPE 2 DIABETES MELLITUS WITH DIABETIC POLYNEUROPATHY, WITHOUT LONG-TERM CURRENT USE OF INSULIN (H): ICD-10-CM

## 2022-07-05 DIAGNOSIS — Z01.818 PREOP GENERAL PHYSICAL EXAM: Primary | ICD-10-CM

## 2022-07-05 DIAGNOSIS — M51.369 DDD (DEGENERATIVE DISC DISEASE), LUMBAR: ICD-10-CM

## 2022-07-05 PROBLEM — M85.88 OSTEOPENIA OF LUMBAR SPINE: Status: ACTIVE | Noted: 2021-05-14

## 2022-07-05 PROBLEM — R32 URINARY INCONTINENCE, UNSPECIFIED TYPE: Status: ACTIVE | Noted: 2018-06-22

## 2022-07-05 PROBLEM — G62.9 POLYNEUROPATHY: Status: ACTIVE | Noted: 2017-02-20

## 2022-07-05 PROBLEM — Z77.090 ASBESTOS EXPOSURE: Status: ACTIVE | Noted: 2018-06-22

## 2022-07-05 PROBLEM — H91.93 HEARING LOSS, BILATERAL: Status: ACTIVE | Noted: 2018-10-05

## 2022-07-05 PROBLEM — E55.9 VITAMIN D DEFICIENCY: Status: ACTIVE | Noted: 2018-10-05

## 2022-07-05 LAB
ANION GAP SERPL CALCULATED.3IONS-SCNC: 5 MMOL/L (ref 3–14)
BUN SERPL-MCNC: 26 MG/DL (ref 7–30)
CALCIUM SERPL-MCNC: 9.5 MG/DL (ref 8.5–10.1)
CHLORIDE BLD-SCNC: 101 MMOL/L (ref 94–109)
CO2 SERPL-SCNC: 27 MMOL/L (ref 20–32)
CREAT SERPL-MCNC: 0.97 MG/DL (ref 0.66–1.25)
ERYTHROCYTE [DISTWIDTH] IN BLOOD BY AUTOMATED COUNT: 12 % (ref 10–15)
GFR SERPL CREATININE-BSD FRML MDRD: 81 ML/MIN/1.73M2
GLUCOSE BLD-MCNC: 191 MG/DL (ref 70–99)
HBA1C MFR BLD: 8.2 % (ref 0–5.6)
HCT VFR BLD AUTO: 39.8 % (ref 40–53)
HGB BLD-MCNC: 14.3 G/DL (ref 13.3–17.7)
MCH RBC QN AUTO: 31.6 PG (ref 26.5–33)
MCHC RBC AUTO-ENTMCNC: 35.9 G/DL (ref 31.5–36.5)
MCV RBC AUTO: 88 FL (ref 78–100)
PLATELET # BLD AUTO: 220 10E3/UL (ref 150–450)
POTASSIUM BLD-SCNC: 3.9 MMOL/L (ref 3.4–5.3)
RBC # BLD AUTO: 4.52 10E6/UL (ref 4.4–5.9)
SODIUM SERPL-SCNC: 133 MMOL/L (ref 133–144)
WBC # BLD AUTO: 11.6 10E3/UL (ref 4–11)

## 2022-07-05 PROCEDURE — 83036 HEMOGLOBIN GLYCOSYLATED A1C: CPT | Performed by: NURSE PRACTITIONER

## 2022-07-05 PROCEDURE — 36415 COLL VENOUS BLD VENIPUNCTURE: CPT | Performed by: NURSE PRACTITIONER

## 2022-07-05 PROCEDURE — 99214 OFFICE O/P EST MOD 30 MIN: CPT | Performed by: NURSE PRACTITIONER

## 2022-07-05 PROCEDURE — 80048 BASIC METABOLIC PNL TOTAL CA: CPT | Performed by: NURSE PRACTITIONER

## 2022-07-05 PROCEDURE — 85027 COMPLETE CBC AUTOMATED: CPT | Performed by: NURSE PRACTITIONER

## 2022-07-05 RX ORDER — PREDNISONE 20 MG/1
TABLET ORAL
COMMUNITY
Start: 2022-07-01 | End: 2022-08-23

## 2022-07-05 ASSESSMENT — ANXIETY QUESTIONNAIRES
7. FEELING AFRAID AS IF SOMETHING AWFUL MIGHT HAPPEN: SEVERAL DAYS
GAD7 TOTAL SCORE: 10
4. TROUBLE RELAXING: NOT AT ALL
3. WORRYING TOO MUCH ABOUT DIFFERENT THINGS: NEARLY EVERY DAY
7. FEELING AFRAID AS IF SOMETHING AWFUL MIGHT HAPPEN: SEVERAL DAYS
2. NOT BEING ABLE TO STOP OR CONTROL WORRYING: NEARLY EVERY DAY
8. IF YOU CHECKED OFF ANY PROBLEMS, HOW DIFFICULT HAVE THESE MADE IT FOR YOU TO DO YOUR WORK, TAKE CARE OF THINGS AT HOME, OR GET ALONG WITH OTHER PEOPLE?: SOMEWHAT DIFFICULT
6. BECOMING EASILY ANNOYED OR IRRITABLE: SEVERAL DAYS
1. FEELING NERVOUS, ANXIOUS, OR ON EDGE: SEVERAL DAYS
GAD7 TOTAL SCORE: 10
GAD7 TOTAL SCORE: 10
5. BEING SO RESTLESS THAT IT IS HARD TO SIT STILL: SEVERAL DAYS

## 2022-07-05 ASSESSMENT — PATIENT HEALTH QUESTIONNAIRE - PHQ9
SUM OF ALL RESPONSES TO PHQ QUESTIONS 1-9: 2
SUM OF ALL RESPONSES TO PHQ QUESTIONS 1-9: 2
10. IF YOU CHECKED OFF ANY PROBLEMS, HOW DIFFICULT HAVE THESE PROBLEMS MADE IT FOR YOU TO DO YOUR WORK, TAKE CARE OF THINGS AT HOME, OR GET ALONG WITH OTHER PEOPLE: NOT DIFFICULT AT ALL

## 2022-07-05 ASSESSMENT — PAIN SCALES - GENERAL: PAINLEVEL: SEVERE PAIN (7)

## 2022-07-05 NOTE — PROGRESS NOTES
[facility]  [address] [place] [id number]  Phone: [phone]  Fax: [phone]  Primary Provider: Baudilio Brown        PREOPERATIVE EVALUATION:  Today's date: 7/5/2022    Pancho Najera Jr. is a 77 year old male who presents for a preoperative evaluation.    Surgical Information:  Surgery/Procedure: Lumbar 2-3 decompression left diskectomy  Surgery Location: [facility]  Surgeon: Francesco Johnston Md  Surgery Date: 7/6/2022  Time of Surgery: TBD  Where patient plans to recover: At home with family  Fax number for surgical facility: [phone]    Type of Anesthesia Anticipated: General    Assessment & Plan     The proposed surgical procedure is considered INTERMEDIATE risk.    (Z01.818) Preop general physical exam  (primary encounter diagnosis)  Comment:   Plan: CBC with platelets, Basic metabolic panel  (Ca,        Cl, CO2, Creat, Gluc, K, Na, BUN), Hemoglobin         A1c            (M51.36) DDD (degenerative disc disease), lumbar  Comment:   Plan:     (E11.42) Type 2 diabetes mellitus with diabetic polyneuropathy, without long-term current use of insulin (H)  Comment:   Plan: Hemoglobin A1c                   Risks and Recommendations:  The patient has the following additional risks and recommendations for perioperative complications:   -A1c remains at 8.2       Medication Instructions:   - sulfonylurea (e.g. glyburide, glipizide): HOLD day of surgery    RECOMMENDATION:  APPROVAL GIVEN to proceed with proposed procedure, without further diagnostic evaluation.            Subjective     HPI related to upcoming procedure:  Lumbar 2-3 decompression left diskectomy        Preop Questions 7/5/2022   1. Have you ever had a heart attack or stroke? No   2. Have you ever had surgery on your heart or blood vessels, such as a stent placement, a coronary artery bypass, or surgery on an artery in your head, neck, heart, or legs? No   3. Do you have chest pain with  activity? No   4. Do you have a history of  heart failure? No   5. Do you currently have a cold, bronchitis or symptoms of other infection? No   6. Do you have a cough, shortness of breath, or wheezing? No   7. Do you or anyone in your family have previous history of blood clots? No   8. Do you or does anyone in your family have a serious bleeding problem such as prolonged bleeding following surgeries or cuts? No   9. Have you ever had problems with anemia or been told to take iron pills? No   10. Have you had any abnormal blood loss such as black, tarry or bloody stools? No   11. Have you ever had a blood transfusion? No   12. Are you willing to have a blood transfusion if it is medically needed before, during, or after your surgery? Yes   13. Have you or any of your relatives ever had problems with anesthesia? YES - son had problems no breathing problems    14. Do you have sleep apnea, excessive snoring or daytime drowsiness? YES has a machine    14a. Do you have a CPAP machine? Yes   15. Do you have any artifical heart valves or other implanted medical devices like a pacemaker, defibrillator, or continuous glucose monitor? No   16. Do you have artificial joints? No   17. Are you allergic to latex? No     Health Care Directive:  Patient does not have a Health Care Directive or Living Will: Discussed advance care planning with patient; however, patient declined at this time.    Preoperative Review of :   reviewed - no record of controlled substances prescribed.      Status of Chronic Conditions:  See problem list for active medical problems.  Problems all longstanding and stable, except as noted/documented.  See ROS for pertinent symptoms related to these conditions.      Review of Systems  Constitutional, neuro, ENT, endocrine, pulmonary, cardiac, gastrointestinal, genitourinary, musculoskeletal, integument and psychiatric systems are negative, except as otherwise noted.    Patient Active Problem List     Diagnosis Date Noted     Osteopenia of lumbar spine 05/14/2021     Priority: Medium     Formatting of this note might be different from the original.  2016 DEXA.       Stage 3a chronic kidney disease (H) 09/20/2020     Priority: Medium     Formatting of this note might be different from the original.  Nl Renal US 2019       Chronic left-sided low back pain without sciatica 10/05/2018     Priority: Medium     Gastroesophageal reflux disease with esophagitis 10/05/2018     Priority: Medium     Plantar fasciitis, bilateral 10/05/2018     Priority: Medium     BMI 32.0-32.9,adult 10/05/2018     Priority: Medium     Hearing loss, bilateral 10/05/2018     Priority: Medium     Vitamin D deficiency 10/05/2018     Priority: Medium     Chronic seasonal allergic rhinitis 06/22/2018     Priority: Medium     Mixed hyperlipidemia 06/22/2018     Priority: Medium     Type 2 diabetes mellitus with diabetic polyneuropathy, without long-term current use of insulin (H) 06/22/2018     Priority: Medium     Balance problems 06/22/2018     Priority: Medium     Asbestos exposure 06/22/2018     Priority: Medium     Urinary incontinence, unspecified type 06/22/2018     Priority: Medium     Polyneuropathy 02/20/2017     Priority: Medium     Obstructive sleep apnea syndrome 03/25/2016     Priority: Medium     Pleural plaque 03/25/2016     Priority: Medium     Multiple pulmonary nodules 03/25/2016     Priority: Medium     Chest pain 10/22/2010     Priority: Medium     History of respiratory system disease 10/22/2010     Priority: Medium     Ventricular premature beats 10/22/2010     Priority: Medium     Ventricular bigeminy 10/22/2010     Priority: Medium     Atrophic gastritis 01/01/2010     Priority: Medium     Anxiety state 04/17/2009     Priority: Medium     Depression 04/17/2009     Priority: Medium      History reviewed. No pertinent past medical history.  History reviewed. No pertinent surgical history.  Current Outpatient Medications  "  Medication Sig Dispense Refill     Atorvastatin Calcium (LIPITOR PO) Take 10 mg by mouth daily        blood glucose (FREESTYLE TEST STRIPS) test strip 1 each by Other route       blood glucose monitoring (SOFTCLIX) lancets        clonazePAM (KLONOPIN) 0.5 MG tablet Take 0.5 mg by mouth 3 times daily as needed        GLIMEPIRIDE PO Take 4 mg by mouth 2 times daily (before meals)       ibuprofen (ADVIL/MOTRIN) 200 MG capsule Take 200 mg by mouth every 4 hours as needed for fever       predniSONE (DELTASONE) 20 MG tablet        aspirin (ASA) 81 MG EC tablet Take 81 mg by mouth (Patient not taking: Reported on 7/5/2022)       cycloSPORINE (RESTASIS) 0.05 % ophthalmic emulsion  (Patient not taking: No sig reported)         Allergies   Allergen Reactions     Pneumococcal Vaccine Other (See Comments)     swelling  swelling       Sulfa Drugs Hives     Lisinopril Cough        Social History     Tobacco Use     Smoking status: Never Smoker     Smokeless tobacco: Never Used   Substance Use Topics     Alcohol use: Not Currently     Family History   Problem Relation Age of Onset     Diabetes Mother      Coronary Artery Disease Mother      Uterine Cancer Mother      Lung Cancer Father      Diabetes Father      Diabetes Sister      History   Drug Use Unknown         Objective     /84   Pulse 63   Temp (!) 96.7  F (35.9  C) (Tympanic)   Resp 16   Ht 1.74 m (5' 8.5\")   Wt 96.4 kg (212 lb 9.6 oz)   SpO2 98%   BMI 31.86 kg/m      Physical Exam    GENERAL APPEARANCE: healthy, alert and no distress     EYES: EOMI, PERRL     HENT: ear canals and TM's normal and nose and mouth without ulcers or lesions     NECK: no adenopathy, no asymmetry, masses, or scars and thyroid normal to palpation     RESP: lungs clear to auscultation - no rales, rhonchi or wheezes     CV: regular rates and rhythm, normal S1 S2, no S3 or S4 and no murmur, click or rub     ABDOMEN:  soft, nontender, no HSM or masses and bowel sounds normal     MS: " extremities normal- no gross deformities noted, no evidence of inflammation in joints, FROM in all extremities.     SKIN: no suspicious lesions or rashes     NEURO: Normal strength and tone, sensory exam grossly normal, mentation intact and speech normal     PSYCH: mentation appears normal. and affect normal/bright     LYMPHATICS: No cervical adenopathy    Recent Labs   Lab Test 05/24/22  1313 08/20/21  0840   HGB 13.3  --      --    INR 1.08  --      --    POTASSIUM 4.4  --    CR 1.05 1.3        Diagnostics:  Recent Results (from the past 24 hour(s))   Hemoglobin A1c    Collection Time: 07/05/22  1:59 PM   Result Value Ref Range    Hemoglobin A1C 8.2 (H) 0.0 - 5.6 %   CBC with platelets    Collection Time: 07/05/22  1:59 PM   Result Value Ref Range    WBC Count 11.6 (H) 4.0 - 11.0 10e3/uL    RBC Count 4.52 4.40 - 5.90 10e6/uL    Hemoglobin 14.3 13.3 - 17.7 g/dL    Hematocrit 39.8 (L) 40.0 - 53.0 %    MCV 88 78 - 100 fL    MCH 31.6 26.5 - 33.0 pg    MCHC 35.9 31.5 - 36.5 g/dL    RDW 12.0 10.0 - 15.0 %    Platelet Count 220 150 - 450 10e3/uL      EKG: appears normal, NSR, 1st degree AV block , normal axis, normal intervals, no acute ST/T changes c/w ischemia, no LVH by voltage criteria, unchanged from previous tracings    Revised Cardiac Risk Index (RCRI):  The patient has the following serious cardiovascular risks for perioperative complications:   - High risk surgery (>5% cardiac complication risk) = 1 point     RCRI Interpretation: 0 points: Class I (very low risk - 0.4% complication rate)           Signed Electronically by: CHELSEY Martin CNP  Copy of this evaluation report is provided to requesting physician.        Answers for HPI/ROS submitted by the patient on 7/5/2022  If you checked off any problems, how difficult have these problems made it for you to do your work, take care of things at home, or get along with other people?: Not difficult at all  PHQ9 TOTAL SCORE: 2  SHANTELLE 7 TOTAL SCORE:  10

## 2022-07-05 NOTE — LETTER
July 7, 2022      Pancho Zamanryan Esquivel  [address]  [place] [address]        Dear ,    We are writing to inform you of your test results.  hemoglobin A1c is increased to 8.4 recommend follow-up with your primary care provider post surgery.  Please schedule appointment with your primary to review your current hemoglobin A1c.         Resulted Orders   Hemoglobin A1c   Result Value Ref Range    Hemoglobin A1C 8.2 (H) 0.0 - 5.6 %   Basic metabolic panel  (Ca, Cl, CO2, Creat, Gluc, K, Na, BUN)   Result Value Ref Range    Sodium 133 133 - 144 mmol/L    Potassium 3.9 3.4 - 5.3 mmol/L    Chloride 101 94 - 109 mmol/L    Carbon Dioxide (CO2) 27 20 - 32 mmol/L    Anion Gap 5 3 - 14 mmol/L    Urea Nitrogen 26 7 - 30 mg/dL    Creatinine 0.97 0.66 - 1.25 mg/dL    Calcium 9.5 8.5 - 10.1 mg/dL    Glucose 191 (H) 70 - 99 mg/dL    GFR Estimate 81 >60 mL/min/1.73m2      Comment:      Effective December 21, 2021 eGFRcr in adults is calculated using the 2021 CKD-EPI creatinine equation which includes age and gender (Erik et al., NEJ, DOI: 10.1056/DJJVbz0570426)   CBC with platelets   Result Value Ref Range    WBC Count 11.6 (H) 4.0 - 11.0 10e3/uL    RBC Count 4.52 4.40 - 5.90 10e6/uL    Hemoglobin 14.3 13.3 - 17.7 g/dL    Hematocrit 39.8 (L) 40.0 - 53.0 %    MCV 88 78 - 100 fL    MCH 31.6 26.5 - 33.0 pg    MCHC 35.9 31.5 - 36.5 g/dL    RDW 12.0 10.0 - 15.0 %    Platelet Count 220 150 - 450 10e3/uL       If you have any questions or concerns, please call the clinic at the number listed above.       Sincerely,      Sarah Martinez, CHELSEY CNP/dw

## 2022-07-05 NOTE — PATIENT INSTRUCTIONS
Hold Glimepiride day of surgery   Preparing for Your Surgery  Getting started  A nurse will call you to review your health history and instructions. They will give you an arrival time based on your scheduled surgery time. Please be ready to share:  Your doctor's clinic name and phone number  Your medical, surgical and anesthesia history  A list of allergies and sensitivities  A list of medicines, including herbal treatments and over-the-counter drugs  Whether the patient has a legal guardian (ask how to send us the papers in advance)  Please tell us if you're pregnant--or if there's any chance you might be pregnant. Some surgeries may injure a fetus (unborn baby), so they require a pregnancy test. Surgeries that are safe for a fetus don't always need a test, and you can choose whether to have one.   If you have a child who's having surgery, please ask for a copy of Preparing for Your Child's Surgery.    Preparing for surgery  Within 30 days of surgery: Have a pre-op exam (sometimes called an H&P, or History and Physical). This can be done at a clinic or pre-operative center.  If you're having a , you may not need this exam. Talk to your care team.  At your pre-op exam, talk to your care team about all medicines you take. If you need to stop any medicines before surgery, ask when to start taking them again.  We do this for your safety. Many medicines can make you bleed too much during surgery. Some change how well surgery (anesthesia) drugs work.  Call your insurance company to let them know you're having surgery. (If you don't have insurance, call [phone].)  Call your clinic if there's any change in your health. This includes signs of a cold or flu (sore throat, runny nose, cough, rash, fever). It also includes a scrape or scratch near the surgery site.  If you have questions on the day of surgery, call your hospital or surgery center.  COVID testing  You may need to be tested for COVID-19 before  having surgery. If so, we will give you instructions.  Eating and drinking guidelines  For your safety: Unless your surgeon tells you otherwise, follow the guidelines below.  Eat and drink as usual until 8 hours before surgery. After that, no food or milk.  Drink clear liquids until 2 hours before surgery. These are liquids you can see through, like water, Gatorade and Propel Water. You may also have black coffee and tea (no cream or milk).  Nothing by mouth within 2 hours of surgery. This includes gum, candy and breath mints.  If you drink alcohol: Stop drinking it the night before surgery.  If your care team tells you to take medicine on the morning of surgery, it's okay to take it with a sip of water.  Preventing infection  Shower or bathe the night before and morning of your surgery. Follow the instructions your clinic gave you. (If no instructions, use regular soap.)  Don't shave or clip hair near your surgery site. We'll remove the hair if needed.  Don't smoke or vape the morning of surgery. You may chew nicotine gum up to 2 hours before surgery. A nicotine patch is okay.  Note: Some surgeries require you to completely quit smoking and nicotine. Check with your surgeon.  Your care team will make every effort to keep you safe from infection. We will:  Clean our hands often with soap and water (or an alcohol-based hand rub).  Clean the skin at your surgery site with a special soap that kills germs.  Give you a special gown to keep you warm. (Cold raises the risk of infection.)  Wear special hair covers, masks, gowns and gloves during surgery.  Give antibiotic medicine, if prescribed. Not all surgeries need antibiotics.  What to bring on the day of surgery  Photo ID and insurance card  Copy of your health care directive, if you have one  Glasses and hearing aides (bring cases)  You can't wear contacts during surgery  Inhaler and eye drops, if you use them (tell us about these when you arrive)  CPAP machine or  breathing device, if you use them  A few personal items, if spending the night  If you have . . .  A pacemaker, ICD (cardiac defibrillator) or other implant: Bring the ID card.  An implanted stimulator: Bring the remote control.  A legal guardian: Bring a copy of the certified (court-stamped) guardianship papers.  Please remove any jewelry, including body piercings. Leave jewelry and other valuables at home.  If you're going home the day of surgery  You must have a responsible adult drive you home. They should stay with you overnight as well.  If you don't have someone to stay with you, and you aren't safe to go home alone, we may keep you overnight. Insurance often won't pay for this.  After surgery  If it's hard to control your pain or you need more pain medicine, please call your surgeon's office.  Questions?   If you have any questions for your care team, list them here: _________________________________________________________________________________________________________________________________________________________________________ ____________________________________ ____________________________________ ____________________________________  For informational purposes only. Not to replace the advice of your health care provider. Copyright   2003, 2019 [facility]. All rights reserved. Clinically reviewed by Jannet Fang MD. [employer] ([place]) [id number] - REV 07/21.

## 2022-07-07 NOTE — RESULT ENCOUNTER NOTE
Please Notify Pancho  of test results hemoglobin A1c is increased to 8.4 recommend follow-up with your primary care provider post surgery.  Please schedule appointment for patient with his primary to review his current hemoglobin A1c.    Sarah Martinez CNP

## 2022-08-05 ENCOUNTER — TELEPHONE (OUTPATIENT)
Dept: FAMILY MEDICINE | Facility: CLINIC | Age: 77
End: 2022-08-05

## 2022-08-05 ENCOUNTER — TRANSFERRED RECORDS (OUTPATIENT)
Dept: HEALTH INFORMATION MANAGEMENT | Facility: CLINIC | Age: 77
End: 2022-08-05

## 2022-08-05 NOTE — TELEPHONE ENCOUNTER
Pt stated when he went in for his surgery. Pt stated he wanted his PCP to be Dr. Thompson, and he had an apt set for 7/20. Pt ended up having emergency surgery, and had to cancel. Pt would like to get established with a primary doctor and wants to know if Dr. Thompson would be his PCP. Pt wondering if he can get a visit sooner than 9/7/22. Pt has not followed up with a PCP since surgery.     Mago Peraza RN

## 2022-08-09 ENCOUNTER — TELEPHONE (OUTPATIENT)
Dept: FAMILY MEDICINE | Facility: CLINIC | Age: 77
End: 2022-08-09

## 2022-08-09 NOTE — TELEPHONE ENCOUNTER
Pt would like to establish care with Dr Thompson and would like to get his blood sugar under control.  He just had spine surgery for a herniated disc and he says the surgeon wants to re do the surgery. Pt is refusing repeat spine  surgery but knows there will be ongoing issues with his spine.    Appt was made for next week with Dr Thompson

## 2022-08-09 NOTE — TELEPHONE ENCOUNTER
Left message for the patient to call the clinic.  Is there a reason he needs to be seen sooner? Elyssa BARRETT RN

## 2022-08-23 ENCOUNTER — OFFICE VISIT (OUTPATIENT)
Dept: FAMILY MEDICINE | Facility: CLINIC | Age: 77
End: 2022-08-23
Payer: COMMERCIAL

## 2022-08-23 VITALS
OXYGEN SATURATION: 97 % | BODY MASS INDEX: 30.96 KG/M2 | WEIGHT: 209 LBS | TEMPERATURE: 97.3 F | RESPIRATION RATE: 16 BRPM | HEIGHT: 69 IN | HEART RATE: 84 BPM | SYSTOLIC BLOOD PRESSURE: 120 MMHG | DIASTOLIC BLOOD PRESSURE: 70 MMHG

## 2022-08-23 DIAGNOSIS — M54.42 CHRONIC LEFT-SIDED LOW BACK PAIN WITH LEFT-SIDED SCIATICA: ICD-10-CM

## 2022-08-23 DIAGNOSIS — E78.2 MIXED HYPERLIPIDEMIA: ICD-10-CM

## 2022-08-23 DIAGNOSIS — G89.29 CHRONIC LEFT-SIDED LOW BACK PAIN WITH LEFT-SIDED SCIATICA: ICD-10-CM

## 2022-08-23 DIAGNOSIS — N18.31 STAGE 3A CHRONIC KIDNEY DISEASE (H): ICD-10-CM

## 2022-08-23 DIAGNOSIS — E11.42 TYPE 2 DIABETES MELLITUS WITH DIABETIC POLYNEUROPATHY, WITHOUT LONG-TERM CURRENT USE OF INSULIN (H): Primary | ICD-10-CM

## 2022-08-23 LAB
CHOLEST SERPL-MCNC: 128 MG/DL
CREAT UR-MCNC: 54 MG/DL
FASTING STATUS PATIENT QL REPORTED: NO
HDLC SERPL-MCNC: 44 MG/DL
LDLC SERPL CALC-MCNC: 59 MG/DL
MICROALBUMIN UR-MCNC: <5 MG/L
MICROALBUMIN/CREAT UR: NORMAL MG/G{CREAT}
NONHDLC SERPL-MCNC: 84 MG/DL
TRIGL SERPL-MCNC: 123 MG/DL

## 2022-08-23 PROCEDURE — 80061 LIPID PANEL: CPT | Performed by: FAMILY MEDICINE

## 2022-08-23 PROCEDURE — 36415 COLL VENOUS BLD VENIPUNCTURE: CPT | Performed by: FAMILY MEDICINE

## 2022-08-23 PROCEDURE — 99214 OFFICE O/P EST MOD 30 MIN: CPT | Performed by: FAMILY MEDICINE

## 2022-08-23 PROCEDURE — 82043 UR ALBUMIN QUANTITATIVE: CPT | Performed by: FAMILY MEDICINE

## 2022-08-23 RX ORDER — METHYLPREDNISOLONE 4 MG
TABLET, DOSE PACK ORAL
Qty: 21 TABLET | Refills: 0 | Status: SHIPPED | OUTPATIENT
Start: 2022-08-23 | End: 2022-09-26

## 2022-08-23 ASSESSMENT — PAIN SCALES - GENERAL: PAINLEVEL: MILD PAIN (3)

## 2022-08-23 NOTE — PATIENT INSTRUCTIONS
Start on medrol dose pack.     Follow up with Spinal surgeon regarding back pain and numbness.     Start on Jardiance 10 mg daily.     Follow up in 1 month for re-evaluation.     Lab work today. Blood and urine.

## 2022-08-23 NOTE — PROGRESS NOTES
Assessment & Plan     Type 2 diabetes mellitus with diabetic polyneuropathy, without long-term current use of insulin (H)  Stage 3a chronic kidney disease (H)  Mixed hyperlipidemia  Current regimen  Glimepiride 4 mg twice daily  Atorvastatin 10 mg daily  Last A1c of 8.2 on 7/5/2022  Blood sugars elevated in the 160-200 range fasting in the morning.   No evidence of microalbuminuria. Did not tolerate lisinopril in the past due to cough  -- Start on jardiance 10 mg daily.  -- follow up with A1c in 2 months.   -- discussed sugars will be slightly elevated in the setting of using medrol dose pack for symptoms below.   - empagliflozin (JARDIANCE) 10 MG TABS tablet  Dispense: 90 tablet; Refill: 1  - Albumin Random Urine Quantitative with Creat Ratio  - Lipid panel reflex to direct LDL Non-fasting      Chronic left-sided low back pain with left-sided sciatica  Reports recent surgery. Symptoms have worsened since then. Advised to follow up with surgeon for further discussions and cares. Unfortunately due not have any of these records available. Patient reports MRI s/p procedure showed some nerve impingement. Will proceed with medrol dose pack. Reasons to present to ED discussed.   - methylPREDNISolone (MEDROL DOSEPAK) 4 MG tablet therapy pack  Dispense: 21 tablet; Refill: 0    The risks, benefits and treatment options of prescribed medications or other treatments have been discussed with the patient. The patient verbalized their understanding and should call or follow up if no improvement or if they develop further problems.    Follow up in 1 month or sooner if needed.     Jorge Alberto Thompson [facility]    Roberto Deleon is a 77 year old accompanied by his spouse, presenting for the following health issues:  Establish Care and Diabetes      HPI   77 year old male who presents to clinic to establish care.     Recent spinal surgery on 7/6/2022  By Dr. Johnston.   He reports the surgeon wanted to  proceed with a repeat surgery, but patient was not interested at that time.   Now with numbness in the left upper leg and lower left lateral leg  Also with numbness on the right upper leg.   Continues to have pain in the lower back.   He reports having a follow up MRI and states it showed some nerve compression issues.     He was having issues with discomfort across the front of his abdomen which have been improving.   No bowel or bladder incontinence.        Diabetes mellitus type 2   Glimepiride 4 mg twice daily  Atorvastatin 10 mg daily  Last A1c of 8.2 on 7/5/2022  Blood sugars elevated in the 160-200 range fasting in the morning.   No evidence of microalbuminuria. Did not tolerate lisinopril in the past due to cough  Has recently been on steroids and glucose has elevated due to this.   Less active since recent spine surgery.     Diabetes Follow-up    How often are you checking your blood sugar? One time daily  What time of day are you checking your blood sugars (select all that apply)?  Before meals  Have you had any blood sugars above 200?  Yes   Have you had any blood sugars below 70?  No    What symptoms do you notice when your blood sugar is low?  Not applicable    What concerns do you have today about your diabetes? Blood sugar is often over 200 and Other: thinks he should be checking his BS twice a day.     Do you have any of these symptoms? (Select all that apply)  Numbness in feet, Blurry vision and Weight loss    Have you had a diabetic eye exam in the last 12 months? Yes- Date of last eye exam: 5/2022,  [facility]        BP Readings from Last 2 Encounters:   08/23/22 120/70   07/05/22 122/84     Hemoglobin A1C (%)   Date Value   07/05/2022 8.2 (H)               Hyperlipidemia Follow-Up      Are you regularly taking any medication or supplement to lower your cholesterol?   Yes- Lipitor    Are you having muscle aches or other side effects that you think could be  "caused by your cholesterol lowering medication?  No      How many servings of fruits and vegetables do you eat daily?  2-3    On average, how many sweetened beverages do you drink each day (Examples: soda, juice, sweet tea, etc.  Do NOT count diet or artificially sweetened beverages)?   0    How many days per week do you exercise enough to make your heart beat faster? 3 or less    How many minutes a day do you exercise enough to make your heart beat faster? 9 or less    How many days per week do you miss taking your medication? 0    Flank pain   Onset/Duration: since surgery 7/6/2022  Description:   Location: left side  Character: sharp  Radiation: back to front.   Duration: 10-20 minutes and intermittent   Intensity: moderate  Progression of Symptoms: same and intermittent  Accompanying Signs & Symptoms:  Shortness of breath: No  Sweating: No  Nausea/vomiting: YES- nausea   Lightheadedness: YES  Palpitations: No  Fever/Chills: No  Cough: YES           Heartburn: YES  History:   Family history of heart disease: YES- mother and father  Tobacco use: No  Previous similar symptoms: YES  Precipitating factors:   Worse with exertion: No  Worse with deep breaths: No           Related to eating: No           Better with burping: No  Alleviating factors: has asbestos in lungs, has sleep apnea machine that has been recalled  Therapies tried and outcome: none    Pain in the middle left thoracic back over his left paraspinals, with palpation causes pain and reports similar pain he has been experiencing.   No prior cardiac history.  He feels like its a pinched nerve in his back causing this or his muscles.       Review of Systems   Constitutional, HEENT, cardiovascular, pulmonary, gi and gu systems are negative, except as otherwise noted.      Objective    /70 (BP Location: Left arm, Patient Position: Chair, Cuff Size: Adult Regular)   Pulse 84   Temp 97.3  F (36.3  C) (Tympanic)   Resp 16   Ht 1.74 m (5' 8.5\")   Wt " 94.8 kg (209 lb)   SpO2 97%   BMI 31.32 kg/m    Body mass index is 31.32 kg/m .  Physical Exam   General: alert, cooperative, no acute distress   CV: RRR, no murmur  Resp: non-labored breathing, clear to auscultation, no wheezing or rales   Abdomen: Soft, non-tender, no guarding.   MSK back: Nontender over the thoracic spine.  Tenderness over the left paraspinal musculature.  The rest of his back exam was quite limited due to discomfort with ambulation and motion.  Lower extremity strength intact.  Sensation intact though decreased bilaterally per patient report.    .  ..

## 2022-08-23 NOTE — LETTER
August 23, 2022      Pancho ABEL Najera Jr.  [address]  [facility] [address]        Dear ,    We are writing to inform you of your test results.    Cholesterol satisfactory.   Urine studies satisfactory.     Resulted Orders   Albumin Random Urine Quantitative with Creat Ratio   Result Value Ref Range    Creatinine Urine mg/dL 54 mg/dL    Albumin Urine mg/L <5 mg/L    Albumin Urine mg/g Cr        Comment:      Unable to calculate, urine albumin or creatinine is outside the detectable limits.   Lipid panel reflex to direct LDL Non-fasting   Result Value Ref Range    Cholesterol 128 <200 mg/dL    Triglycerides 123 <150 mg/dL    Direct Measure HDL 44 >=40 mg/dL    LDL Cholesterol Calculated 59 <=100 mg/dL    Non HDL Cholesterol 84 <130 mg/dL    Patient Fasting > 8hrs? No     Narrative    Cholesterol  Desirable:  <200 mg/dL    Triglycerides  Normal:  Less than 150 mg/dL  Borderline High:  150-199 mg/dL  High:  200-499 mg/dL  Very High:  Greater than or equal to 500 mg/dL    Direct Measure HDL  Female:  Greater than or equal to 50 mg/dL   Male:  Greater than or equal to 40 mg/dL    LDL Cholesterol  Desirable:  <100mg/dL  Above Desirable:  100-129 mg/dL   Borderline High:  130-159 mg/dL   High:  160-189 mg/dL   Very High:  >= 190 mg/dL    Non HDL Cholesterol  Desirable:  130 mg/dL  Above Desirable:  130-159 mg/dL  Borderline High:  160-189 mg/dL  High:  190-219 mg/dL  Very High:  Greater than or equal to 220 mg/dL       If you have any questions or concerns, please call the clinic at the number listed above.       Sincerely,      Jorge Alberto Thompson, DO

## 2022-08-25 ENCOUNTER — TELEPHONE (OUTPATIENT)
Dept: FAMILY MEDICINE | Facility: CLINIC | Age: 77
End: 2022-08-25

## 2022-08-25 DIAGNOSIS — E11.42 TYPE 2 DIABETES MELLITUS WITH DIABETIC POLYNEUROPATHY, WITHOUT LONG-TERM CURRENT USE OF INSULIN (H): Primary | ICD-10-CM

## 2022-08-25 RX ORDER — PIOGLITAZONEHYDROCHLORIDE 15 MG/1
15 TABLET ORAL DAILY
Qty: 90 TABLET | Refills: 3 | Status: SHIPPED | OUTPATIENT
Start: 2022-08-25 | End: 2023-03-15

## 2022-08-25 NOTE — TELEPHONE ENCOUNTER
For diabetes will prescribe actos as typically more cost effective. Take 15 mg daily.     Will place referral for diabetic education.     Needs to follow up with surgery regarding his back pain for options of surgery, vs other options.

## 2022-08-25 NOTE — TELEPHONE ENCOUNTER
"Pt is requesting alterative medication to help lower his blood glucose.     Pt came to  Medrol Dosepak and Jardiance at the pharmacy. Refused Jariance d/t cost ($706 x 3 month supply, $397 x 1 month).     For blood glucose, pt states his levels were previously controlled with glimepiride and regular exercise. However, after his recent surgery, he experiences daily pain, struggles to walk and his lower extremities are numb. Another surgery was to be scheduled to correct the issue, but Pt does not want it at this time. Pt states he has a sensitive stomach and failed metformin d/t his sensitivity. He is open to trying insulin, but has a concern for cost. States he feels \"rattled\" and does not know what to do regarding his pain control and glucose levels.     For pain, pt currently uses a muscle relaxer and states it works well. He has not taken any of the opioid prescriptions that were prescribed to him and has failed gabapentin treatment d/t a rash / hives all over his body.     Pt may benefit from meeting with a diabetic educator. Referred Pt to provider to discuss alternative therapies to manage blood glucose.     Thank you,    Graciela Buck, PharmD  [facility] [address]  Phone: [phone]  [email or web address]  "

## 2022-08-26 ENCOUNTER — TELEPHONE (OUTPATIENT)
Dept: FAMILY MEDICINE | Facility: CLINIC | Age: 77
End: 2022-08-26

## 2022-08-26 NOTE — TELEPHONE ENCOUNTER
Patient to keep current appt to further discuss. IF glucometer is working can consider new device at follow up visit.     I still recommend he see diabetic education.     For further questions or medication changes will need to be addressed at follow up visit on 9/26.

## 2022-08-26 NOTE — TELEPHONE ENCOUNTER
Diabetes Education Scheduling Outreach #1:    Call to patient to schedule. Patient will call back to schedule.    Ledy Acevedo OnCall  Diabetes and Nutrition Scheduling

## 2022-08-26 NOTE — TELEPHONE ENCOUNTER
Writer contacted patient. He says that he's not sure that he wants to change diabetic medications at this time. Says that he feels like his blood sugar was elevated because he had been on the prednisone; says that he's not currently taking prednisone, but was planning to start a Medrol dose pack on Monday 8/29/22. Says that he feels like glimepiride 4mg twice a day has always worked well for him in the past. Says that his BG last night was 114, and was 175 this morning. Patient does express that he likes to check his BG twice a day, typically in the morning and evening. Does report that he has checked it shortly after eating on occasion; writer advises patient that it's best to wait for 2 hours after eating before checking his BG, unless symptomatic. Writer also advised that he write down his BG results, to bring to future visits with PCP. He verbalized understanding. Patient also says that he may need a new glucometer, says that he's had his current monitor for a couple of years. Patient says that he's seen diabetic education before, and feels like he knows what he needs to watch for. Routing encounter to PCP for review; ok for patient to continue on glimepiride? Ok for new glucometer and supplies? Patient does have an appointment scheduled with PCP on 9/26/22.    Marie Chau RN  [facility]

## 2022-09-26 ENCOUNTER — OFFICE VISIT (OUTPATIENT)
Dept: FAMILY MEDICINE | Facility: CLINIC | Age: 77
End: 2022-09-26
Payer: COMMERCIAL

## 2022-09-26 ENCOUNTER — TELEPHONE (OUTPATIENT)
Dept: FAMILY MEDICINE | Facility: CLINIC | Age: 77
End: 2022-09-26

## 2022-09-26 VITALS
OXYGEN SATURATION: 98 % | HEART RATE: 71 BPM | HEIGHT: 69 IN | BODY MASS INDEX: 31.1 KG/M2 | WEIGHT: 210 LBS | RESPIRATION RATE: 16 BRPM | SYSTOLIC BLOOD PRESSURE: 122 MMHG | DIASTOLIC BLOOD PRESSURE: 80 MMHG | TEMPERATURE: 97.3 F

## 2022-09-26 DIAGNOSIS — M54.42 LEFT-SIDED LOW BACK PAIN WITH LEFT-SIDED SCIATICA, UNSPECIFIED CHRONICITY: ICD-10-CM

## 2022-09-26 DIAGNOSIS — Z12.11 SCREEN FOR COLON CANCER: ICD-10-CM

## 2022-09-26 DIAGNOSIS — E11.42 TYPE 2 DIABETES MELLITUS WITH DIABETIC POLYNEUROPATHY, WITHOUT LONG-TERM CURRENT USE OF INSULIN (H): Primary | ICD-10-CM

## 2022-09-26 DIAGNOSIS — E11.42 TYPE 2 DIABETES MELLITUS WITH DIABETIC POLYNEUROPATHY, WITHOUT LONG-TERM CURRENT USE OF INSULIN (H): ICD-10-CM

## 2022-09-26 DIAGNOSIS — N18.31 STAGE 3A CHRONIC KIDNEY DISEASE (H): ICD-10-CM

## 2022-09-26 PROCEDURE — 99215 OFFICE O/P EST HI 40 MIN: CPT | Performed by: FAMILY MEDICINE

## 2022-09-26 RX ORDER — GLIMEPIRIDE 4 MG/1
4 TABLET ORAL
Qty: 180 TABLET | Refills: 3 | Status: SHIPPED | OUTPATIENT
Start: 2022-09-26 | End: 2022-10-03

## 2022-09-26 RX ORDER — BLOOD SUGAR DIAGNOSTIC
1 STRIP MISCELLANEOUS 2 TIMES DAILY
Qty: 200 STRIP | Refills: 3 | Status: SHIPPED | OUTPATIENT
Start: 2022-09-26 | End: 2024-03-14

## 2022-09-26 RX ORDER — ATORVASTATIN CALCIUM 10 MG/1
10 TABLET, FILM COATED ORAL DAILY
Qty: 90 TABLET | Refills: 3 | Status: SHIPPED | OUTPATIENT
Start: 2022-09-26 | End: 2023-03-15

## 2022-09-26 RX ORDER — GLIMEPIRIDE 4 MG/1
4 TABLET ORAL
Qty: 180 TABLET | Refills: 3 | Status: CANCELLED | OUTPATIENT
Start: 2022-09-26

## 2022-09-26 ASSESSMENT — PATIENT HEALTH QUESTIONNAIRE - PHQ9
SUM OF ALL RESPONSES TO PHQ QUESTIONS 1-9: 4
10. IF YOU CHECKED OFF ANY PROBLEMS, HOW DIFFICULT HAVE THESE PROBLEMS MADE IT FOR YOU TO DO YOUR WORK, TAKE CARE OF THINGS AT HOME, OR GET ALONG WITH OTHER PEOPLE: SOMEWHAT DIFFICULT
SUM OF ALL RESPONSES TO PHQ QUESTIONS 1-9: 4

## 2022-09-26 ASSESSMENT — PAIN SCALES - GENERAL: PAINLEVEL: MILD PAIN (3)

## 2022-09-26 NOTE — TELEPHONE ENCOUNTER
Prior Authorization required on Glimepiride  Insurance Phone:[phone]  Patient ID:[id number]  Please contact the pharmacy with Prior Auth status (approved/denied).  Thank you, Mona Simeon - Pharmacy [facility]  [phone]

## 2022-09-26 NOTE — PATIENT INSTRUCTIONS
Follow up with Orthopedics for the back discomfort.     Recheck A1c in 2 weeks with a lab only visit.     Follow up in 3 months.       At your visit today, we discussed your risk for falls and preventive options.    Fall Prevention  Falls often occur due to slipping, tripping or losing your balance. Millions of people fall every year and injure themselves. Here are ways to reduce your risk of falling again.   Think about your fall, was there anything that caused your fall that can be fixed, removed, or replaced?  Make your home safe by keeping walkways clear of objects you may trip over, such as electric cords.  Use non-slip pads under rugs. Don't use area rugs or small throw rugs.  Use non-slip mats in bathtubs and showers.  Install handrails and lights on staircases. The handrails should be on both sides of the stairs.  Don't walk in poorly lit areas.  Don't stand on chairs or wobbly ladders.  Use caution when reaching overhead or looking upward. This position can cause a loss of balance.  Be sure your shoes fit properly, have non-slip bottoms and are in good condition.   Wear shoes both inside and out. Don't go barefoot or wear slippers.  Be cautious when going up and down stairs, curbs, and when walking on uneven sidewalks.  If your balance is poor, consider using a cane or walker.  If your fall was related to alcohol use, stop or limit alcohol intake.   If your fall was related to use of sleeping medicines, talk to your healthcare provider about this. You may need to reduce your dosage at bedtime if you awaken during the night to go to the bathroom.    To reduce the need for nighttime bathroom trips:  Don't drink fluids for several hours before going to bed  Empty your bladder before going to bed  Men can keep a urinal at the bedside  Stay as active as you can. Balance, flexibility, strength, and endurance all come from exercise. They all play a role in preventing falls. Ask your healthcare provider which types  of activity are right for you.  Get your vision checked on a regular basis.  If you have pets, know where they are before you stand up or walk so you don't trip over them.  Use night lights.  Go over all your medicines with a pharmacist or other healthcare provider to see if any of them could make you more likely to fall.  Cally last reviewed this educational content on 4/1/2018 2000-2021 [employer]. All rights reserved. This information is not intended as a substitute for professional medical care. Always follow your healthcare professional's instructions.

## 2022-09-26 NOTE — PROGRESS NOTES
Assessment & Plan     Type 2 diabetes mellitus with diabetic polyneuropathy, without long-term current use of insulin (H)  Glimepiride 4 mg twice daily   pioglitazone 15 mg daily.   Atorvastatin 10 mg daily.   Last A1c of 8.2 on 7/5/2022  -- Check A1c in two weeks. Sugars elevated while on prednisone as expected.   -- Plan to follow up with diabetic visit in 3 months.   - HEMOGLOBIN A1C  - blood glucose (FREESTYLE TEST STRIPS) test strip  Dispense: 200 strip; Refill: 3  - atorvastatin (LIPITOR) 10 MG tablet  Dispense: 90 tablet; Refill: 3  - glimepiride (AMARYL) 4 MG tablet  Dispense: 180 tablet; Refill: 3    Stage 3a chronic kidney disease (H)  Stable, continue to monitor.     Left-sided low back pain with left-sided sciatica, unspecified chronicity  Had a L2-L3 decompression and left discectomy on 7/6 by Dr. Johnston. Spent 6 days in the hospital after this. Continues to have weakness in the left leg and decreased sensation and numbness from the left buttock, and entire left leg. Strength is decreased on the left compared to the right.   -- Reviewed recent MRI Lumbar spine on 7/8/2022. Discussed repeating this MRI study. He is concerned about cost and if insurance will cover this and would prefer to see Orthopedics first.   -- Referral to Orthopedics for ongoing cares.  - Orthopedic  Referral      The risks, benefits and treatment options of prescribed medications or other treatments have been discussed with the patient. The patient verbalized their understanding and should call or follow up if no improvement or if they develop further problems.    Follow up with Orthopedics.   Follow up for diabetic visit in 3 months. A1c check in 2 weeks.     >40 minutes spent on the date of the encounter doing chart review, history and exam, documentation and further activities as noted above      Jorge Alberto Thompson DO  [facility]    Roberto Deleon is a 77 year old accompanied by his spouse,  presenting for the following health issues:  Diabetes and Lipids      History of Present Illness       Diabetes:   He presents for follow up of diabetes.  He is checking home blood glucose two times daily. He checks blood glucose before meals.  Blood glucose is never over 200 and never under 70. When his blood glucose is low, the patient is asymptomatic for confusion, blurred vision, lethargy and reports not feeling dizzy, shaky, or weak.  He has no concerns regarding his diabetes at this time.  He is having numbness in feet.         He eats 0-1 servings of fruits and vegetables daily.He consumes 0 sweetened beverage(s) daily.He exercises with enough effort to increase his heart rate 9 or less minutes per day.  He exercises with enough effort to increase his heart rate 3 or less days per week.   He is taking medications regularly.    Today's PHQ-9         PHQ-9 Total Score: 4    PHQ-9 Q9 Thoughts of better off dead/self-harm past 2 weeks :   Not at all    How difficult have these problems made it for you to do your work, take care of things at home, or get along with other people: Somewhat difficult       Diabetes Follow-up    Glimepiride 4 mg twice daily   pioglitazone 15 mg daily.   Atorvastatin 10 mg daily.   Last A1c of 8.2 on 7/5/2022  Has been checking blood sugars. They were higher when on prednisone as expected.   Fasting blood sugars in the 140-160 range over the past week.     Low back discomfort.   Patient with low back L2-L3 decompression and left discectomy on 7/6 by Dr. Johnston.   Continues to have low back discomfort. Has numbness down the left leg.   Reports numbness on the entire left leg. From the hip, buttock down the leg to the foot.   Strength remains decreased on the left side.   Reports some numbness on the anterior right thigh and anterior lower leg.   No loss of bowel or bladder function.     Per patient report he was told by prior surgeon he may need to repeat the surgery after it was  "completed. He was hesitant to proceed with this. Reviewed recent MRI results. Discussed obtaining another MRI for further evaluation. He is concerned about insurance paying for the MRI and elected to hold off until able to be seen by Orthopedics.       BP Readings from Last 2 Encounters:   09/26/22 122/80   08/23/22 120/70     Hemoglobin A1C (%)   Date Value   07/05/2022 8.2 (H)     LDL Cholesterol Calculated (mg/dL)   Date Value   08/23/2022 59                 Review of Systems   Constitutional, HEENT, cardiovascular, pulmonary, gi and gu systems are negative, except as otherwise noted.      Objective    /80 (BP Location: Right arm, Patient Position: Chair, Cuff Size: Adult Regular)   Pulse 71   Temp 97.3  F (36.3  C) (Tympanic)   Resp 16   Ht 1.74 m (5' 8.5\")   Wt 95.3 kg (210 lb)   SpO2 98%   BMI 31.47 kg/m    Body mass index is 31.47 kg/m .  Physical Exam   General: alert, cooperative, no acute distress   CV: RRR, no murmur  Resp: non-labored breathing, clear to auscultation, no wheezing or rales   Abdomen: Soft, non-tender, no guarding.   MSK back: non-tender over the thoracic and lumbar spine. Prior surgical scar healed, clean, dry. Mild discomfort over the lower left paraspinals/ SI joint. Strength 4/5 on left with hip flexion and knee extension. Sensation to light touch decreased in the left lower leg and right lower leg. Distal pulses strong.           "

## 2022-09-27 NOTE — TELEPHONE ENCOUNTER
PRIOR AUTHORIZATION DENIED    Medication: glimepiride (AMARYL) 4 MG tablet - PA DENIED    Denial Date: 9/27/2022    Denial Rational: MUST TRY/FAIL GLIPIZIDE OR HAVE A DOCUMENTED CONTRAINDICATION OR INTOLERANCE      Appeal Information: IF PROVIDER WOULD LIKE TO APPEAL THIS DECISION PLEASE PROVIDE PA TEAM WITH LETTER OF MEDICAL NECESSITY

## 2022-09-27 NOTE — TELEPHONE ENCOUNTER
Central Prior Authorization Team   Phone: [phone]      PA Initiation    Medication: glimepiride (AMARYL) 4 MG tablet - INITIATED  Insurance Company: [employer] ([facility]) - Phone [phone] Fax [phone]  Pharmacy Filling the Rx: [facility] [place], [place] - [address]  Filling Pharmacy Phone: [phone]  Filling Pharmacy Fax:    Start Date: 9/27/2022

## 2022-09-29 NOTE — TELEPHONE ENCOUNTER
Dr. Thompson  PA for amaryl has been denied.       Denial Rational: MUST TRY/FAIL GLIPIZIDE OR HAVE A DOCUMENTED CONTRAINDICATION OR INTOLERANCE    Grazyna Nicholson

## 2022-10-03 RX ORDER — GLIPIZIDE 5 MG/1
5 TABLET ORAL
Qty: 180 TABLET | Refills: 3 | Status: SHIPPED | OUTPATIENT
Start: 2022-10-03 | End: 2023-03-15

## 2022-10-03 NOTE — TELEPHONE ENCOUNTER
Stop the glimepiride medication.     Start on Glipizide 5 mg twice daily. Script sent to pharmacy.     Spoke with patient wife on the phone. Discussed glimepiride is not covered and PA declined. She voiced understanding of the plan.     Will plan to start on glipizide 5 mg daily.

## 2022-10-10 ENCOUNTER — LAB (OUTPATIENT)
Dept: LAB | Facility: CLINIC | Age: 77
End: 2022-10-10
Payer: COMMERCIAL

## 2022-10-10 DIAGNOSIS — E11.42 TYPE 2 DIABETES MELLITUS WITH DIABETIC POLYNEUROPATHY, WITHOUT LONG-TERM CURRENT USE OF INSULIN (H): ICD-10-CM

## 2022-10-10 LAB — HBA1C MFR BLD: 8.1 % (ref 0–5.6)

## 2022-10-10 PROCEDURE — 36415 COLL VENOUS BLD VENIPUNCTURE: CPT

## 2022-10-10 PROCEDURE — 83036 HEMOGLOBIN GLYCOSYLATED A1C: CPT

## 2022-10-13 DIAGNOSIS — M54.50 LOW BACK PAIN: Primary | ICD-10-CM

## 2022-10-13 NOTE — TELEPHONE ENCOUNTER
Action October 13, 2022 2:07 PM MT   Action Taken Sent a request for images from [facility] [phone] and [facility] [phone].  Sent a request to [facility] for records ad imaging [phone].      Action October 17, 2022 8:36 AM MT   Action Taken Records from [facility] received and sent to urgent scan.     DIAGNOSIS: 2nd opinion - Left-sided low back pain with left-sided sciatica    APPOINTMENT DATE: 10/17/2022   NOTES STATUS DETAILS   OFFICE NOTE from referring provider SELF    OFFICE NOTE from other specialist [facility] 08/05/2022, 07/06/2022, 07/01/2022 - Francesco Johnston MD - [facility]    06/27/2022 - Siegler, Nicole PA-C - [facility]   OPERATIVE REPORT Care Everywhere &  [facility] 07/06/2022 - LUMBAR 2-3 DECOMPRESSION LEFT DISCECTOMY @ [facility]/ Dr. Francesco Johnston - [facility]   MEDICATION LIST Care Everywhere    LABS     CBC/DIFF Care Everywhere 07/08/2022   MRI PACS [facility]:  08/03/2022, 06/29/2022 - L Spine  [facility]:  07/08/2022 - L Spine  [facility]:  06/27/2011 - L Spine   CT SCAN PACS [facility]:  11/19/2018, 03/25/2016 - Chest   XRAYS (IMAGES & REPORTS) PACS [facility]:  07/06/2022 - Spine PORT  [facility]:  06/27/2022 - L Spine  [facility]:  05/14/2021 - Thoracolumbar Spine

## 2022-10-17 ENCOUNTER — ANCILLARY PROCEDURE (OUTPATIENT)
Dept: GENERAL RADIOLOGY | Facility: CLINIC | Age: 77
End: 2022-10-17
Attending: ORTHOPAEDIC SURGERY
Payer: COMMERCIAL

## 2022-10-17 ENCOUNTER — OFFICE VISIT (OUTPATIENT)
Dept: ORTHOPEDICS | Facility: CLINIC | Age: 77
End: 2022-10-17
Attending: FAMILY MEDICINE
Payer: COMMERCIAL

## 2022-10-17 ENCOUNTER — PRE VISIT (OUTPATIENT)
Dept: ORTHOPEDICS | Facility: CLINIC | Age: 77
End: 2022-10-17

## 2022-10-17 DIAGNOSIS — G89.29 CHRONIC LEFT-SIDED LOW BACK PAIN WITHOUT SCIATICA: Primary | ICD-10-CM

## 2022-10-17 DIAGNOSIS — M54.42 LEFT-SIDED LOW BACK PAIN WITH LEFT-SIDED SCIATICA, UNSPECIFIED CHRONICITY: ICD-10-CM

## 2022-10-17 DIAGNOSIS — Z98.890 HISTORY OF MICRODISCECTOMY: ICD-10-CM

## 2022-10-17 DIAGNOSIS — M54.50 CHRONIC LEFT-SIDED LOW BACK PAIN WITHOUT SCIATICA: Primary | ICD-10-CM

## 2022-10-17 DIAGNOSIS — R32 URINARY INCONTINENCE, UNSPECIFIED TYPE: ICD-10-CM

## 2022-10-17 DIAGNOSIS — R26.89 BALANCE PROBLEMS: ICD-10-CM

## 2022-10-17 PROCEDURE — 72110 X-RAY EXAM L-2 SPINE 4/>VWS: CPT | Mod: TC | Performed by: RADIOLOGY

## 2022-10-17 PROCEDURE — 99205 OFFICE O/P NEW HI 60 MIN: CPT | Performed by: ORTHOPAEDIC SURGERY

## 2022-10-17 NOTE — LETTER
10/17/2022         RE: Pancho Najera Jr.  [address]  [place] [address]        Dear Colleague,    Thank you for referring your patient, Pancho Najera Jr., to the [facility] [place]. Please see a copy of my visit note below.    Spine Surgery Consultation    REFERRING PHYSICIAN: Jorge Alberto Thompson   PRIMARY CARE PHYSICIAN: Jorge Alberto Thompson           Chief Complaint:   Consult (2nd opinion - Left-sided low back pain with left-sided sciatica, / h/o of surgery with [facility], Dr. Dawit Johnston )      History of Present Illness:  Symptom Profile Including: location of symptoms, onset, severity, exacerbating/alleviating factors, previous treatments:        Pancho Najera Jr. is a 77 year old male who presents today for evaluation of recurrent lumbar radiculopathy. He is s/p L2-3 left sided microdiscectomy with Dr. Johnston at [facility] in July of 2022 (just over 90 days ago which is the surgical global period).  Patient reports that his symptoms were worse as soon as he woke up from surgery. Now has pain radiating into scrotum and groin.  Had uncontrolled pain after surgery for which he did not receive pain medications until he went to the emergency department. He reports balance problems and urinary incontinence. 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 He walks with a walker most of the time but prefers to use a walker when he goes to medical appointments but worries about instability. Feels like his balance definitely changed for the worse after his disc herniation and feels at risk of fall because he doesn't have control of the left leg. He also has numbness medial right leg    He has pain radiating to left inguinal fold and medial thigh as well as anterior thigh. Also has sharp pain that shoots to left hip with certain positions or movementsHe has numbness all along his left leg. Pain radiates to buttock,  hip, inguinal folds, testicle. Severe back pain and back feels like it gets fatigued and he has a hard time standing. Very limited ability to walk any distance; states he cannot walk a city block. He has not had mirlande incontinence but has extreme urinary urgency and significant amount of dribbling after urination. States he has more constipation and has needed miralax or prune juice. Now having BM every other day or so.     Since surgery has had physical therapy which did help. Wears knee sleeves which he feels help his stability. No injections since surgery. Walks with a walker or cane due to severe pain and loss of left leg sensation.                Past Medical History:     Patient Active Problem List   Diagnosis     Anxiety state     Chest pain     Depression     Obstructive sleep apnea syndrome     History of respiratory system disease     Pleural plaque     Multiple pulmonary nodules     Ventricular premature beats     Ventricular bigeminy     Chronic left-sided low back pain without sciatica     Chronic seasonal allergic rhinitis     Stage 3a chronic kidney disease (H)     Mixed hyperlipidemia     Type 2 diabetes mellitus with diabetic polyneuropathy, without long-term current use of insulin (H)     Balance problems     Gastroesophageal reflux disease with esophagitis     Plantar fasciitis, bilateral     Asbestos exposure     Atrophic gastritis     BMI 32.0-32.9,adult     Hearing loss, bilateral     Osteopenia of lumbar spine     Polyneuropathy     Urinary incontinence, unspecified type     Vitamin D deficiency              Past Surgical History:   L2-3 left sided microdiscectomy in July with Dr. Johnston at [facility]. Saw Dr. Johnston at [facility] [address].          Social History:     Social History     Tobacco Use     Smoking status: Never     Smokeless tobacco: Never   Substance Use Topics     Alcohol use: Not Currently            Family History:     Family History   Problem Relation Age of Onset     Diabetes  Mother      Coronary Artery Disease Mother      Uterine Cancer Mother      Lung Cancer Father      Diabetes Father      Asthma Sister      Diabetes Sister             Allergies:     Allergies   Allergen Reactions     Pneumococcal Vaccine Other (See Comments)     swelling  swelling       Sulfa Drugs Hives     Gabapentin Hives     Lisinopril Cough     Metformin      Felt unwell, did not tolerate             Medications:     Current Outpatient Medications   Medication     aspirin (ASA) 81 MG EC tablet     atorvastatin (LIPITOR) 10 MG tablet     blood glucose (FREESTYLE TEST STRIPS) test strip     blood glucose monitoring (SOFTCLIX) lancets     clonazePAM (KLONOPIN) 0.5 MG tablet     glipiZIDE (GLUCOTROL) 5 MG tablet     ibuprofen (ADVIL/MOTRIN) 200 MG capsule     pioglitazone (ACTOS) 15 MG tablet     cycloSPORINE (RESTASIS) 0.05 % ophthalmic emulsion     No current facility-administered medications for this visit.             Review of Systems:     A 10 point ROS was performed and reviewed. Specific responses to these questions are noted at the end of the document.         Physical Exam:     PHYSICAL EXAM:   Constitutional - Patient is healthy, well-nourished and appears stated age, is in no acute distress    Vitals: There were no vitals taken for this visit.   Respiratory - Patient is breathing normally, no audible wheeze or respiratory distress.   Skin - No suspicious rashes or lesions.   Psychiatric - Normal mood and affect.   Cardiovascular - Extremities warm and well perfused.   GI - No abdominal distention.   Musculoskeletal - Has antalgic gait and requires cane to ambulate.                   Lumbar Spine:    Appearance - Stands with very stooped posture. Broad based gait. Can walk on tip toes and heels with considerable difficulty    Palpation - tender to palpation over paraspinal muscles down to PSIS with paraspinal muscle edema and muscle spasm on left side    ROM - range of motion is moderately decreased in  lumbar extension. Very painful with facet loading    Motor -        LOWER EXTREMITY Left Right   Hip flexion 5/5 5/5   Knee flexion 5/5 5/5   Knee extension 5/5 5/5   Ankle dorsiflexion 5/5 5/5   Ankle plantarflexion 5/5 5/5   Great toe extension 5/5 5/5        Special tests -     Straight leg raise - Positive     TRAE - Negative     Pain with hip ROM - Negative     Neurologic - endorses hyperesthesia in left L2/L3 distribution, decreased sensation in left L4 and L5.      REFLEXES Left Right   Patella 1+ 1+   Ankle jerk 1+ 1+   Clonus 1 beats 1 beats                Imaging:   We ordered and independently reviewed new radiographs at this clinic visit. The results were discussed with the patient.  Findings include:    AP, lateral, flexion and extension views of lumbar spine show coronal deformity measuring 24 degrees from superior L4 to inferior L2. There is stable 1 cm retrolisthesis of L2 on L3. I have a hard time making out the superior articular processes of L3 which may be secondary to medial facetectomy.     MRI from 6/29 before microdiscectomy on left and 8/3 a month after surgery on right show essentially unchanged apperaance of the L2-3 herniation with large caudal extrusion.  There is persistent severe stenosis of the neuroforamen and lateral recess which corresponds with the patient's persistent severe radicular symptoms.               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 Johnston at [facility].  Patient is having bowel and bladder dysfunction so I believe proceeding in timely manner is warranted. Patient states he had a very negative experience with Dr. Johnston and is interested in having surgery with me.  I counseled him on the two potential treatment options which are revision discectomy and discectomy and fusion.  I counseled the patient, his wife and their  son that decompression and fusion would be the definitive treatment and allow complete decompression of the lateral recess as well as the far lateral disc fragment but that it is a longer surgery with increased complication risk and longer recovery. They would like to proceed with revision discectomy knowing there is increased risk of incomplete symptom relief or recurrent disc herniation (up to 15% of patients) after surgery. They would like to consider and will call me back when a decision is made.      1. Time spent on this clinical encounter including previsit chart review, history and physical examination, patient counseling and documentation was 80 minutes on the date of encounter.  2.             Respectfully,  Sandoval Damico MD  Spine Surgery  [facility]

## 2022-10-17 NOTE — NURSING NOTE
Reason For Visit:   Chief Complaint   Patient presents with     Consult     2nd opinion - Left-sided low back pain with left-sided sciatica, / h/o of surgery with [facility], Dr. Dawit Johnston        Primary MD: Jorge Alberto Thompson  Ref. MD: Dr. Huntley    ?  No  Occupation retired.    Date of injury: No  Type of injury: No.    Date of surgery: 7/6/22  Type of surgery: LUMBAR 2-3 DECOMPRESSION LEFT DISCECTOMY, Dr. Jhonston    Smoker: No  Request smoking cessation information: No    There were no vitals taken for this visit.    Pain Assessment  Patient Currently in Pain: Yes  0-10 Pain Scale: 4 (on pain medication)  Primary Pain Location: Back    Oswestry (ASHA) Questionnaire    OSWESTRY DISABILITY INDEX 10/17/2022   Count 9   Sum 33   Oswestry Score (%) 73.33   Some recent data might be hidden      Visual Analog Pain Scale  Back Pain Scale 0-10: 4  Right leg pain: 0 (numbness)  Left leg pain: 0 (numbness)  Neck Pain Scale 0-10: 0  Right arm pain: 0  Left arm pain: 0    Promis 10 Assessment    No flowsheet data found.             Vivi Rojas LPN

## 2022-10-17 NOTE — PROGRESS NOTES
Spine Surgery Consultation    REFERRING PHYSICIAN: Jorge Alberto Thompson   PRIMARY CARE PHYSICIAN: Jorge Alberto Thompson           Chief Complaint:   Consult (2nd opinion - Left-sided low back pain with left-sided sciatica, / h/o of surgery with [facility], Dr. Dawit Johnston )      History of Present Illness:  Symptom Profile Including: location of symptoms, onset, severity, exacerbating/alleviating factors, previous treatments:        Pancho Najera Jr. is a 77 year old male who presents today for evaluation of recurrent lumbar radiculopathy. He is s/p L2-3 left sided microdiscectomy with Dr. Johnston at [facility] in July of 2022 (just over 90 days ago which is the surgical global period).  Patient reports that his symptoms were worse as soon as he woke up from surgery. Now has pain radiating into scrotum and groin.  Had uncontrolled pain after surgery for which he did not receive pain medications until he went to the emergency department. He reports balance problems and urinary incontinence. 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 He walks with a walker most of the time but prefers to use a walker when he goes to medical appointments but worries about instability. Feels like his balance definitely changed for the worse after his disc herniation and feels at risk of fall because he doesn't have control of the left leg. He also has numbness medial right leg    He has pain radiating to left inguinal fold and medial thigh as well as anterior thigh. Also has sharp pain that shoots to left hip with certain positions or movementsHe has numbness all along his left leg. Pain radiates to buttock, hip, inguinal folds, testicle. Severe back pain and back feels like it gets fatigued and he has a hard time standing. Very limited ability to walk any distance; states he cannot walk a city block. He has not had mirlande incontinence but has extreme urinary  urgency and significant amount of dribbling after urination. States he has more constipation and has needed miralax or prune juice. Now having BM every other day or so.     Since surgery has had physical therapy which did help. Wears knee sleeves which he feels help his stability. No injections since surgery. Walks with a walker or cane due to severe pain and loss of left leg sensation.                Past Medical History:     Patient Active Problem List   Diagnosis     Anxiety state     Chest pain     Depression     Obstructive sleep apnea syndrome     History of respiratory system disease     Pleural plaque     Multiple pulmonary nodules     Ventricular premature beats     Ventricular bigeminy     Chronic left-sided low back pain without sciatica     Chronic seasonal allergic rhinitis     Stage 3a chronic kidney disease (H)     Mixed hyperlipidemia     Type 2 diabetes mellitus with diabetic polyneuropathy, without long-term current use of insulin (H)     Balance problems     Gastroesophageal reflux disease with esophagitis     Plantar fasciitis, bilateral     Asbestos exposure     Atrophic gastritis     BMI 32.0-32.9,adult     Hearing loss, bilateral     Osteopenia of lumbar spine     Polyneuropathy     Urinary incontinence, unspecified type     Vitamin D deficiency              Past Surgical History:   L2-3 left sided microdiscectomy in July with Dr. Johnston at [facility]. Saw Dr. Johnston at [facility] [address].          Social History:     Social History     Tobacco Use     Smoking status: Never     Smokeless tobacco: Never   Substance Use Topics     Alcohol use: Not Currently            Family History:     Family History   Problem Relation Age of Onset     Diabetes Mother      Coronary Artery Disease Mother      Uterine Cancer Mother      Lung Cancer Father      Diabetes Father      Asthma Sister      Diabetes Sister             Allergies:     Allergies   Allergen Reactions     Pneumococcal Vaccine Other (See  Comments)     swelling  swelling       Sulfa Drugs Hives     Gabapentin Hives     Lisinopril Cough     Metformin      Felt unwell, did not tolerate             Medications:     Current Outpatient Medications   Medication     aspirin (ASA) 81 MG EC tablet     atorvastatin (LIPITOR) 10 MG tablet     blood glucose (FREESTYLE TEST STRIPS) test strip     blood glucose monitoring (SOFTCLIX) lancets     clonazePAM (KLONOPIN) 0.5 MG tablet     glipiZIDE (GLUCOTROL) 5 MG tablet     ibuprofen (ADVIL/MOTRIN) 200 MG capsule     pioglitazone (ACTOS) 15 MG tablet     cycloSPORINE (RESTASIS) 0.05 % ophthalmic emulsion     No current facility-administered medications for this visit.             Review of Systems:     A 10 point ROS was performed and reviewed. Specific responses to these questions are noted at the end of the document.         Physical Exam:     PHYSICAL EXAM:   Constitutional - Patient is healthy, well-nourished and appears stated age, is in no acute distress    Vitals: There were no vitals taken for this visit.   Respiratory - Patient is breathing normally, no audible wheeze or respiratory distress.   Skin - No suspicious rashes or lesions.   Psychiatric - Normal mood and affect.   Cardiovascular - Extremities warm and well perfused.   GI - No abdominal distention.   Musculoskeletal - Has antalgic gait and requires cane to ambulate.                   Lumbar Spine:    Appearance - Stands with very stooped posture. Broad based gait. Can walk on tip toes and heels with considerable difficulty    Palpation - tender to palpation over paraspinal muscles down to PSIS with paraspinal muscle edema and muscle spasm on left side    ROM - range of motion is moderately decreased in lumbar extension. Very painful with facet loading    Motor -        LOWER EXTREMITY Left Right   Hip flexion 5/5 5/5   Knee flexion 5/5 5/5   Knee extension 5/5 5/5   Ankle dorsiflexion 5/5 5/5   Ankle plantarflexion 5/5 5/5   Great toe extension 5/5  5/5        Special tests -     Straight leg raise - Positive     TRAE - Negative     Pain with hip ROM - Negative     Neurologic - endorses hyperesthesia in left L2/L3 distribution, decreased sensation in left L4 and L5.      REFLEXES Left Right   Patella 1+ 1+   Ankle jerk 1+ 1+   Clonus 1 beats 1 beats                Imaging:   We ordered and independently reviewed new radiographs at this clinic visit. The results were discussed with the patient.  Findings include:    AP, lateral, flexion and extension views of lumbar spine show coronal deformity measuring 24 degrees from superior L4 to inferior L2. There is stable 1 cm retrolisthesis of L2 on L3. I have a hard time making out the superior articular processes of L3 which may be secondary to medial facetectomy.     MRI from 6/29 before microdiscectomy on left and 8/3 a month after surgery on right show essentially unchanged apperaance of the L2-3 herniation with large caudal extrusion.  There is persistent severe stenosis of the neuroforamen and lateral recess which corresponds with the patient's persistent severe radicular symptoms.               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 Johnston at [facility].  Patient is having bowel and bladder dysfunction so I believe proceeding in timely manner is warranted. Patient states he had a very negative experience with Dr. Johnston and is interested in having surgery with me.  I counseled him on the two potential treatment options which are revision discectomy and discectomy and fusion.  I counseled the patient, his wife and their son that decompression and fusion would be the definitive treatment and allow complete decompression of the lateral recess as well as the far lateral disc fragment but that it is a longer surgery with increased complication risk and longer recovery.  They would like to proceed with revision discectomy knowing there is increased risk of incomplete symptom relief or recurrent disc herniation (up to 15% of patients) after surgery. They would like to consider and will call me back when a decision is made.      1. Time spent on this clinical encounter including previsit chart review, history and physical examination, patient counseling and documentation was 80 minutes on the date of encounter.  2.             Respectfully,  Sandoval Damico MD  Spine Surgery  [facility]

## 2022-10-18 PROBLEM — Z98.890 HISTORY OF MICRODISCECTOMY: Status: ACTIVE | Noted: 2022-10-18

## 2023-02-14 ENCOUNTER — TELEPHONE (OUTPATIENT)
Dept: FAMILY MEDICINE | Facility: CLINIC | Age: 78
End: 2023-02-14
Payer: COMMERCIAL

## 2023-02-14 DIAGNOSIS — E11.42 TYPE 2 DIABETES MELLITUS WITH DIABETIC POLYNEUROPATHY, WITHOUT LONG-TERM CURRENT USE OF INSULIN (H): Primary | ICD-10-CM

## 2023-02-14 RX ORDER — BLOOD-GLUCOSE METER
EACH MISCELLANEOUS
Qty: 1 KIT | Refills: 0 | Status: SHIPPED | OUTPATIENT
Start: 2023-02-14

## 2023-02-14 NOTE — TELEPHONE ENCOUNTER
Can you please send a new Rx for One touch ultra meter and One touch ultra lancets per patient insurance requirements.     Thank you,  Mona Simeon - Pharmacy Technician  [facility]  [phone]

## 2023-03-15 ENCOUNTER — OFFICE VISIT (OUTPATIENT)
Dept: FAMILY MEDICINE | Facility: CLINIC | Age: 78
End: 2023-03-15
Payer: COMMERCIAL

## 2023-03-15 VITALS
DIASTOLIC BLOOD PRESSURE: 80 MMHG | BODY MASS INDEX: 34.84 KG/M2 | SYSTOLIC BLOOD PRESSURE: 120 MMHG | RESPIRATION RATE: 24 BRPM | OXYGEN SATURATION: 96 % | TEMPERATURE: 97.9 F | HEART RATE: 71 BPM | HEIGHT: 67 IN | WEIGHT: 222 LBS

## 2023-03-15 DIAGNOSIS — E11.42 TYPE 2 DIABETES MELLITUS WITH DIABETIC POLYNEUROPATHY, WITHOUT LONG-TERM CURRENT USE OF INSULIN (H): Primary | ICD-10-CM

## 2023-03-15 DIAGNOSIS — N18.31 STAGE 3A CHRONIC KIDNEY DISEASE (H): ICD-10-CM

## 2023-03-15 DIAGNOSIS — M54.42 LEFT-SIDED LOW BACK PAIN WITH LEFT-SIDED SCIATICA, UNSPECIFIED CHRONICITY: ICD-10-CM

## 2023-03-15 LAB — HBA1C MFR BLD: 7 % (ref 0–5.6)

## 2023-03-15 PROCEDURE — 36415 COLL VENOUS BLD VENIPUNCTURE: CPT | Performed by: FAMILY MEDICINE

## 2023-03-15 PROCEDURE — 99214 OFFICE O/P EST MOD 30 MIN: CPT | Performed by: FAMILY MEDICINE

## 2023-03-15 PROCEDURE — 83036 HEMOGLOBIN GLYCOSYLATED A1C: CPT | Performed by: FAMILY MEDICINE

## 2023-03-15 RX ORDER — GLIMEPIRIDE 4 MG/1
1 TABLET ORAL
COMMUNITY
Start: 2022-12-10 | End: 2023-03-15

## 2023-03-15 RX ORDER — ATORVASTATIN CALCIUM 10 MG/1
10 TABLET, FILM COATED ORAL DAILY
Qty: 90 TABLET | Refills: 3 | Status: SHIPPED | OUTPATIENT
Start: 2023-03-15 | End: 2024-03-04

## 2023-03-15 RX ORDER — GLIMEPIRIDE 4 MG/1
4 TABLET ORAL
Qty: 180 TABLET | Refills: 3 | Status: SHIPPED | OUTPATIENT
Start: 2023-03-15 | End: 2024-04-01

## 2023-03-15 ASSESSMENT — PAIN SCALES - GENERAL: PAINLEVEL: MODERATE PAIN (5)

## 2023-03-15 NOTE — PATIENT INSTRUCTIONS
Rotate between Tylenol and Ibuprofen for pain     Follow up with pain medicine for back pain.     Continue to monitor diabetes    Follow up in 6 months for diabetic cares.

## 2023-03-15 NOTE — PROGRESS NOTES
Assessment & Plan     Type 2 diabetes mellitus with diabetic polyneuropathy, without long-term current use of insulin (H)  Stage 3a chronic kidney disease (H)  glimepiride 4 mg twice daily.   Atorvastatin 10 mg daily  Aspirin 81 mg daily  A1c level of 8.1 on 10/10/2022. A1c of 7.0 on 3/15/2023  -- did not tolerate actos and stopped this a couple days ago.   -- He went back on his glimepiride and stopped glipizide.   -- Continue with above regimen.   Encouraged healthy diet and exercise.   No albuminuria and BP well controlled so no ACE-I or ARB. Cough with Lisinopril in the past.   - Hemoglobin A1c  - glimepiride (AMARYL) 4 MG tablet  Dispense: 180 tablet; Refill: 3  - atorvastatin (LIPITOR) 10 MG tablet  Dispense: 90 tablet; Refill: 3  - Hemoglobin A1c  - Basic metabolic panel  (Ca, Cl, CO2, Creat, Gluc, K, Na, BUN)  - Lipid panel reflex to direct LDL Fasting    Left-sided low back pain with left-sided sciatica, unspecified chronicity  Patient brought this up at end of appt. Did not have time for exam today. He wishes to see Pain medicine. Has met with Spine surgery but not interested in surgery. Wishes to have something for his pain. Discussed using Tylenol and could consider steroid injections in the back if deemed appropriate by pain medicine.   - Pain Management  Referral    The risks, benefits and treatment options of prescribed medications or other treatments have been discussed with the patient. The patient verbalized their understanding and should call or follow up if no improvement or if they develop further problems.    Follow up with pain medicine.     Diabetic follow up in 6 months.       Jorge Alberto Thompson DO  [facility]    Roberto Deleon is a 78 year old accompanied by his spouse, presenting for the following health issues:  Lipids, Hypertension, and Diabetes      HPI     78 year old male presents for diabetic follow up.     DMT2  HTN  HLD  glimepiride 4 mg twice  daily.   Atorvastatin 10 mg daily  Aspirin 81 mg daily  A1c level of 8.1 on 10/10/2022. A1c of 7.0 on 3/15/2023  Stopped the pioglitazone a couple days ago. Did not tolerate.   Stopped his glipizide and taking glimepiride now.       Back pain   He is s/p L2-3 left sided microdiscectomy with Dr. Johnston at [facility] in July of 2022  Has met with Orthopedic surgery Dr. Damico on 10/17/2022. He determined he does not wish to proceed with any further surgery at this time.   Reports having significant pain in the left back, worse in the morning.   Has been doing exercises he found on the Internet which is slightly helpful.   He has some numbness down the left leg.   No loss of bowel or bladder function.   Has underwent physical therapy which did help.  Wishes to proceed with pain management referral for pain control.       Diabetes Follow-up    How often are you checking your blood sugar? Two times daily  Blood sugar testing frequency justification:  Uncontrolled diabetes  What time of day are you checking your blood sugars (select all that apply)?  Before meals  Have you had any blood sugars above 200?  No  Have you had any blood sugars below 70?  No    What symptoms do you notice when your blood sugar is low?  Not applicable    What concerns do you have today about your diabetes? Other: Quit the Actos due to side effects (blurry vision and eye swelling) and started back on the glimiperide.     Do you have any of these symptoms? (Select all that apply)  Numbness in feet, Burning in feet, Blurry vision and Weight gain              Hyperlipidemia Follow-Up      Are you regularly taking any medication or supplement to lower your cholesterol?   Yes- Lipitor    Are you having muscle aches or other side effects that you think could be caused by your cholesterol lowering medication?  No    Hypertension Follow-up      Do you check your blood pressure regularly outside of the clinic? Yes     Are you following a low salt diet?  "No    Are your blood pressures ever more than 140 on the top number (systolic) OR more   than 90 on the bottom number (diastolic), for example 140/90? No    BP Readings from Last 2 Encounters:   03/15/23 120/80   09/26/22 122/80     Hemoglobin A1C (%)   Date Value   10/10/2022 8.1 (H)   07/05/2022 8.2 (H)     LDL Cholesterol Calculated (mg/dL)   Date Value   08/23/2022 59         How many servings of fruits and vegetables do you eat daily?  0-1    On average, how many sweetened beverages do you drink each day (Examples: soda, juice, sweet tea, etc.  Do NOT count diet or artificially sweetened beverages)?   0    How many days per week do you exercise enough to make your heart beat faster? 3 or less    How many minutes a day do you exercise enough to make your heart beat faster? 9 or less  How many days per week do you miss taking your medication? 3    What makes it hard for you to take your medications?  side effects      Review of Systems   Constitutional, HEENT, cardiovascular, pulmonary, gi and gu systems are negative, except as otherwise noted.      Objective    /80 (BP Location: Left arm, Patient Position: Chair, Cuff Size: Adult Regular)   Pulse 71   Temp 97.9  F (36.6  C) (Tympanic)   Resp 24   Ht 1.708 m (5' 7.25\")   Wt 100.7 kg (222 lb)   SpO2 96%   BMI 34.51 kg/m    Body mass index is 34.51 kg/m .  Physical Exam   General: alert, cooperative, no acute distress   CV: RRR, no murmur  Resp: non-labored breathing, clear to auscultation, no wheezing or rales   Abdomen: Soft, non-tender, no guarding.   Extremities: No peripheral edema, calves non-tender.         "

## 2023-05-15 ENCOUNTER — TELEPHONE (OUTPATIENT)
Dept: OTOLARYNGOLOGY | Facility: CLINIC | Age: 78
End: 2023-05-15
Payer: COMMERCIAL

## 2023-05-15 NOTE — TELEPHONE ENCOUNTER
Patient called to clarify why imaging was setting up appointment.   Due for repeat parotid US. Discussed with patient.   Transferred to imaging to schedule    Alexandra BATES RN  [facility]

## 2023-06-07 ENCOUNTER — HOSPITAL ENCOUNTER (OUTPATIENT)
Dept: ULTRASOUND IMAGING | Facility: CLINIC | Age: 78
Discharge: HOME | End: 2023-06-07
Attending: OTOLARYNGOLOGY | Admitting: OTOLARYNGOLOGY
Payer: COMMERCIAL

## 2023-06-07 DIAGNOSIS — K11.8 MASS OF RIGHT PAROTID GLAND: ICD-10-CM

## 2023-06-07 PROCEDURE — 76536 US EXAM OF HEAD AND NECK: CPT

## 2023-06-11 ENCOUNTER — TELEPHONE (OUTPATIENT)
Dept: OTOLARYNGOLOGY | Facility: CLINIC | Age: 78
End: 2023-06-11
Payer: COMMERCIAL

## 2023-06-11 NOTE — TELEPHONE ENCOUNTER
I spoke to patient's wife on the phone.  The patient's parotid lesion is essentially stable from hist previous US.  Given his health issues, observation is warranted.  Patient's wife told me he is more symptomatic. I recommended evaluation in the clinic.     Colt Alonso MD  Department of Otolaryngology-Head and Neck Surgery  [facility]

## 2023-06-15 ENCOUNTER — OFFICE VISIT (OUTPATIENT)
Dept: PALLIATIVE MEDICINE | Facility: CLINIC | Age: 78
End: 2023-06-15
Attending: FAMILY MEDICINE
Payer: COMMERCIAL

## 2023-06-15 VITALS — SYSTOLIC BLOOD PRESSURE: 143 MMHG | DIASTOLIC BLOOD PRESSURE: 79 MMHG | HEART RATE: 67 BPM

## 2023-06-15 DIAGNOSIS — M54.42 LEFT-SIDED LOW BACK PAIN WITH LEFT-SIDED SCIATICA, UNSPECIFIED CHRONICITY: ICD-10-CM

## 2023-06-15 DIAGNOSIS — M54.16 LUMBAR RADICULOPATHY: Primary | ICD-10-CM

## 2023-06-15 PROCEDURE — 99417 PROLNG OP E/M EACH 15 MIN: CPT

## 2023-06-15 PROCEDURE — 99205 OFFICE O/P NEW HI 60 MIN: CPT

## 2023-06-15 ASSESSMENT — PAIN SCALES - GENERAL: PAINLEVEL: MODERATE PAIN (4)

## 2023-06-15 NOTE — PROGRESS NOTES
"  [facility]     Date of visit: 6/15/2023    Assessment:  Pancho Najera Jr. is a 78 year old male with a past medical history significant for left low back pain with left sciatica, DM 2, polyneuropathy, hx of microdiscectomy, osteopenia of lumbar spine, CKD stage 3, bilateral plantar fasciitis, anxiety/depression, urinary incontinence, balance problems, who presents with complaints of left sided low back pain with radiation into pelvis and left groin, intermittent cramping in LLE.     1. Lumbar radiculopathy - Onset of current pain symptoms occurred following left sided L2-3 microdiscectomy and hemilaminectomy with Dr. Johnston at [facility] in July 2022.  Unfortunately, his pain increased immediately following surgery, and, per patient, Dr. Johnston advised him that the surgery \"did not go well,\" and he may need to perform additional surgery to properly address disc extrusion.  Of note, he has longstanding history of low back pain, worked in construction for many years, and sustained multiple injuries on the job.  He describes pain as primarily left-sided, with radiating pain into left groin and pelvis, also notes intermittent cramping in upper LLE.  He denies new onset red flag symptoms in the last week; however, he reports diminished sensation with bowel movements that he describes as left-sided.  Lumbar MRI from 7/8/2022 demonstrated widespread, multilevel degenerative changes, as well as immediate postsurgical changes at L2-3.  On exam, myofascial TTP of lumbar paraspinals noted, positive focal tenderness of left SI and GT bursa.  Mobility is limited, ambulates with walker due to concerns for balance.  TRAE positive on left, sacral thrust and yeoman's negative bilaterally.  SLR on left positive for groin and pelvic pain.  Mild weakness of LLE noted with hip flexion knee extension and flexion, sensation in upper legs intact and equal bilaterally.  Diminished sensation in lower BLE, though he " does have history of polyneuropathy affecting lower legs, feet, and hands.  Etiology of symptoms is likely associated with multiple factors, including widespread degenerative changes of lumbar spine, lumbar radiculopathy, possible SI mediated component, with overlying myofascial component to an extent.      Visit diagnoses:   1. Lumbar radiculopathy    2. Left-sided low back pain with left-sided sciatica, unspecified chronicity        Plan:  The following recommendations were given to the patient. Diagnosis, treatment options, risks, benefits, and alternatives were discussed, and all questions were answered. The patient expressed understanding of the plan for management.     I am recommending a multidisciplinary treatment plan to help this patient better manage his pain.  This includes:     Pain Physical Therapy:  NO   Continue home exercises. We may consider referring to pain PT in the future.     Pain Psychologist to address relaxation, behavioral change, coping style, and other factors important to improvement.  NO    Diagnostic Studies:  Reviewed lumbar MRI from 7/2022 - demonstrated widespread, multilevel degenerative and postsurgical changes. Severe stenosis at L2-3.     Medication Management:     Continue ibuprofen as needed.     Continue hydrocodone - 1/2 tab at bedtime as needed. I will collaborate with primary care regarding opioid management in between visits, will discussed at follow up. He reports receiving hydrocodone prescription for #56 tabs last year (8/8/22 per ), still has at least 20 tablets remaining. He takes 1/2 tab at night as needed for severe pain, uses about 1 night per week, which is not unreasonable. Of note, he take clonazepam 0.5 mg three times daily as needed, will need to ensure he has naloxone at home and advise against concurrent use if medication continued.     Potential procedures:     I am recommended Left L1 and L 2 transformaminal epidural steroid injection. Schedule with  Dr. Yost in [place]. He is having left sided low back pain with radiation into groin and pelvis that seems consistent with L1-2 radiculopathy.     I discussed this case with Dr. Yost after the visit, and the procedure recommendation was revised.  I called Pancho and spoke to him about changing from intralaminar to transforaminal epidural.  He verbalized understanding and is in agreement with the plan.      Other Orders/Referrals:     None     Follow up with CHELSEY Oneil CNP in 4 weeks after the procedure.       Review of Electronic Chart: Today I have also reviewed available medical information in the patient's medical record at [facility] ([facility]) and Care Everywhere (if available), including relevant provider notes, laboratory work, and imaging.     Tish Zuniga, ADIA, CHELSEY, AGNP-C  [facility]         -------------------------------------------------------------------    Subjective     Reason for consultation:    Pancho Najera Jr. is a 78 year old male who is seen in consultation today at the request of Dr. Thompson (PCP) for evaluation of his pain issues and recommendations for management, with specific emphasis on  Left-sided low back pain with left-sided sciatica, unspecified chronicity [M54.42]     My clinical question is:chronic back pain, history of spinal surgery which was not helpful. Reason for Referral:Comprehensive Pain Evaluation Are there any red flags that may impact the assessment or management of the patient?No Red Flags Provider, please review opioid agreement in the process instructions above. Do you agree to these terms?Yes       Please see the [facility] [place] health questionnaire which the patient completed and reviewed with me in detail (if available).     Review of [place] Prescription Monitoring Program (): No concern for abuse or misuse of controlled medications based on this report. Reviewed - clonazepam.    Review of Electronic  "Chart: Today I have also reviewed available medical information in the patient's medical record at [facility] ([facility]), including relevant provider notes, laboratory work, and imaging.     Pain medications are being prescribed by N/A.     Chief Complaint:    Chief Complaint   Patient presents with     Pain     Lower back (Primarily left side, shoots down leg)         HPI:     Pancho Najera Jr. is a 78 year old male presents with a chief complaint of left sided low back pain, intermittent radicular symptoms into LLE that usually do not extend below the knee but occasionally will having cramping pain down into thigh and calf.     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    The pain is Moderate Pain (4) in severity.    The pain is described as left low back - dull, \"fatigued\" feeling, cramping into legs .   The pain is alleviated by exercises (looked up online), hydrocodone, ibuprofen, lying prone, inversion table.    It is exacerbated by prolonged sitting, car rides, overuse/increased activities.    Modalities that have been utilized in the past which were helpful include opioids.    Things that were not helpful, but tried ,include surgery.    The patient has never tried pain PT, injections.  The patient otherwise denies bowel or bladder incontinence, parasthesias, weakness, saddle anesthesia, unintentional weight loss, or fever/chills/sweats.   Pancho Najera Jr. has not been seen at a pain clinic in the past.      -He saw Dr. Dawit Johnston at [facility] after lumbar disc herniation.   -He states he woke up in the morning and had herniated disc, went to [facility] for evaluation, had surgery, then he had increased pain.   -He states he was in the hospital for 5 days, was given adequate pain medication, then was discharged home without medications.   -He states " he does not believe in substances, does not smoke or drink.   -He states he does not have an addictive personality.   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   -After second fall, he broke foot/ankle.      Current Pain Treatments:    1. Medications:    Hydrocodone PRN (leftover from surgery, still has 20 of 56 tabs, filled on 8/8/22)   Ibuprofen 200-400 mg twice daily as needed (takes 1-2 tabs at bedtime)    2. Other therapies:    Procedure - L1-2 GISELLA ordered today    HEP/stretching (researched PT exercises online)      Current Outpatient Medications   Medication     aspirin (ASA) 81 MG EC tablet     atorvastatin (LIPITOR) 10 MG tablet     blood glucose (FREESTYLE TEST STRIPS) test strip     blood glucose (NO BRAND SPECIFIED) lancets standard     blood glucose (NO BRAND SPECIFIED) test strip     blood glucose monitoring (ONE TOUCH ULTRA 2) meter device kit     blood glucose monitoring (SOFTCLIX) lancets     clonazePAM (KLONOPIN) 0.5 MG tablet     glimepiride (AMARYL) 4 MG tablet     ibuprofen (ADVIL/MOTRIN) 200 MG capsule     cycloSPORINE (RESTASIS) 0.05 % ophthalmic emulsion     No current facility-administered medications for this visit.     Allergies   Allergen Reactions     Pneumococcal Vaccine Other (See Comments)     swelling  swelling       Sulfa Antibiotics Hives     Gabapentin Hives     Lisinopril Cough     Metformin      Felt unwell, did not tolerate       No past medical history on file.  No past surgical history on file.  Family History   Problem Relation Age of Onset     Diabetes Mother      Coronary Artery Disease Mother      Uterine Cancer Mother      Lung Cancer Father      Diabetes Father      Asthma Sister      Diabetes Sister      Social History     Socioeconomic History     Marital status:    Tobacco Use     Smoking status: Never     Smokeless tobacco: Never   Vaping Use     Vaping status: Never Used     Passive vaping exposure: Yes   Substance and Sexual  Activity     Alcohol use: Not Currently     Drug use: Never     Sexual activity: Not Currently      ROS: 10 point ROS neg other than the symptoms noted above in the HPI.      Objective      Diagnostic Testing - Imaging/Labs:    Labs:    BMP 7/8/22 - GFR 63    Imaging:   MR Lumbar Spine w/o Contrast  Order: [id number]  Impression    1.  Expected immediate postsurgical changes related to left L2-L3 hemilaminectomy, medial facet resection and partial discectomy.   2.  Nonspecific heterogeneous soft tissue signal intensity within the left subarticular zone at the L2-L3 level likely reflects a a combination of postsurgical fluid and blood products, gas and residual disc material. This results in persistent severe left subarticular zone stenosis which may affect the traversing L3 nerve roots.  Narrative    For Patients: As a result of the 21st Century Cures Act, medical imaging exams and procedure reports are released immediately into your electronic medical record. You may view this report before your referring provider. If you have questions, please contact your health care provider.     EXAM: MR SPINE LUMBAR WO   LOCATION: [facility]   DATE/TIME: 7/8/2022 12:12 PM     INDICATION: Low back pain, prior surgery, new symptoms   COMPARISON: 07/06/2022 intraoperative radiograph   TECHNIQUE: Routine Lumbar Spine MRI without IV contrast.     FINDINGS:   Nomenclature is based on 5 lumbar type vertebral bodies. Normal vertebral body heights. 0.2 cm retrolisthesis of L2 on L3. Alignment otherwise normal. Immediate postsurgical changes related to left L2-L3 hemilaminectomy, medial facet resection and partial discectomy. There is edema fluid within the surgical tract and laminectomy defect and along the left lateral and anterolateral epidural space. Intermediate signal intensity soft tissue within the left anterolateral epidural space/lateral recess reflects a combination of seroma, blood products, gas and residual disc  material. There is persistent severe left lateral recess stenosis. No evidence for mirlande epidural hematoma.     The prevertebral soft tissues are unremarkable. Postsurgical soft tissue edema within the left posterior paraspinous subcutaneous soft tissues and the paraspinous musculature.     T12-L1: Mild disc height loss. Symmetric disc bulge with endplate osteophyte are normal facets. No spinal canal or foraminal stenosis.     L1-L2: Mild disc height loss. Right asymmetric disc bulge with endplate osteophyte. Mild right subarticular zone stenosis. No spinal canal stenosis. Mild right foraminal stenosis. No left foraminal stenosis.     L2-L3: Moderate disc height loss. Symmetric disc bulge with trace residual caudally migrated left subarticular disc extrusion. Severe left subarticular zone stenosis with traversing nerve root contact/impingement. Moderate left foraminal stenosis with exiting nerve root contact. No right foraminal stenosis.     L3-L4: Normal disc height. Partial loss of disc signal. Mild symmetric disc bulge. Mild facet arthrosis. Mild spinal canal and mild bilateral foraminal stenosis.     L4-L5: Normal disc height. Partial loss of disc signal. Mild symmetric disc bulge and mild facet osteoarthrosis. Posterior annular fissuring. No spinal canal stenosis. Mild bilateral foraminal stenosis     L5-S1: Mild disc height loss. Loss of disc T2-weighted signal. Mild symmetric disc bulge with posterior annular fissure and asymmetric mild encroachment inferior aspect of the right foramen. Bilateral facet arthrosis. No spinal canal stenosis or traversing nerve root impingement. Moderate right foraminal stenosis. Mild left foraminal stenosis.  Images on Order [id number]    Image Retrieve     The full-size image has not yet been retrieved from an outside organization. To retrieve, click the link below.  Scan on 7/8/2022: MR SPINE LUMBAR WO CONTRAST        Scan on 7/8/2022 12:02 PM: MR SPINE LUMBAR WO CONTRAST             Physical Exam  HENT:      Head: Normocephalic.   Pulmonary:      Effort: Pulmonary effort is normal.   Musculoskeletal:      Comments: See below   Neurological:      Mental Status: He is alert and oriented to person, place, and time.      Comments: See below    Psychiatric:         Mood and Affect: Mood normal.           Musculoskeletal exam:  Patient has antalgic gait favoring the neither side.   Normal bulk and tone. Unremarkable spinal curvature.     Thoracic spine:    Kyphosis. Subtle kyphotic appearance   Tenderness in the thoracic paraspinal muscles.No    Lumbar spine:    Tenderness in the lumbar paraspinal muscles.Yes   Rotation/ext to right: not assessed today    Rotation/ext to left: not assessed today    Myofascial tenderness:  As noted   Focal tenderness: positive left SI joint and GT tenderness, negative gluteal or piriformis tenderness  Straight leg raise: positive for pelvic/groin pain on right   TRAE: positive on left side   Sacral thrust/Yeoman's: negative    Hip exam:   Normal internal and external range of motion bilaterally. FADIR positive on right.     Neurologic exam:  CN:  Cranial nerves 2-12 are grossly intact  Motor:  5/5 UE and LE strength      Hip flexion (Iliosoas L1,2,3)     R: 5/5 L: 4/5  Knee extension (quadricepts L2,3,4)    R: 5/5 L: 4/5  Ankle dorsiflexion (tibialis anterior L4,5)   R: 5/5 L: 5/5    Ankle plantarflexion (gastrocnemius, soleus S1-2)  R: 5/5 L: 5/5  Hip abduction (gluteus medius L4,5, S1)   R: 5/5 L: 5/5  Knee flexion (hamstrings L5, S1-2)    R: 4/5 L: 4/5    Reflexes:     Biceps:     R:  2/4 L: 2/4   Brachioradialis   R:  2/4 L: 2/4   Patella:  R:  /4 L: /4   Achilles:  R:  1/4 L: 1/4    Sensory:   Light touch: diminished sensory of lower extremities  below knees    No allodynia, dysesthesia, or hyperalgesia.        BILLING TIME DOCUMENTATION:   The total TIME spent on this patient on the date of the encounter/appointment was 90 minutes.      TOTAL TIME  includes:   Time spent preparing to see the patient (reviewing records and tests)   Time spent face to face (or over the phone) with the patient   Time spent ordering tests, medications, procedures and referrals   Time spent Referring and communicating with other healthcare professionals   Time spent documenting clinical information in Epic

## 2023-06-15 NOTE — Clinical Note
Hi Dr. Thompson,  I saw Pancho today for pain consult and am reaching out to collaborate on care.   He continues to use 1/2 tab of norco at night as needed, which he was prescribed in August 2022 by Dr. Johnston.  Per , he received 56 tablets, and he reports still having at least 20 tablets remaining.  He reported discussing continuation of hydrocodone with you at prior visit, which he stated you were not in favor of.    Given sparing usage, I do not think it is unreasonable to continue on a truly as-needed basis.  I am wondering what your thoughts are with ongoing management of low-dose Norco.  I told Pancho today I would be messaging you, and, while I would be agreeable to take on prescribing initially, eventually I would be looking to collaborate with you for long-term management.  Thoughts?  I look forward to hearing from you.  Thanks, Tish Zuniga, DNP, APRN, AGNP-C [facility]

## 2023-06-15 NOTE — PATIENT INSTRUCTIONS
Pain Physical Therapy:  NO   Continue home exercises. We may consider referring to pain PT in the future.     Pain Psychologist to address relaxation, behavioral change, coping style, and other factors important to improvement.  NO    Diagnostic Studies:  Reviewed lumbar MRI from 7/2022 - demonstrated widespread, multilevel degenerative and postsurgical changes. Severe stenosis at L2-3.     Medication Management:   Continue ibuprofen as needed.   Continue hydrocodone - 1/2 tab at bedtime as needed. I will collaborate with primary care regarding opioid management in between visits, will discussed at follow up.     Potential procedures:   I am recommended L1-2 epidural steroid injection. Schedule with Dr. Yost in [place]. Either provider of clinic RN will contact you if there are changes to recommended procedure.     Other Orders/Referrals:   None     Follow up with CHELSEY Oneil CNP in 4 weeks after the procedure.       ----------------------------------------------------------------  Clinic Number:  [phone]   Call with any questions about your care and for scheduling assistance.   Calls are returned Monday through Friday between 8 AM and 4:30 PM. We usually get back to you within 2 business days depending on the issue/request.    If we are prescribing your medications:  For opioid medication refills, call the clinic or send a EnglishUp message 7 days in advance.  Please include:  Name of requested medication  Name of the pharmacy.  For non-opioid medications, call your pharmacy directly to request a refill. Please allow 3-4 days to be processed.   Per [place] State Law:  All controlled substance prescriptions must be filled within 30 days of being written.    For those controlled substances allowing refills, pickup must occur within 30 days of last fill.      We believe regular attendance is key to your success in our program!    Any time you are unable to keep your appointment we ask that you call us at least  24 hours in advance to cancel.This will allow us to offer the appointment time to another patient.   Multiple missed appointments may lead to dismissal from the clinic.

## 2023-06-19 ENCOUNTER — TELEPHONE (OUTPATIENT)
Dept: PALLIATIVE MEDICINE | Facility: CLINIC | Age: 78
End: 2023-06-19
Payer: COMMERCIAL

## 2023-06-19 NOTE — TELEPHONE ENCOUNTER
Screening Questions for Radiology Injections:    Injection to be done at which interventional clinic site? [facility] [place]    Procedure ordered by Tish Zuniga APRN CNP     Procedure ordered? Left Lumbar 1 and Lumbar 2 transforaminal epidural steroid injection- Hx of L2-3 microdiscectomy and laminectomy    Transforaminal Cervical GISELLA - Send to [facility] - No  Community [facility] providers perform this procedure    What insurance would patient like us to bill for this procedure? AETNA & Medicare     IF SCHEDULING IN [place] [facility] OR [facility] PLEASE SCHEDULE AT LEAST 7-10 BUSINESS DAYS OUT SO A PA CAN BE OBTAINED    Worker's comp or MVA (motor vehicle accident) -Any injection DO NOT SCHEDULE and route to Dayami Leach      [employer] insurance - For SI joint injections, DO NOT SCHEDULE and route to Caryl Surjit.       ALL BCBS, [employer] and [employer]IG[employer] - DO NOT SCHEDULE and route to Caryl Betancury      MEDICA- facet joint injections, route to [facility]    Is patient scheduled at [facility]?    If YES, route every encounter to [facility] CARE NAVIGATION POOL [[phone]]    Is an  needed? No     Patient has a  home? (Review Grid) YES: Informed     Any chance of pregnancy? Not Applicable   If YES, do NOT schedule and route to RN pool  - Dr. Yin route to Nicole Bernard and PM&R Nurse  [[id number]]      Is patient actively being treated for cancer or immunocompromised? No  If YES, do NOT schedule and route to RN pool/ Dr. Yin's Team    Does the patient have a bleeding or clotting disorder? No     If YES, okay to schedule AND route to RN nurse sneha/ Dr. Yin's Team     (For any patients with platelet count <100, RN must forward to provider)    Is patient taking any Blood Thinners OR Antiplatelet medication?  No   If hold needed, do NOT schedule, route to RN pool/ Dr. Yin's Team    Examples:   o Blood Thinners: (Coumadin, Warfarin, Jantoven, Pradaxa,  Xarelto, Eliquis, Edoxaban, Enoxaparin, Lovenox, Heparin, Arixtra, Fondaparinux or Fragmin)  o Antiplatelet Medications: (Plavix, Brilinta or Effient)     Is patient taking any aspirin products (includes Excedrin and Fiorinal)? Yes - Pt takes 81 mg daily; instructed to hold 0 day(s) prior to procedure.      If more than 325mg/day, OK to schedule; Instruct Pt to decrease to less than 325 mg for 7 days AND route to RN pool/ Dr. Yin's Team     For CERVICAL procedures, hold all aspirin products for 6 days.     Tell Pt that if aspirin product is not held for 6 days, the procedure WILL BE cancelled.     Any allergies to contrast dye, iodine, shellfish, or numbing and steroid medications? No    If YES, schedule and add allergy information to appointment notes AND route to the RN pool/ Dr. Yin's Team    If GISELLA and Contrast Dye / Iodine Allergy? DO NOT SCHEDULE, route to RN pool/ Dr. Yin's Team    Allergies: Pneumococcal vaccine, Sulfa antibiotics, Gabapentin, Lisinopril, and Metformin     Does patient have an active infection or treated for one within the past week? No    Is patient currently taking any antibiotics or steroid medications?  No     For patients on chronic, preventative, or prophylactic antibiotics, procedures may be scheduled.     For patients on antibiotics for active or recent infection, schedule 4 days after completed.    For patients on steroid medications, schedule 4 days after completed.     Has the patient had a flu shot or any other vaccinations within the past 7 days? No  If yes, explain that for the vaccine to work best they need to:       wait 1 week before and 1 week after getting any Vaccine    wait 1 week before and 2 weeks after getting Covid Vaccine #2 or BOOSTER    If patient has concerns about the timing, send to RN pool/ Dr. Yin's Team    Does patient have an MRI/CT?  YES: 07/08/22 Include Date and Check Procedure Scheduling Grid to see if required.    Was the MRI/CT done within  the last 3 years?  Yes     If no route to RN Pool/ Dr. Yin's Team    If yes, where was the MRI/CT done? [facility]      Refer to PACS Transmissions list for approved external locations and route to RN Pool High Priority/ Dr. Carvajals Team    If MRI was not done at approved external location do NOT schedule and route to RN pool/ Dr. Yin's Team      If patient has an imaging disc, the injection MAY be scheduled but patient must bring disc to appt or appt will be cancelled.    Is patient able to transfer to a procedure table with minimal or no assistance? Yes     If no, do NOT schedule and route to RN Pool/ Dr. Yin's Team    Procedure Specific Instructions:    If celiac plexus block, informed patient NPO for 6 hours and that it is okay to take medications with sips of water, especially blood pressure medications Not Applicable         If this is for a cervical procedure, informed patient that aspirin needs to be held for 6 days.   Not Applicable      Sedation, If Sedation is ordered for any procedure, patient must be NPO for 6 hours prior to procedure Not Applicable      If IV needed:    Do not schedule procedures requiring IV placement in the first appointment of the day or first appointment after lunch. Do NOT schedule at 0745, 0815 or 1245.       Instructed patient to arrive 30 minutes early for IV start if required. (Check Procedure Scheduling Grid)  Not Applicable    Reminders:    If you are started on any steroids or antibiotics between now and your appointment, you must contact us because the procedure may need to be cancelled.  Yes      As a reminder, receiving steroids can decrease your body's ability to fight infection.   Would you still like to move forward with scheduling the injection?  Yes      IV Sedation is not provided for procedures. If oral anti-anxiety medication is needed, the patient should request this from their referring provider.      Instruct patient to arrive as directed prior to  the scheduled appointment time:  If IV needed 30 minutes before appointment time       For patients 85 or older we recommend having an adult stay w/ them for the remainder of the day.       If the patient is Diabetic, remind them to bring their glucometer.      Does the patient have any questions?  NO  Amie Jarrett  [facility]

## 2023-06-27 NOTE — PROGRESS NOTES
Chief Complaint   Patient presents with     Follow Up     Parotid US - c/o dizzy spells at intervals with rolling over in bed- this morning he had the worst dizzy spell with pain from his head to his chest     History of Present Illness  Pancho Najera Jr. is a 78 year old male who presents today for follow-up.  I evaluated the patient initially in September 2021.  The patient had a right parotid lesion that was in previous imaging.  The patient underwent an ultrasound-guided needle biopsy on 9/14/2021. The final biopsy was atypical cells of undetermined significance. There were no obvious malignant cells in the biopsy.  We discussed close observation with consideration of repeat biopsy versus potential repeat imaging including MRI for follow-up.  When I saw the patient for follow-up in October 2021, the area in his right parotid seem to have resolved.  We discussed observation.  The patient was undergoing imaging to rule out acute vertigo.  My review of the patient's CT angiogram of the head and neck from 5/24/2022 again demonstrates a mass straddling the superficial and deep lobes of the parotid gland extending behind the mandible measuring 2.8 cm in greatest dimension.  Given that the lesion was redemonstrated on imaging, I ordered a repeat ultrasound-guided needle biopsy of the parotid.  The needle biopsy performed 6/9/2022 was satisfactory for evaluation.  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  Given the benign nature of the mass, the patient's age and other health conditions, I recommended observation with follow up US imaging.    The patient underwent follow up parotid ultrasound on 6/7/2023. My review of the repeat parotid US shows two adjacent hypoechoic masses in the right parotid, the larger measures 2.5 x 0.7 x 1.5 cm (2.3 x 0.7 x 1.7 cm previous). The second smaller lesion measures 1.5 x 0.7 x 1.1 cm  (1.1 x 0.8 x 1.0 cm previous).  A third hypoechoic more superficial lesion not seen previously measures 7 x 7 x 3 mm.    The patient returns today for follow-up.     Since last seeing the patient, the patient reports multiple concerns.  He reports having occipital headaches that extend over the right parietal area.  His headaches are always on the right.  He previously saw neurology and underwent occipital nerve injection with good results but he did not continue to follow with neurology.  He is also having significant neck and back discomfort.  He reports some imbalance from time to time due to an injury where something fell on his head.  Been also having vertigo episodes when turning over in bed.  He denies any dysphagia, odynophagia, pharyngodynia, otalgia, hemoptysis.  He feels like his voice is a bit hoarse especially when his throat is dry.  He is a mouth breather at nighttime. He denies any other neck lumps/bumps/swelling.  No unintentional weight loss.  He is a lifetime non-smoker but has had some secondhand smoke exposure.    Past Medical History  Patient Active Problem List   Diagnosis     Anxiety state     Chest pain     Depression     Obstructive sleep apnea syndrome     History of respiratory system disease     Pleural plaque     Multiple pulmonary nodules     Ventricular premature beats     Ventricular bigeminy     Chronic left-sided low back pain without sciatica     Chronic seasonal allergic rhinitis     Stage 3a chronic kidney disease (H)     Mixed hyperlipidemia     Type 2 diabetes mellitus with diabetic polyneuropathy, without long-term current use of insulin (H)     Balance problems     Gastroesophageal reflux disease with esophagitis     Plantar fasciitis, bilateral     Asbestos exposure     Atrophic gastritis     BMI 32.0-32.9,adult     Hearing loss, bilateral     Osteopenia of lumbar spine     Polyneuropathy     Urinary incontinence, unspecified type     Vitamin D deficiency     Left-sided low  back pain with left-sided sciatica, unspecified chronicity     History of microdiscectomy     Current Medications    Current Outpatient Medications:      aspirin (ASA) 81 MG EC tablet, Take 81 mg by mouth, Disp: , Rfl:      atorvastatin (LIPITOR) 10 MG tablet, Take 1 tablet (10 mg) by mouth daily, Disp: 90 tablet, Rfl: 3     blood glucose (FREESTYLE TEST STRIPS) test strip, 1 strip by In Vitro route 2 times daily, Disp: 200 strip, Rfl: 3     blood glucose (NO BRAND SPECIFIED) lancets standard, Use to test blood sugar 2 times daily ., Disp: 100 lancet, Rfl: 2     blood glucose (NO BRAND SPECIFIED) test strip, Use to test blood sugar 2 times daily., Disp: 100 strip, Rfl: 2     blood glucose monitoring (ONE TOUCH ULTRA 2) meter device kit, Use to test blood sugar 2 times daily, Disp: 1 kit, Rfl: 0     blood glucose monitoring (SOFTCLIX) lancets, , Disp: , Rfl:      clonazePAM (KLONOPIN) 0.5 MG tablet, Take 0.5 mg by mouth 3 times daily as needed , Disp: , Rfl:      cycloSPORINE (RESTASIS) 0.05 % ophthalmic emulsion, , Disp: , Rfl:      glimepiride (AMARYL) 4 MG tablet, Take 1 tablet (4 mg) by mouth 2 times daily, Disp: 180 tablet, Rfl: 3     ibuprofen (ADVIL/MOTRIN) 200 MG capsule, Take 200 mg by mouth every 4 hours as needed for fever, Disp: , Rfl:     Allergies  Allergies   Allergen Reactions     Pneumococcal Vaccine Other (See Comments)     swelling  swelling       Sulfa Antibiotics Hives     Gabapentin Hives     Lisinopril Cough     Metformin      Felt unwell, did not tolerate        Social History  Social History     Socioeconomic History     Marital status:    Tobacco Use     Smoking status: Never     Smokeless tobacco: Never   Vaping Use     Vaping Use: Never used   Substance and Sexual Activity     Alcohol use: Not Currently     Drug use: Never     Sexual activity: Not Currently       Family History  Family History   Problem Relation Age of Onset     Diabetes Mother      Coronary Artery Disease Mother       Uterine Cancer Mother      Lung Cancer Father      Diabetes Father      Asthma Sister      Diabetes Sister      Other - See Comments Sister         in coma for 2 years after asthma attack       Review of Systems  As per HPI and PMHx, otherwise 10 system review including the head and neck, constitutional, eyes, respiratory, GI, skin, neurologic, lymphatic, endocrine, and allergy systems is negative.    Physical Exam  BP (!) 149/83 (BP Location: Right arm, Patient Position: Chair, Cuff Size: Adult Large)   Pulse 66   Temp 97.8  F (36.6  C) (Tympanic)   Wt 99.3 kg (219 lb)   BMI 34.05 kg/m    GENERAL: Patient is a pleasant, cooperative 78 year old male in no acute distress.  HEAD: Normocephalic, atraumatic.  Hair and scalp are normal.  EYES: Pupils are equal, round, reactive to light and accommodation.  Extraocular movements are intact.  The sclera nonicteric without injection.  The extraocular structures are normal.  EARS: Normal shape and symmetry.  No tenderness when palpating the mastoid or tragal areas bilaterally.  Otoscopic exam reveals a minimal amount of cerumen bilaterally.  The bilateral tympanic membranes are round, intact without evidence of effusion, good landmarks.  No retraction, granulation, or drainage.  NOSE: Nares are patent.  Nasal mucosa is again moist.  Rightward anterior and leftward posterior nasal septal deviation.  No nasal cavity masses, polyps, or mucopurulence on anterior rhinoscopy.  ORAL CAVITY: Dentition is in reasonably good repair.  Mucous membranes are slightly dry.  Tongue is mobile, protrudes to the midline.  Palate elevates symmetrically.  Tonsils are 1.5+, symmetric.  No erythema or exudate.  No oral cavity or oropharyngeal masses, lesions, ulcerations, leukoplakia.  NECK: Supple, trachea is midline.  Palpation of the right parotid gland does not reveal discrete mass but does have general fullness.  There no additional palpable cervical lymphadenopathy or masses  bilaterally.  Palpation of the left parotid and bilateral submandibular areas reveal no masses.  No thyromegaly.  NEUROLOGIC: Cranial nerves II through XII are grossly intact.  Voice is strong.  Patient is House-Brackmann I/VI bilaterally.  CARDIOVASCULAR: Extremities are warm and well-perfused.  No significant peripheral edema.  RESPIRATORY: Patient has nonlabored breathing without cough, wheeze, stridor.  PSYCHIATRIC: Patient is alert and oriented.  Mood and affect appear normal.  SKIN: Warm and dry.  No scalp, face, or neck lesions noted.    Assessment and Plan     ICD-10-CM    1. History of parotitis  Z87.19 US Parotid      2. Mass of right parotid gland  K11.8 US Parotid      3. Benign positional vertigo, unspecified laterality  H81.10 Physical Therapy Referral     US Parotid      4. Headache, unspecified headache type  R51.9 Adult Neurology  Referral         It was my pleasure seeing Pancho ABEL Najera Jr. today in clinic.  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  This would be a very difficult tumor to remove would almost certainly cause temporary facial nerve paresis and be at high risk for nerve injury given its appearance on imaging.  Given the patient's other health issues, I am not certain parotidectomy be the best choice for this patient.  The tumor in his right parotid gland is benign and is not causing any of his other headache symptoms or his neck and back issues.  I encouraged him to follow-up with his primary care provider for his neck and back issues.  I did place a referral to neurology for headache evaluation, but the family declined evaluation.     Given that we know that the mass is benign on needle biopsy, we can obtain another ultrasound in 12 months to follow size of the lesion.    From a dizziness standpoint, this sounds to be consistent with positional vertigo.  He has not seen physical therapy in the past.  Referral  was placed for physical therapy for positional vertigo.    He is also having headache symptoms.  The last time he was seen in clinic, I placed a referral to neurology but this was not scheduled.  I will replace the headache referral.    Pancho to follow up with Primary Care provider regarding elevated blood pressure.    Colt Alonso MD  Department of Otolaryngology-Head and Neck Surgery  [facility]

## 2023-06-28 ENCOUNTER — OFFICE VISIT (OUTPATIENT)
Dept: OTOLARYNGOLOGY | Facility: CLINIC | Age: 78
End: 2023-06-28
Payer: COMMERCIAL

## 2023-06-28 VITALS
SYSTOLIC BLOOD PRESSURE: 149 MMHG | WEIGHT: 219 LBS | HEART RATE: 66 BPM | BODY MASS INDEX: 34.05 KG/M2 | DIASTOLIC BLOOD PRESSURE: 83 MMHG | TEMPERATURE: 97.8 F

## 2023-06-28 DIAGNOSIS — R51.9 HEADACHE, UNSPECIFIED HEADACHE TYPE: ICD-10-CM

## 2023-06-28 DIAGNOSIS — K11.8 MASS OF RIGHT PAROTID GLAND: ICD-10-CM

## 2023-06-28 DIAGNOSIS — H81.10 BENIGN POSITIONAL VERTIGO, UNSPECIFIED LATERALITY: ICD-10-CM

## 2023-06-28 DIAGNOSIS — Z87.19 HISTORY OF PAROTITIS: Primary | ICD-10-CM

## 2023-06-28 PROCEDURE — 99213 OFFICE O/P EST LOW 20 MIN: CPT | Performed by: OTOLARYNGOLOGY

## 2023-06-28 ASSESSMENT — PAIN SCALES - GENERAL: PAINLEVEL: NO PAIN (0)

## 2023-06-28 NOTE — NURSING NOTE
"Initial BP (!) 149/83 (BP Location: Right arm, Patient Position: Chair, Cuff Size: Adult Large)   Pulse 66   Temp 97.8  F (36.6  C) (Tympanic)   Wt 99.3 kg (219 lb)   BMI 34.05 kg/m   Estimated body mass index is 34.05 kg/m  as calculated from the following:    Height as of 3/15/23: 1.708 m (5' 7.25\").    Weight as of this encounter: 99.3 kg (219 lb). .    Rose Hua LPN    "

## 2023-06-28 NOTE — LETTER
6/28/2023         RE: Pancho Najera Jr.  [address]  [facility] [address]        Dear Colleague,    Thank you for referring your patient, Pancho Najera Jr., to the [facility]. Please see a copy of my visit note below.    Chief Complaint   Patient presents with     Follow Up     Parotid US - c/o dizzy spells at intervals with rolling over in bed- this morning he had the worst dizzy spell with pain from his head to his chest     History of Present Illness  Pancho Najera Jr. is a 78 year old male who presents today for follow-up.  I evaluated the patient initially in September 2021.  The patient had a right parotid lesion that was in previous imaging.  The patient underwent an ultrasound-guided needle biopsy on 9/14/2021. The final biopsy was atypical cells of undetermined significance. There were no obvious malignant cells in the biopsy.  We discussed close observation with consideration of repeat biopsy versus potential repeat imaging including MRI for follow-up.  When I saw the patient for follow-up in October 2021, the area in his right parotid seem to have resolved.  We discussed observation.  The patient was undergoing imaging to rule out acute vertigo.  My review of the patient's CT angiogram of the head and neck from 5/24/2022 again demonstrates a mass straddling the superficial and deep lobes of the parotid gland extending behind the mandible measuring 2.8 cm in greatest dimension.  Given that the lesion was redemonstrated on imaging, I ordered a repeat ultrasound-guided needle biopsy of the parotid.  The needle biopsy performed 6/9/2022 was satisfactory for evaluation.  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  Given the benign nature of the mass, the patient's age and other health conditions, I recommended observation with follow up US imaging.    The patient underwent  follow up parotid ultrasound on 6/7/2023. My review of the repeat parotid US shows two adjacent hypoechoic masses in the right parotid, the larger measures 2.5 x 0.7 x 1.5 cm (2.3 x 0.7 x 1.7 cm previous). The second smaller lesion measures 1.5 x 0.7 x 1.1 cm (1.1 x 0.8 x 1.0 cm previous).  A third hypoechoic more superficial lesion not seen previously measures 7 x 7 x 3 mm.    The patient returns today for follow-up.     Since last seeing the patient, the patient reports multiple concerns.  He reports having occipital headaches that extend over the right parietal area.  His headaches are always on the right.  He previously saw neurology and underwent occipital nerve injection with good results but he did not continue to follow with neurology.  He is also having significant neck and back discomfort.  He reports some imbalance from time to time due to an injury where something fell on his head.  Been also having vertigo episodes when turning over in bed.  He denies any dysphagia, odynophagia, pharyngodynia, otalgia, hemoptysis.  He feels like his voice is a bit hoarse especially when his throat is dry.  He is a mouth breather at nighttime. He denies any other neck lumps/bumps/swelling.  No unintentional weight loss.  He is a lifetime non-smoker but has had some secondhand smoke exposure.    Past Medical History  Patient Active Problem List   Diagnosis     Anxiety state     Chest pain     Depression     Obstructive sleep apnea syndrome     History of respiratory system disease     Pleural plaque     Multiple pulmonary nodules     Ventricular premature beats     Ventricular bigeminy     Chronic left-sided low back pain without sciatica     Chronic seasonal allergic rhinitis     Stage 3a chronic kidney disease (H)     Mixed hyperlipidemia     Type 2 diabetes mellitus with diabetic polyneuropathy, without long-term current use of insulin (H)     Balance problems     Gastroesophageal reflux disease with esophagitis      Plantar fasciitis, bilateral     Asbestos exposure     Atrophic gastritis     BMI 32.0-32.9,adult     Hearing loss, bilateral     Osteopenia of lumbar spine     Polyneuropathy     Urinary incontinence, unspecified type     Vitamin D deficiency     Left-sided low back pain with left-sided sciatica, unspecified chronicity     History of microdiscectomy     Current Medications    Current Outpatient Medications:      aspirin (ASA) 81 MG EC tablet, Take 81 mg by mouth, Disp: , Rfl:      atorvastatin (LIPITOR) 10 MG tablet, Take 1 tablet (10 mg) by mouth daily, Disp: 90 tablet, Rfl: 3     blood glucose (FREESTYLE TEST STRIPS) test strip, 1 strip by In Vitro route 2 times daily, Disp: 200 strip, Rfl: 3     blood glucose (NO BRAND SPECIFIED) lancets standard, Use to test blood sugar 2 times daily ., Disp: 100 lancet, Rfl: 2     blood glucose (NO BRAND SPECIFIED) test strip, Use to test blood sugar 2 times daily., Disp: 100 strip, Rfl: 2     blood glucose monitoring (ONE TOUCH ULTRA 2) meter device kit, Use to test blood sugar 2 times daily, Disp: 1 kit, Rfl: 0     blood glucose monitoring (SOFTCLIX) lancets, , Disp: , Rfl:      clonazePAM (KLONOPIN) 0.5 MG tablet, Take 0.5 mg by mouth 3 times daily as needed , Disp: , Rfl:      cycloSPORINE (RESTASIS) 0.05 % ophthalmic emulsion, , Disp: , Rfl:      glimepiride (AMARYL) 4 MG tablet, Take 1 tablet (4 mg) by mouth 2 times daily, Disp: 180 tablet, Rfl: 3     ibuprofen (ADVIL/MOTRIN) 200 MG capsule, Take 200 mg by mouth every 4 hours as needed for fever, Disp: , Rfl:     Allergies  Allergies   Allergen Reactions     Pneumococcal Vaccine Other (See Comments)     swelling  swelling       Sulfa Antibiotics Hives     Gabapentin Hives     Lisinopril Cough     Metformin      Felt unwell, did not tolerate        Social History  Social History     Socioeconomic History     Marital status:    Tobacco Use     Smoking status: Never     Smokeless tobacco: Never   Vaping Use      Vaping Use: Never used   Substance and Sexual Activity     Alcohol use: Not Currently     Drug use: Never     Sexual activity: Not Currently       Family History  Family History   Problem Relation Age of Onset     Diabetes Mother      Coronary Artery Disease Mother      Uterine Cancer Mother      Lung Cancer Father      Diabetes Father      Asthma Sister      Diabetes Sister      Other - See Comments Sister         in coma for 2 years after asthma attack       Review of Systems  As per HPI and PMHx, otherwise 10 system review including the head and neck, constitutional, eyes, respiratory, GI, skin, neurologic, lymphatic, endocrine, and allergy systems is negative.    Physical Exam  BP (!) 149/83 (BP Location: Right arm, Patient Position: Chair, Cuff Size: Adult Large)   Pulse 66   Temp 97.8  F (36.6  C) (Tympanic)   Wt 99.3 kg (219 lb)   BMI 34.05 kg/m    GENERAL: Patient is a pleasant, cooperative 78 year old male in no acute distress.  HEAD: Normocephalic, atraumatic.  Hair and scalp are normal.  EYES: Pupils are equal, round, reactive to light and accommodation.  Extraocular movements are intact.  The sclera nonicteric without injection.  The extraocular structures are normal.  EARS: Normal shape and symmetry.  No tenderness when palpating the mastoid or tragal areas bilaterally.  Otoscopic exam reveals a minimal amount of cerumen bilaterally.  The bilateral tympanic membranes are round, intact without evidence of effusion, good landmarks.  No retraction, granulation, or drainage.  NOSE: Nares are patent.  Nasal mucosa is again moist.  Rightward anterior and leftward posterior nasal septal deviation.  No nasal cavity masses, polyps, or mucopurulence on anterior rhinoscopy.  ORAL CAVITY: Dentition is in reasonably good repair.  Mucous membranes are slightly dry.  Tongue is mobile, protrudes to the midline.  Palate elevates symmetrically.  Tonsils are 1.5+, symmetric.  No erythema or exudate.  No oral cavity or  oropharyngeal masses, lesions, ulcerations, leukoplakia.  NECK: Supple, trachea is midline.  Palpation of the right parotid gland does not reveal discrete mass but does have general fullness.  There no additional palpable cervical lymphadenopathy or masses bilaterally.  Palpation of the left parotid and bilateral submandibular areas reveal no masses.  No thyromegaly.  NEUROLOGIC: Cranial nerves II through XII are grossly intact.  Voice is strong.  Patient is House-Brackmann I/VI bilaterally.  CARDIOVASCULAR: Extremities are warm and well-perfused.  No significant peripheral edema.  RESPIRATORY: Patient has nonlabored breathing without cough, wheeze, stridor.  PSYCHIATRIC: Patient is alert and oriented.  Mood and affect appear normal.  SKIN: Warm and dry.  No scalp, face, or neck lesions noted.    Assessment and Plan     ICD-10-CM    1. History of parotitis  Z87.19 US Parotid      2. Mass of right parotid gland  K11.8 US Parotid      3. Benign positional vertigo, unspecified laterality  H81.10 Physical Therapy Referral     US Parotid      4. Headache, unspecified headache type  R51.9 Adult Neurology  Referral         It was my pleasure seeing Pancho Najera Jr. today in clinic.  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  This would be a very difficult tumor to remove would almost certainly cause temporary facial nerve paresis and be at high risk for nerve injury given its appearance on imaging.  Given the patient's other health issues, I am not certain parotidectomy be the best choice for this patient.  The tumor in his right parotid gland is benign and is not causing any of his other headache symptoms or his neck and back issues.  I encouraged him to follow-up with his primary care provider for his neck and back issues.  I did place a referral to neurology for headache evaluation, but the family declined evaluation.     Given that we know that  the mass is benign on needle biopsy, we can obtain another ultrasound in 12 months to follow size of the lesion.    From a dizziness standpoint, this sounds to be consistent with positional vertigo.  He has not seen physical therapy in the past.  Referral was placed for physical therapy for positional vertigo.    He is also having headache symptoms.  The last time he was seen in clinic, I placed a referral to neurology but this was not scheduled.  I will replace the headache referral.    Pancho to follow up with Primary Care provider regarding elevated blood pressure.    Colt Alonso MD  Department of Otolaryngology-Head and Neck Surgery  [facility]         Again, thank you for allowing me to participate in the care of your patient.        Sincerely,        Colt Alonso MD

## 2023-07-14 ENCOUNTER — RADIOLOGY INJECTION OFFICE VISIT (OUTPATIENT)
Dept: PALLIATIVE MEDICINE | Facility: CLINIC | Age: 78
End: 2023-07-14
Payer: COMMERCIAL

## 2023-07-14 VITALS — DIASTOLIC BLOOD PRESSURE: 85 MMHG | HEART RATE: 72 BPM | OXYGEN SATURATION: 94 % | SYSTOLIC BLOOD PRESSURE: 150 MMHG

## 2023-07-14 DIAGNOSIS — M54.16 LUMBAR RADICULOPATHY: ICD-10-CM

## 2023-07-14 PROCEDURE — 64484 NJX AA&/STRD TFRM EPI L/S EA: CPT | Mod: LT | Performed by: PAIN MEDICINE

## 2023-07-14 PROCEDURE — 64483 NJX AA&/STRD TFRM EPI L/S 1: CPT | Mod: LT | Performed by: PAIN MEDICINE

## 2023-07-14 RX ORDER — DEXAMETHASONE SODIUM PHOSPHATE 10 MG/ML
10 INJECTION, SOLUTION INTRAMUSCULAR; INTRAVENOUS ONCE
Status: COMPLETED | OUTPATIENT
Start: 2023-07-14 | End: 2023-07-14

## 2023-07-14 RX ADMIN — DEXAMETHASONE SODIUM PHOSPHATE 10 MG: 10 INJECTION, SOLUTION INTRAMUSCULAR; INTRAVENOUS at 09:12

## 2023-07-14 ASSESSMENT — PAIN SCALES - GENERAL
PAINLEVEL: MILD PAIN (3)
PAINLEVEL: MODERATE PAIN (4)

## 2023-07-14 NOTE — PATIENT INSTRUCTIONS
[facility]   Procedure Discharge Instructions    Today you saw:    Dr. Donnell Yost          You had an:  Epidural steroid injection   -lumbar     Medications used:  Lidocaine   Bupivacaine   Dexamethasone Omnipaque          Be cautious when walking. Numbness and/or weakness in the lower extremities may occur for up to 6-8 hours after the procedure due to effect of the local anesthetic  Do not drive for 6 hours. The effect of the local anesthetic could slow your reflexes.   You may resume your regular activities after 24 hours  Avoid strenuous activity for the first 24 hours  You may shower, however avoid swimming, tub baths or hot tubs for 24 hours following your procedure  You may have a mild to moderate increase in pain for several days following the injection.  It may take up to 14 days for the steroid medication to start working although you may feel the effect as early as a few days after the procedure.     You may use ice packs for 10-15 minutes, 3 to 4 times a day at the injection site for comfort  Do not use heat to painful areas for 6 to 8 hours. This will give the local anesthetic time to wear off and prevent you from accidentally burning your skin.   Unless you have been directed to avoid the use of anti-inflammatory medications (NSAIDS), you may use medications such as ibuprofen, Aleve or Tylenol for pain control if needed.   If you were fasting, you may resume your normal diet and medications after the procedure  If you have diabetes, check your blood sugar more frequently than usual as your blood sugar may be higher than normal for 10-14 days following a steroid injection. Contact your doctor who manages your diabetes if your blood sugar is higher than usual  Possible side effects of steroids that you may experience include flushing, elevated blood pressure, increased appetite, mild headaches and restlessness.  All of these symptoms will get better with time.  If you  experience any of the following, call the Pain Clinic during work hours (Mon-Friday 8-4:30 pm) at [phone] or the Provider Line after hours at [phone]:  -Fever over 100 degree F  -Swelling, bleeding, redness, drainage, warmth at the injection site  -Progressive weakness or numbness in your legs or arms  -Loss of bowel or bladder function  -Unusual headache that is not relieved by Tylenol or other pain reliever  -Unusual new onset of pain that is not improving

## 2023-07-14 NOTE — NURSING NOTE
Discharge Information    IV Discontiued Time:  NA    Amount of Fluid Infused:  NA    Discharge Criteria = When patient returns to baseline or as per MD order    Consciousness:  Pt is fully awake    Circulation:  BP +/- 20% of pre-procedure level    Respiration:  Patient is able to breathe deeply    O2 Sat:  Patient is able to maintain O2 Sat >92% on room air    Activity:  Moves 4 extremities on command    Ambulation:  Patient is able to stand and walk or stand and pivot into wheelchair    Dressing:  Clean/dry or No Dressing    Notes:   Discharge instructions and AVS given to patient    Patient meets criteria for discharge?  YES    Admitted to PCU?  No    Responsible adult present to accompany patient home?  Yes    Signature/Title:    rene monet RN  RN Care Coordinator  [facility]

## 2023-07-14 NOTE — PROGRESS NOTES
Pre procedure Diagnosis: lumbar radiculopathy, lumbar degenerative disc disease   Post procedure Diagnosis: Same  Procedure performed: lumbar transforaminal epidural steroid injection at left L1-2, L2-3, fluoroscopically guided, contrast controlled  Anesthesia: none  Complications: none  Operators: Donnell Yost MD     Indications:   Pancho Najera Jr. is a 78 year old male.  They have a history of left lbp rad to his groin ant thigh.  Exam shows neg slump antalgic gait and they have tried conservative treatment including meds/pt.    MRI reviewed  Options/alternatives, benefits and risks were discussed with the patient including bleeding, infection, tissue trauma, numbness, weakness, paralysis, spinal cord injury, radiation exposure, headache and reaction to medications. Questions were answered to his satisfaction and he agrees to proceed. Voluntary informed consent was obtained and signed.     Vitals were reviewed: Yes  BP (!) 150/85   Pulse 72   SpO2 94%   Allergies were reviewed:  Yes   Medications were reviewed:  Yes   Pre-procedure pain score: 3/10    Procedure:  After getting informed consent, patient was brought into the procedure suite and was placed in a prone position on the procedure table.   A Pause for the Cause was performed.  Patient was prepped and draped in sterile fashion.     After identifying the left L1-2, L2-3 neuroforamen, the C-arm was rotated to a left lateral oblique angle.  A total of 4ml of Lidocaine 1% was used to anesthetize the skin and the needle track at a skin entry site coaxial with the fluoroscopy beam, and overriding the superior aspect of the neuroforamen.  A 22 gauge 3.5 inch spinal needle was advanced under intermittent fluoroscopy until it entered the foramen superiorly.    The position was then inspected from anteroposterior and lateral views, and the needle adjusted appropriately.  A total of 1ml of Omnipaque-300 was injected, confirming appropriate position,  with spread into the nerve root sheath and the epidural space, with no intravascular uptake. 9ml was wasted    Then, after repeated negative aspiration, a combination of Decadron 10 mg, 0.25% bupivacaine 2 ml, diluted with 3ml of normal saline was injected.     Hemostasis was achieved, the area was cleaned, and bandaids were placed when appropriate.  The patient tolerated the procedure well, and was taken to the recovery room.    Images were saved to PACS.    Post-procedure pain score: 3/10  Follow-up includes:   -f/u phone call in one week  -f/u with referring provider    Donnell Yost MD  [facility]

## 2023-07-14 NOTE — NURSING NOTE
Pre-procedure Intake  If YES to any questions or NO to having a   Please complete laminated checklist and leave on the computer keyboard for Provider, verbally inform provider if able.    For SCS Trial, RFA's or any sedation procedure:  Have you been fasting? NA    If yes, for how long? NA    Are you taking any any blood thinners such as Coumadin, Warfarin, Jantoven, Pradaxa Xarelto, Eliquis, Edoxaban, Enoxaparin, Lovenox, Heparin, Arixtra, Fondaparinux, or Fragmin? OR Antiplatelet medication such as Plavix, Brilinta, or Effient?   No     If yes, when did you take your last dose? NA    Do you take aspirin?  Yes -   ASA    If cervical procedure, have you held aspirin for 6 days?   NA    Do you have any allergies to contrast dye, iodine, steroid and/or numbing medications?  NO    Are you currently taking antibiotics or have an active infection?  NO    Have you had a fever/elevated temperature within the past week? NO    Are you currently taking oral steroids? NO    Do you have a ? Yes    Are you pregnant or breastfeeding?  Not Applicable    Have you received the COVID-19 vaccine? NA    If yes, was it your 1st, 2nd or only dose needed? NA    Date of most recent vaccine: NA    Notify provider and RNs if systolic BP >170, diastolic BP >100, P >100 or O2 sats < 90%      Chelsey Jo CMA (AAMA)

## 2023-07-27 ENCOUNTER — TELEPHONE (OUTPATIENT)
Dept: PALLIATIVE MEDICINE | Facility: CLINIC | Age: 78
End: 2023-07-27

## 2023-07-27 ENCOUNTER — HOSPITAL ENCOUNTER (EMERGENCY)
Facility: CLINIC | Age: 78
Discharge: HOME | End: 2023-07-27
Attending: FAMILY MEDICINE | Admitting: FAMILY MEDICINE
Payer: COMMERCIAL

## 2023-07-27 ENCOUNTER — APPOINTMENT (OUTPATIENT)
Dept: MRI IMAGING | Facility: CLINIC | Age: 78
End: 2023-07-27
Attending: FAMILY MEDICINE
Payer: COMMERCIAL

## 2023-07-27 ENCOUNTER — NURSE TRIAGE (OUTPATIENT)
Dept: FAMILY MEDICINE | Facility: CLINIC | Age: 78
End: 2023-07-27
Payer: COMMERCIAL

## 2023-07-27 VITALS
DIASTOLIC BLOOD PRESSURE: 91 MMHG | HEART RATE: 78 BPM | TEMPERATURE: 97.7 F | RESPIRATION RATE: 16 BRPM | HEIGHT: 67 IN | WEIGHT: 220 LBS | OXYGEN SATURATION: 96 % | SYSTOLIC BLOOD PRESSURE: 155 MMHG | BODY MASS INDEX: 34.53 KG/M2

## 2023-07-27 DIAGNOSIS — K59.00 CONSTIPATION, UNSPECIFIED CONSTIPATION TYPE: ICD-10-CM

## 2023-07-27 DIAGNOSIS — M54.16 LUMBAR RADICULOPATHY: ICD-10-CM

## 2023-07-27 PROBLEM — M48.061 SPINAL STENOSIS OF LUMBAR REGION WITH RADICULOPATHY: Status: ACTIVE | Noted: 2022-07-06

## 2023-07-27 LAB
ALBUMIN SERPL BCG-MCNC: 4.4 G/DL (ref 3.5–5.2)
ALP SERPL-CCNC: 104 U/L (ref 40–129)
ALT SERPL W P-5'-P-CCNC: 30 U/L (ref 0–70)
ANION GAP SERPL CALCULATED.3IONS-SCNC: 12 MMOL/L (ref 7–15)
AST SERPL W P-5'-P-CCNC: 27 U/L (ref 0–45)
BASOPHILS # BLD AUTO: 0 10E3/UL (ref 0–0.2)
BASOPHILS NFR BLD AUTO: 1 %
BILIRUB SERPL-MCNC: 0.3 MG/DL
BUN SERPL-MCNC: 14.9 MG/DL (ref 8–23)
CALCIUM SERPL-MCNC: 10.3 MG/DL (ref 8.8–10.2)
CHLORIDE SERPL-SCNC: 101 MMOL/L (ref 98–107)
CREAT SERPL-MCNC: 1.07 MG/DL (ref 0.67–1.17)
DEPRECATED HCO3 PLAS-SCNC: 22 MMOL/L (ref 22–29)
EOSINOPHIL # BLD AUTO: 0.4 10E3/UL (ref 0–0.7)
EOSINOPHIL NFR BLD AUTO: 5 %
ERYTHROCYTE [DISTWIDTH] IN BLOOD BY AUTOMATED COUNT: 12.5 % (ref 10–15)
GFR SERPL CREATININE-BSD FRML MDRD: 71 ML/MIN/1.73M2
GLUCOSE SERPL-MCNC: 149 MG/DL (ref 70–99)
HCT VFR BLD AUTO: 42.3 % (ref 40–53)
HGB BLD-MCNC: 14.6 G/DL (ref 13.3–17.7)
HOLD SPECIMEN: NORMAL
HOLD SPECIMEN: NORMAL
IMM GRANULOCYTES # BLD: 0 10E3/UL
IMM GRANULOCYTES NFR BLD: 0 %
LYMPHOCYTES # BLD AUTO: 2.4 10E3/UL (ref 0.8–5.3)
LYMPHOCYTES NFR BLD AUTO: 31 %
MCH RBC QN AUTO: 31.3 PG (ref 26.5–33)
MCHC RBC AUTO-ENTMCNC: 34.5 G/DL (ref 31.5–36.5)
MCV RBC AUTO: 91 FL (ref 78–100)
MONOCYTES # BLD AUTO: 0.7 10E3/UL (ref 0–1.3)
MONOCYTES NFR BLD AUTO: 9 %
NEUTROPHILS # BLD AUTO: 4.3 10E3/UL (ref 1.6–8.3)
NEUTROPHILS NFR BLD AUTO: 54 %
NRBC # BLD AUTO: 0 10E3/UL
NRBC BLD AUTO-RTO: 0 /100
PLATELET # BLD AUTO: 135 10E3/UL (ref 150–450)
POTASSIUM SERPL-SCNC: 4.3 MMOL/L (ref 3.4–5.3)
PROT SERPL-MCNC: 7.6 G/DL (ref 6.4–8.3)
RBC # BLD AUTO: 4.66 10E6/UL (ref 4.4–5.9)
SODIUM SERPL-SCNC: 135 MMOL/L (ref 136–145)
WBC # BLD AUTO: 7.9 10E3/UL (ref 4–11)

## 2023-07-27 PROCEDURE — 72158 MRI LUMBAR SPINE W/O & W/DYE: CPT

## 2023-07-27 PROCEDURE — 36415 COLL VENOUS BLD VENIPUNCTURE: CPT | Performed by: FAMILY MEDICINE

## 2023-07-27 PROCEDURE — 255N000002 HC RX 255 OP 636: Performed by: FAMILY MEDICINE

## 2023-07-27 PROCEDURE — 80053 COMPREHEN METABOLIC PANEL: CPT | Performed by: FAMILY MEDICINE

## 2023-07-27 PROCEDURE — A9585 GADOBUTROL INJECTION: HCPCS | Performed by: FAMILY MEDICINE

## 2023-07-27 PROCEDURE — 99285 EMERGENCY DEPT VISIT HI MDM: CPT | Mod: 25 | Performed by: FAMILY MEDICINE

## 2023-07-27 PROCEDURE — 99284 EMERGENCY DEPT VISIT MOD MDM: CPT | Performed by: FAMILY MEDICINE

## 2023-07-27 PROCEDURE — 85014 HEMATOCRIT: CPT | Performed by: FAMILY MEDICINE

## 2023-07-27 RX ORDER — GADOBUTROL 604.72 MG/ML
9 INJECTION INTRAVENOUS ONCE
Status: COMPLETED | OUTPATIENT
Start: 2023-07-27 | End: 2023-07-27

## 2023-07-27 RX ADMIN — GADOBUTROL 9 ML: 604.72 INJECTION INTRAVENOUS at 21:18

## 2023-07-27 ASSESSMENT — ACTIVITIES OF DAILY LIVING (ADL)
ADLS_ACUITY_SCORE: 35
ADLS_ACUITY_SCORE: 35

## 2023-07-27 NOTE — ED TRIAGE NOTES
Pt reports that Miralax and prune juice last bowel movement was today but states he is unable to have a bowel movement with them.     Steroid injection 7/14/23 and since then has had 5 bowel movements, has been feeling numb, no urge to have bowel movement.

## 2023-07-27 NOTE — TELEPHONE ENCOUNTER
Patient is scheduled with RN tomorrow to discuss cortisone injection and not having stools.    Left message for the patient to call back to triage his symptoms.      JACOB Palm

## 2023-07-27 NOTE — TELEPHONE ENCOUNTER
"Reason for Disposition   Patient sounds very sick or weak to the triager    Answer Assessment - Initial Assessment Questions  1. STOOL PATTERN OR FREQUENCY: \"How often do you have a bowel movement (BM)?\"  (Normal range: 3 times a day to every 3 days)  \"When was your last BM?\"        Patient reports abnormal bowel pattern since cortisone injection 2 weeks ago, states he was having normal bowel movements then after the cortisone injection he has been having constipation and lack of urge to go have a bowel movement.   2. STRAINING: \"Do you have to strain to have a BM?\"       Yes he has to strain to have a bowel movement but lacks the sensation to have one since cortisone injection.  3. RECTAL PAIN: \"Does your rectum hurt when the stool comes out?\" If Yes, ask: \"Do you have hemorrhoids? How bad is the pain?\"  (Scale 1-10; or mild, moderate, severe)      Did not report  4. STOOL COMPOSITION: \"Are the stools hard?\"       Reports since cortisone injection he has had 5 small liquid black stools, not formed.  5. BLOOD ON STOOLS: \"Has there been any blood on the toilet tissue or on the surface of the BM?\" If Yes, ask: \"When was the last time?\"       Denies blood in stool  6. CHRONIC CONSTIPATION: \"Is this a new problem for you?\"  If no, ask: \"How long have you had this problem?\" (days, weeks, months)       New problem since cortisone injection  7. CHANGES IN DIET OR HYDRATION: \"Have there been any recent changes in your diet?\" \"How much fluids are you drinking on a daily basis?\"  \"How much have you had to drink today?\"      No change, patient states he eats watermelon and drinks plenty of water  8. MEDICATIONS: \"Have you been taking any new medications?\" \"Are you taking any narcotic pain medications?\" (e.g., Vicodin, Percocet, morphine, Dilaudid)      denies  9. LAXATIVES: \"Have you been using any stool softeners, laxatives, or enemas?\"  If yes, ask \"What, how often, and when was the last time?\"      Reports he can not have " "any bowel movement without laxative and miralax with prune juice which he took today and did have small black liquid stool  10. ACTIVITY:  \"How much walking do you do every day?\"  \"Has your activity level decreased in the past week?\"         Did not report he has been walking less  11. CAUSE: \"What do you think is causing the constipation?\"         Patient thinks it is related to cortisone injection  12. OTHER SYMPTOMS: \"Do you have any other symptoms?\" (e.g., abdominal pain, bloating, fever, vomiting)        Stomach bloating and fullness, some intermittent left sided pain, denies fever or vomiting  13. MEDICAL HISTORY: \"Do you have a history of hemorrhoids, rectal fissures, or rectal surgery or rectal abscess?\"          Did not report  14. PREGNANCY: \"Is there any chance you are pregnant?\" \"When was your last menstrual period?\"        na    Protocols used: Constipation-A-OH      Patient is advised ER to determine causes for inability to feel sensation for a bowel movement and to evaluate constipation for past 2 weeks since cortisone injection/ small liquid black stool/stomach bloating. Patient is agreeable to this plan.    Will send as update only to PCP.      JACOB Palm    "

## 2023-07-27 NOTE — TELEPHONE ENCOUNTER
[facility] Call Center    Phone Message    May a detailed message be left on voicemail: yes     Reason for Call: Pt is requesting a call back from a nurse to discuss complications he's having since his shot.  He said that he has not had a bowel movement since the shot.  Please call him back to discuss.  Thanks.

## 2023-07-27 NOTE — ED PROVIDER NOTES
"Pancho Najera Jr. Is a 78 year old male who reports he had an epidural injection on 7/14 for his back pain which resulted in worsening back pain and now with lack of sensation for bowel movements.  He had pain down the left leg with his typical back pain but now he has pain down both legs and they feel like \"rubber.\"  He has attempted to follow-up with his surgeon but is unable to contact them.  He stopped in the clinic today and was advised to come to the ER versus urgent care.  Based on his symptomology I assessed that he would require more of a work-up than what the urgent care can provide with repeat MRI potentially due to red flag symptoms of saddle paresthesias, leg weakness.  Patient was agreeable to being seen in the ER.  Discussed case with Dr. Bullock.     Emmy Singh PA-C  07/27/23 [id number]    "

## 2023-07-27 NOTE — TELEPHONE ENCOUNTER
7/14/2023 Procedure performed: lumbar transforaminal epidural steroid injection at left L1-2, L2-3     Attempted to contact patient regarding report that he has not had BM since date of injection.   Writer LVM noting that with information left in message client should be seen in UC or ED to address this.  Writer notes that patient may return call to clinic at [phone].    Routing to provider as notification.     Abi Ruiz RN  [facility]  [phone]

## 2023-07-28 NOTE — DISCHARGE INSTRUCTIONS
I placed a referral to the spine clinic.  You will be contacted to schedule follow-up.  Continue regular bowel regimen of stool softener in the form of docusate 100 mg twice daily then add MiraLAX if needed for additional with bowel movements.  Today's test results do not show signs of spinal cord injury causing your bowel movement difficulties.

## 2023-07-28 NOTE — TELEPHONE ENCOUNTER
Chart reviewed - does he have ongoing concerns for constipation, or is this new for him?  If this is new, I recommend he go in for further evaluation to UC or ED if having other red flag symptoms.    Tish Zuniga DNP, APRN, AGNP-C  [facility]

## 2023-07-28 NOTE — ED PROVIDER NOTES
History     Chief Complaint   Patient presents with    Constipation     HPI    Pancho Najera Jr. is a 78 year old male who presents with concerns for an injury to a nerve in his spine from an epidural steroid injection on July 14 done for chronic low back pain.  He said that ever since the injection which was an L1 1 L2 transforaminal steroid injection for chronic low back pain he has not been able to have a bowel movement without taking MiraLAX, prune juice, and dietary manipulation.  He also says that he does not have any urge to defecate.  He says that it is difficult for him to bear down because of the pain in his back.  He does not have any perineal sensation change.  He is not having any urinary retention or incontinence and he has not had any fecal incontinence.  A year ago he had back surgery and he says that the surgeon told him that it did not go well and that he might need additional surgery.  Recently consult a different surgeon and was offered a repeat surgery but was not happy with the consultation and has not pursued this.  He is not having fevers.  His back pain has not changed substantially in the last couple of years and not since the steroid injection.  He did have a bowel movement this morning but says he is mostly concerned because he does not have any urge to defecate.    Allergies:  Allergies   Allergen Reactions    Pneumococcal Vaccine Other (See Comments)     swelling  swelling      Sulfa Antibiotics Hives    Gabapentin Hives    Lisinopril Cough    Metformin      Felt unwell, did not tolerate        Problem List:    Patient Active Problem List    Diagnosis Date Noted    History of microdiscectomy 10/18/2022     Priority: Medium    Left-sided low back pain with left-sided sciatica, unspecified chronicity 10/17/2022     Priority: Medium    Spinal stenosis of lumbar region with radiculopathy 07/06/2022     Priority: Medium     Formatting of this note might be different from the  original. Treated surgically with L2-3 decompression and left discectomy by Dr. Johnston on 7/6/2022.      Osteopenia of lumbar spine 05/14/2021     Priority: Medium     Formatting of this note might be different from the original.  2016 DEXA.      Stage 3a chronic kidney disease (H) 09/20/2020     Priority: Medium     Formatting of this note might be different from the original.  Nl Renal US 2019      Chronic left-sided low back pain without sciatica 10/05/2018     Priority: Medium    Gastroesophageal reflux disease with esophagitis 10/05/2018     Priority: Medium    Plantar fasciitis, bilateral 10/05/2018     Priority: Medium    BMI 32.0-32.9,adult 10/05/2018     Priority: Medium    Hearing loss, bilateral 10/05/2018     Priority: Medium    Vitamin D deficiency 10/05/2018     Priority: Medium    Chronic seasonal allergic rhinitis 06/22/2018     Priority: Medium    Mixed hyperlipidemia 06/22/2018     Priority: Medium    Type 2 diabetes mellitus with diabetic polyneuropathy, without long-term current use of insulin (H) 06/22/2018     Priority: Medium    Balance problems 06/22/2018     Priority: Medium    Asbestos exposure 06/22/2018     Priority: Medium    Urinary incontinence, unspecified type 06/22/2018     Priority: Medium    Polyneuropathy 02/20/2017     Priority: Medium    Obstructive sleep apnea syndrome 03/25/2016     Priority: Medium    Pleural plaque 03/25/2016     Priority: Medium    Multiple pulmonary nodules 03/25/2016     Priority: Medium    Chest pain 10/22/2010     Priority: Medium    History of respiratory system disease 10/22/2010     Priority: Medium    Ventricular premature beats 10/22/2010     Priority: Medium    Ventricular bigeminy 10/22/2010     Priority: Medium    Atrophic gastritis 01/01/2010     Priority: Medium    Anxiety state 04/17/2009     Priority: Medium    Depression 04/17/2009     Priority: Medium        Past Medical History:    No past medical history on file.    Past Surgical  "History:    No past surgical history on file.    Family History:    Family History   Problem Relation Age of Onset    Diabetes Mother     Coronary Artery Disease Mother     Uterine Cancer Mother     Lung Cancer Father     Diabetes Father     Asthma Sister     Diabetes Sister     Other - See Comments Sister         in coma for 2 years after asthma attack       Social History:  Marital Status:   [2]  Social History     Tobacco Use    Smoking status: Never    Smokeless tobacco: Never   Vaping Use    Vaping Use: Never used   Substance Use Topics    Alcohol use: Not Currently    Drug use: Never        Medications:    aspirin (ASA) 81 MG EC tablet  atorvastatin (LIPITOR) 10 MG tablet  blood glucose (FREESTYLE TEST STRIPS) test strip  blood glucose (NO BRAND SPECIFIED) lancets standard  blood glucose (NO BRAND SPECIFIED) test strip  blood glucose monitoring (ONE TOUCH ULTRA 2) meter device kit  blood glucose monitoring (SOFTCLIX) lancets  clonazePAM (KLONOPIN) 0.5 MG tablet  cycloSPORINE (RESTASIS) 0.05 % ophthalmic emulsion  glimepiride (AMARYL) 4 MG tablet  ibuprofen (ADVIL/MOTRIN) 200 MG capsule          Review of Systems  All other systems are reviewed and are negative    Physical Exam   BP: (!) 146/94  Pulse: 77  Temp: 97.7  F (36.5  C)  Resp: 16  Height: 170.2 cm (5' 7\")  Weight: 99.8 kg (220 lb)  SpO2: 95 %      Physical Exam    Nursing note and vitals were reviewed.  Constitutional: Awake and alert, overweight appearing 78-year-old in no apparent discomfort, who does not appear acutely ill, and who answers questions appropriately and cooperates with examination.  HEENT: EOMI.   Neck: Freely mobile.  Pulmonary/Chest: Breathing is unlabored.    Abdomen: Soft, nontender, no HSM or masses rebound or guarding.  Musculoskeletal: Extremities are warm and well-perfused and without edema  Neurological: Alert, oriented, thought content logical, coherent   Skin: Warm, dry, no rashes.  Rectal: Normal perineal sensation. "  Normal rectal tone.  No masses or tenderness on rectal examination.  No stool in the rectal vault..    ED Course                 Procedures              Critical Care time:  none               Results for orders placed or performed during the hospital encounter of 07/27/23 (from the past 24 hour(s))   [place] Draw    Narrative    The following orders were created for panel order [place] Draw.  Procedure                               Abnormality         Status                     ---------                               -----------         ------                     Extra Blue Top Tube[[id number]]                                                         Extra Green Top (Lithium...[[id number]]                      Final result               Extra Purple Top Tube[[id number]]                            Final result                 Please view results for these tests on the individual orders.   Extra Green Top (Lithium Heparin) Tube   Result Value Ref Range    Hold Specimen JIC    Extra Purple Top Tube   Result Value Ref Range    Hold Specimen JIC    CBC with platelets, differential    Narrative    The following orders were created for panel order CBC with platelets, differential.  Procedure                               Abnormality         Status                     ---------                               -----------         ------                     CBC with platelets and d...[[id number]]  Abnormal            Final result                 Please view results for these tests on the individual orders.   Comprehensive metabolic panel   Result Value Ref Range    Sodium 135 (L) 136 - 145 mmol/L    Potassium 4.3 3.4 - 5.3 mmol/L    Chloride 101 98 - 107 mmol/L    Carbon Dioxide (CO2) 22 22 - 29 mmol/L    Anion Gap 12 7 - 15 mmol/L    Urea Nitrogen 14.9 8.0 - 23.0 mg/dL    Creatinine 1.07 0.67 - 1.17 mg/dL    Calcium 10.3 (H) 8.8 - 10.2 mg/dL    Glucose 149 (H) 70 - 99 mg/dL    Alkaline Phosphatase 104 40 - 129 U/L    AST 27 0 -  45 U/L    ALT 30 0 - 70 U/L    Protein Total 7.6 6.4 - 8.3 g/dL    Albumin 4.4 3.5 - 5.2 g/dL    Bilirubin Total 0.3 <=1.2 mg/dL    GFR Estimate 71 >60 mL/min/1.73m2   CBC with platelets and differential   Result Value Ref Range    WBC Count 7.9 4.0 - 11.0 10e3/uL    RBC Count 4.66 4.40 - 5.90 10e6/uL    Hemoglobin 14.6 13.3 - 17.7 g/dL    Hematocrit 42.3 40.0 - 53.0 %    MCV 91 78 - 100 fL    MCH 31.3 26.5 - 33.0 pg    MCHC 34.5 31.5 - 36.5 g/dL    RDW 12.5 10.0 - 15.0 %    Platelet Count 135 (L) 150 - 450 10e3/uL    % Neutrophils 54 %    % Lymphocytes 31 %    % Monocytes 9 %    % Eosinophils 5 %    % Basophils 1 %    % Immature Granulocytes 0 %    NRBCs per 100 WBC 0 <1 /100    Absolute Neutrophils 4.3 1.6 - 8.3 10e3/uL    Absolute Lymphocytes 2.4 0.8 - 5.3 10e3/uL    Absolute Monocytes 0.7 0.0 - 1.3 10e3/uL    Absolute Eosinophils 0.4 0.0 - 0.7 10e3/uL    Absolute Basophils 0.0 0.0 - 0.2 10e3/uL    Absolute Immature Granulocytes 0.0 <=0.4 10e3/uL    Absolute NRBCs 0.0 10e3/uL   Lumbar spine MRI w & w/o contrast - surgery <10yrs    Narrative    For Patients: As a result of the 21st Century Cures Act, medical imaging exams and procedure reports are released immediately into your electronic medical record. You may view this report before your referring provider. If you have questions, please   contact your health care provider.    EXAM: MR LUMBAR SPINE W/O and W CONTRAST  LOCATION: [facility]  DATE/TIME: 7/27/2023 9:55 PM CDT    INDICATION: chronic low back pain; no urge to defecate  COMPARISON: MRI 07/08/2022  TECHNIQUE: Routine lumbar spine MRI without and with IV contrast.    FINDINGS:   Nomenclature based on 5 lumbar type vertebral bodies.   Postsurgical changes of discectomy, left laminectomy and medial facetectomy at L2-L3. No instrumentation.  Mild dextrocurvature centered at L3. Mild retrolisthesis at L2-3, 4 mm.  Normal vertebral body heights.   Prominent Modic type I (edema)  reactive changes at L2-L3, extends into the left pedicles and surinder facet aspect of L2 and L3 (significantly more pronounced than on prior). Minor Modic type I changes posteriorly at L1-L2.  Normal distal spinal cord and cauda equina with conus medullaris at L1-2.   No extra spinal abnormalities. Unremarkable visualized bony pelvis.    T12-L1: Slight loss of disc height. Desiccated disc with shallow disc bulge. Normal facet joints. No recess stenosis. No central spinal stenosis, no right foramen stenosis, no left foramen stenosis.    L1-L2: Slight loss of disc height. Desiccated disc with mild generalized disc bulge. Mild bilateral facet hypertrophy. Mild right recess stenosis. Mild central spinal stenosis, mild right foramen stenosis, no left foramen stenosis.    L2-L3: 4 mm retrolisthesis. Near complete loss of disc height. Severely degenerated disc with residual left subarticular disc extrusion with caudal migration, less pronounced than on prior. Moderate left, mild right facet hypertrophy. Severe left recess   stenosis (similar to prior). Mild to moderate central spinal stenosis, mild right foramen stenosis, severe left foramen stenosis (more pronounced than on prior).    L3-L4: Normal height of disc. Desiccated disc with shallow disc bulge. Mild bilateral facet hypertrophy. Mild left recess stenosis. No central spinal stenosis, mild right foramen stenosis, moderate left foramen stenosis (more pronounced than on prior).    L4-L5: Normal height of disc. Desiccated disc with shallow disc bulge. Moderate to severe bilateral facet hypertrophy. No recess stenosis. No central spinal stenosis, moderate right foramen stenosis (more pronounced than on prior), mild to moderate left   foramen stenosis.    L5-S1: Slight loss of disc height. Desiccated disc with mild generalized disc bulge. Mild facet arthrosis. No recess stenosis. No central spinal stenosis, mild right foramen stenosis, no left foramen stenosis.       Impression    IMPRESSION:  1.  No acute findings.   2.  Prominent Modic type I changes at L2-L3 with significant edema (significantly more pronounced than on prior) with contrast enhancement at this level. Residual caudally migrated left subarticular disc extrusion is less pronounced than on prior. Mild   to moderate central stenosis at this level. Severe left recess stenosis at this level, similar to prior.  3.  Severe left foraminal stenosis at L2-L3. Moderate left foraminal stenosis at L3-L4.  4.  Moderate right foraminal stenosis at L4-L5.           Medications   gadobutrol (GADAVIST) injection 9 mL (9 mLs Intravenous $Given 7/27/23 2118)       Assessments & Plan (with Medical Decision Making)     78-year-old male presented with constipation.  He is concerned that his epidural steroid injection has injured the nerves in his back preventing him from having bowel movements.  He is not having any difficulty with incontinence or retention of urine.  He has not had any incontinence of stool.  He has not had any perineal sensation change.  He had good sphincter tone on rectal examination and normal perineal sensation on pinprick testing.  MRI of the lumbar spine showed only mild to moderate spinal stenosis not convincing for cauda equina syndrome.  He has no evidence on MRI or by history for disc infection, epidural abscess or hematoma or other worrisome cause for his symptoms.  He does have quite significant radiculopathy at multiple levels as in the MRI results above significantly changed from his MRI a year ago.  He has been frustrated with his previous spine surgery and with his second consult with a different spine surgeon.  I have referred him to the spine  for follow-up and recommended a bowel regimen of stool softeners.    I have reviewed the nursing notes.    I have reviewed the findings, diagnosis, plan and need for follow up with the patient.         New Prescriptions    No medications on file        Final diagnoses:   Lumbar radiculopathy   Constipation, unspecified constipation type       7/27/2023   [facility] EMERGENCY DEPT       Mendel Sweet MD  07/27/23 4887

## 2023-07-31 NOTE — TELEPHONE ENCOUNTER
"Denies any change with constipation.    New onset symptom. Last normal BM was day of injection.     Last BM was this morning \"it was about 1/2 inch in diameter and 2 inches long\"     Right after the shot I didn't go for 4 days and then after the Miralax it looks like someone threw up in the toilet \"it's dark liquid\"  Patient denies having Bms however he is able to verbalize information related to them.     Patient is having BMs, however he identifies he does not have any urge to defecate and this concerns him      Miralax and dulcolax currently being utilized by patient along with prune juice. Writer notes that Dr. Yost recommends that he utilize milk of magnesia and suppositories if needed.     Patient requests to know how long this symptom will last if related to injection.    Writer notes that Dr. Yost does not believe symptom is related, however if it were the maximum time frame expected would be 3-6 months.     Routing to provider to review.     Abi Ruiz RN  [facility]  [phone]    "

## 2023-08-01 NOTE — TELEPHONE ENCOUNTER
Contacted patient and notified of recommendation to be seen by PCP if symptoms persist.     Abi Ruiz RN  [facility]  [phone]

## 2023-08-01 NOTE — TELEPHONE ENCOUNTER
Chart reviewed - it appears he was evaluated in ED and urgent/emergent findings ruled out. I recommend he follow up with PCP, if he continues to have concerns for constipation, despite recommendations from Dr. Yost and ED provider.     Tish Zuniga, ADIA, APRN, AGNP-C  [facility]

## 2023-10-17 ENCOUNTER — TELEPHONE (OUTPATIENT)
Dept: OTOLARYNGOLOGY | Facility: CLINIC | Age: 78
End: 2023-10-17
Payer: COMMERCIAL

## 2023-10-17 NOTE — TELEPHONE ENCOUNTER
Pt walked into clinic to ask about his swollen jawline.  Had previous biopsy that was inconclusive.  Wants to know what, if anything can be done.  Antibiotics?

## 2023-10-17 NOTE — TELEPHONE ENCOUNTER
Spoke with patient.  He has had increased discomfort on the jaw that feels like a tonsil swelling up.  Asked for phone rx of antibiotic and or Pain med.    Review of EMR with patient that Dr Alonso has never seen him for Tonsil stones or tonsilitis, and has not seen for infection or provided pain pills to patient for the Tumor.    Pt confirms he does not believe this is  general increase in his Tumor and feels more like the previous issue of swollen tonsil and causing ear discomfort as well.    Advised Dr Alonso out of office x 2 weeks and therefore no appts available.    Recommended he reach out to his PCP and see if they have any appts to check if other medical issue or if it is sudden unexpected increase in Mass size.  Or if unable to wait for appt with them, he can consider Urgent care.    Recommended trying warm packs to that side of face and ear to see if gland drainage can be facilitated.    Madelaine HERRERA   [facility] RN

## 2023-10-17 NOTE — TELEPHONE ENCOUNTER
"Per 6/28/23     \"Given that we know that the mass is benign on needle biopsy, we can obtain another ultrasound in 12 months to follow size of the lesion.\"    _______________________________  Needs triage:  Call placed to inquire if significant increase in size? Other new symptoms?    Left message on answering machine for patient to call back.      Madelaine HERRERA   [facility] RN  "

## 2023-10-17 NOTE — CONFIDENTIAL NOTE
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  This would be a very difficult tumor to remove would almost certainly cause temporary facial nerve paresis and be at high risk for nerve injury given its appearance on imaging.  Given the patient's other health issues, I am not certain parotidectomy be the best choice for this patient.  The tumor in his right parotid gland is benign and is not causing any of his other headache symptoms or his neck and back issues.  I encouraged him to follow-up with his primary care provider for his neck and back issues.  I did place a referral to neurology for headache evaluation, but the family declined evaluation.\"  "

## 2023-10-18 ENCOUNTER — OFFICE VISIT (OUTPATIENT)
Dept: FAMILY MEDICINE | Facility: CLINIC | Age: 78
End: 2023-10-18
Payer: COMMERCIAL

## 2023-10-18 VITALS
DIASTOLIC BLOOD PRESSURE: 90 MMHG | TEMPERATURE: 97.6 F | HEART RATE: 68 BPM | BODY MASS INDEX: 33.74 KG/M2 | SYSTOLIC BLOOD PRESSURE: 152 MMHG | WEIGHT: 215 LBS | HEIGHT: 67 IN | OXYGEN SATURATION: 98 %

## 2023-10-18 DIAGNOSIS — I10 ESSENTIAL HYPERTENSION: ICD-10-CM

## 2023-10-18 DIAGNOSIS — J32.9 SINUSITIS, UNSPECIFIED CHRONICITY, UNSPECIFIED LOCATION: ICD-10-CM

## 2023-10-18 DIAGNOSIS — N18.31 STAGE 3A CHRONIC KIDNEY DISEASE (H): ICD-10-CM

## 2023-10-18 DIAGNOSIS — E11.42 TYPE 2 DIABETES MELLITUS WITH DIABETIC POLYNEUROPATHY, WITHOUT LONG-TERM CURRENT USE OF INSULIN (H): Primary | ICD-10-CM

## 2023-10-18 LAB
ANION GAP SERPL CALCULATED.3IONS-SCNC: 16 MMOL/L (ref 7–15)
BUN SERPL-MCNC: 16.3 MG/DL (ref 8–23)
CALCIUM SERPL-MCNC: 9.9 MG/DL (ref 8.8–10.2)
CHLORIDE SERPL-SCNC: 103 MMOL/L (ref 98–107)
CREAT SERPL-MCNC: 1.02 MG/DL (ref 0.67–1.17)
CREAT UR-MCNC: 109.5 MG/DL
DEPRECATED HCO3 PLAS-SCNC: 20 MMOL/L (ref 22–29)
EGFRCR SERPLBLD CKD-EPI 2021: 76 ML/MIN/1.73M2
GLUCOSE SERPL-MCNC: 169 MG/DL (ref 70–99)
HBA1C MFR BLD: 8 % (ref 0–5.6)
MICROALBUMIN UR-MCNC: <12 MG/L
MICROALBUMIN/CREAT UR: NORMAL MG/G{CREAT}
POTASSIUM SERPL-SCNC: 4.2 MMOL/L (ref 3.4–5.3)
SODIUM SERPL-SCNC: 139 MMOL/L (ref 135–145)

## 2023-10-18 PROCEDURE — 83036 HEMOGLOBIN GLYCOSYLATED A1C: CPT | Performed by: FAMILY MEDICINE

## 2023-10-18 PROCEDURE — 36415 COLL VENOUS BLD VENIPUNCTURE: CPT | Performed by: FAMILY MEDICINE

## 2023-10-18 PROCEDURE — 99214 OFFICE O/P EST MOD 30 MIN: CPT | Performed by: FAMILY MEDICINE

## 2023-10-18 PROCEDURE — 82043 UR ALBUMIN QUANTITATIVE: CPT | Performed by: FAMILY MEDICINE

## 2023-10-18 PROCEDURE — 82570 ASSAY OF URINE CREATININE: CPT | Performed by: FAMILY MEDICINE

## 2023-10-18 PROCEDURE — 80048 BASIC METABOLIC PNL TOTAL CA: CPT | Performed by: FAMILY MEDICINE

## 2023-10-18 RX ORDER — LISINOPRIL 5 MG/1
5 TABLET ORAL DAILY
Qty: 90 TABLET | Refills: 3 | Status: SHIPPED | OUTPATIENT
Start: 2023-10-18 | End: 2024-01-16

## 2023-10-18 ASSESSMENT — PAIN SCALES - GENERAL: PAINLEVEL: MILD PAIN (2)

## 2023-10-18 NOTE — LETTER
November 3, 2023      Pancho ABEL Reinaldo Jr.  [address]  [place] [address]        Dear ,    We are writing to inform you of your test results.    Your test results fall within the expected range(s) or remain unchanged from previous results.  Please continue with current treatment plan.    Resulted Orders   Hemoglobin A1c   Result Value Ref Range    Hemoglobin A1C 8.0 (H) 0.0 - 5.6 %      Comment:      Normal <5.7%   Prediabetes 5.7-6.4%    Diabetes 6.5% or higher     Note: Adopted from ADA consensus guidelines.   Basic metabolic panel  (Ca, Cl, CO2, Creat, Gluc, K, Na, BUN)   Result Value Ref Range    Sodium 139 135 - 145 mmol/L      Comment:      Reference intervals for this test were updated on 09/26/2023 to more accurately reflect our healthy population. There may be differences in the flagging of prior results with similar values performed with this method. Interpretation of those prior results can be made in the context of the updated reference intervals.     Potassium 4.2 3.4 - 5.3 mmol/L    Chloride 103 98 - 107 mmol/L    Carbon Dioxide (CO2) 20 (L) 22 - 29 mmol/L    Anion Gap 16 (H) 7 - 15 mmol/L    Urea Nitrogen 16.3 8.0 - 23.0 mg/dL    Creatinine 1.02 0.67 - 1.17 mg/dL    GFR Estimate 76 >60 mL/min/1.73m2    Calcium 9.9 8.8 - 10.2 mg/dL    Glucose 169 (H) 70 - 99 mg/dL   Lipid panel reflex to direct LDL Fasting   Result Value Ref Range    Cholesterol 138 <200 mg/dL    Triglycerides 78 <150 mg/dL    Direct Measure HDL 45 >=40 mg/dL    LDL Cholesterol Calculated 77 <=100 mg/dL    Non HDL Cholesterol 93 <130 mg/dL    Narrative    Cholesterol  Desirable:  <200 mg/dL    Triglycerides  Normal:  Less than 150 mg/dL  Borderline High:  150-199 mg/dL  High:  200-499 mg/dL  Very High:  Greater than or equal to 500 mg/dL    Direct Measure HDL  Female:  Greater than or equal to 50 mg/dL   Male:  Greater than or equal to 40 mg/dL    LDL Cholesterol  Desirable:  <100mg/dL  Above Desirable:  100-129 mg/dL    Borderline High:  130-159 mg/dL   High:  160-189 mg/dL   Very High:  >= 190 mg/dL    Non HDL Cholesterol  Desirable:  130 mg/dL  Above Desirable:  130-159 mg/dL  Borderline High:  160-189 mg/dL  High:  190-219 mg/dL  Very High:  Greater than or equal to 220 mg/dL   Albumin Random Urine Quantitative with Creat Ratio   Result Value Ref Range    Creatinine Urine mg/dL 109.5 mg/dL      Comment:      The reference ranges have not been established in urine creatinine. The results should be integrated into the clinical context for interpretation.    Albumin Urine mg/L <12.0 mg/L      Comment:      The reference ranges have not been established in urine albumin. The results should be integrated into the clinical context for interpretation.    Albumin Urine mg/g Cr        Comment:      Unable to calculate, urine albumin and/or urine creatinine is outside detectable limits.  Microalbuminuria is defined as an albumin:creatinine ratio of 17 to 299 for males and 25 to 299 for females. A ratio of albumin:creatinine of 300 or higher is indicative of overt proteinuria.  Due to biologic variability, positive results should be confirmed by a second, first-morning random or 24-hour timed urine specimen. If there is discrepancy, a third specimen is recommended. When 2 out of 3 results are in the microalbuminuria range, this is evidence for incipient nephropathy and warrants increased efforts at glucose control, blood pressure control, and institution of therapy with an angiotensin-converting-enzyme (ACE) inhibitor (if the patient can tolerate it).         If you have any questions or concerns, please call the clinic at the number listed above.       Sincerely,      Jorge Alberto Thompson, DO

## 2023-10-18 NOTE — PATIENT INSTRUCTIONS
Start on Lisinopril 5 mg daily.     Lab work today    Start on Augmentin twice daily for 7 days.     Follow up with Nurse BP check in 2 weeks and recheck of labs in 2 weeks.

## 2023-10-18 NOTE — PROGRESS NOTES
Assessment & Plan     Type 2 diabetes mellitus with diabetic polyneuropathy, without long-term current use of insulin (H)  Stage 3a chronic kidney disease (H)  Essential hypertension  glimepiride 4 mg twice daily.   Atorvastatin 10 mg daily  Aspirin 81 mg daily  A1c of 7.0 on 3/15/2023  -- Overdue for labs. Obtain today.   -- BP is elevated. Start on lisinopril 5 mg daily.   -- follow up with nurse BP visit in 2 weeks, recheck of BMP in 2 weeks.   - Hemoglobin A1c  - Basic metabolic panel  (Ca, Cl, CO2, Creat, Gluc, K, Na, BUN)  - Lipid panel reflex to direct LDL Fasting  - Albumin Random Urine Quantitative with Creat Ratio  - lisinopril (ZESTRIL) 5 MG tablet  Dispense: 90 tablet; Refill: 3    Sinusitis, unspecified chronicity, unspecified location  Ongoing symptoms. Treat with augmentin twice daily for 7 days. Follow up with ENT if symptoms not improving or worsening.   - amoxicillin-clavulanate (AUGMENTIN) 875-125 MG tablet  Dispense: 14 tablet; Refill: 0    The risks, benefits and treatment options of prescribed medications or other treatments have been discussed with the patient. The patient verbalized their understanding and should call or follow up if no improvement or if they develop further problems.      Jorge Alberto Thompson, [facility] Deleon is a 78 year old, presenting for the following health issues:  Throat Problem (Sore throat/ear.)        10/18/2023     1:40 PM   Additional Questions   Roomed by Nicole SALCEDO   Accompanied by self         10/18/2023     1:40 PM   Patient Reported Additional Medications   Patient reports taking the following new medications no new meds       HPI     78 year old male who presents to clinic for sore throat/ear      Acute Illness  Acute illness concerns: swelling on right side throat/ jaw  Onset/Duration: been on going a couple months, but got worse this weekend. Pain is starting to get slightly better now this Monday but is still  "present. Worse at night, laying on right side face and applying pressure.   Symptoms:  Fever: No  Chills/Sweats: No  Headache (location?): YES  Sinus Pressure: YES- right side   Conjunctivitis:  YES- pressure of right side of eye, on drainage   Ear Pain: YES: right  Rhinorrhea: YES  Congestion: Nothing out the ordinary   Sore Throat: YES- swollen tonsil on right side, having dental pain right side as well   Cough: no  Wheeze: No  Decreased Appetite: No, pt does not eat much in general   Nausea: No  Vomiting: No  Diarrhea: No  Dysuria/Freq.: No  Dysuria or Hematuria: No  Fatigue/Achiness: No  Sick/Strep Exposure: No  Therapies tried and outcome: brushing at night, gargling with salt water and peroxide, advil, and Aspercreme       Wishes to try an antibiotic for this.       DMT2  glimepiride 4 mg twice daily.   Atorvastatin 10 mg daily  Aspirin 81 mg daily  A1c of 7.0 on 3/15/2023  Due for labs.   He reports being on lisinopril for 30 years. Had a mild cough but tolerable. Wishes to go back on this medication.   Allergies to metformin, glipizide, actos.       Review of Systems   Constitutional, HEENT, cardiovascular, pulmonary, gi and gu systems are negative, except as otherwise noted.      Objective    BP (!) 152/90   Pulse 68   Temp 97.6  F (36.4  C) (Tympanic)   Ht 1.702 m (5' 7\")   Wt 97.5 kg (215 lb)   SpO2 98%   BMI 33.67 kg/m    Body mass index is 33.67 kg/m .  Physical Exam   General: alert, cooperative, no acute distress   HEENT: NC/AT, PERRL, TM's without signs of infection, nares patent, oropharynx clear and non-erythematous. Tenderness of sinuses on the right.   CV: RRR, no murmur  Resp: non-labored breathing, clear to auscultation, no wheezing or rales   Abdomen: Soft, non-tender, no guarding.   Extremities: No peripheral edema, calves non-tender.         "

## 2023-10-18 NOTE — LETTER
October 20, 2023      Pancho Zamanryan Esquivel  [address]  [place] [address]        Dear ,    We are writing to inform you of your test results.    BMP testing satisfactory   Hemoglobin A1c is elevated at 8.0   Urine urine testing satisfactory.     Continue with plan to follow-up with nurse blood pressure check on 11/1 and lab visit at that time as well.     Resulted Orders   Hemoglobin A1c   Result Value Ref Range    Hemoglobin A1C 8.0 (H) 0.0 - 5.6 %      Comment:      Normal <5.7%   Prediabetes 5.7-6.4%    Diabetes 6.5% or higher     Note: Adopted from ADA consensus guidelines.   Basic metabolic panel  (Ca, Cl, CO2, Creat, Gluc, K, Na, BUN)   Result Value Ref Range    Sodium 139 135 - 145 mmol/L      Comment:      Reference intervals for this test were updated on 09/26/2023 to more accurately reflect our healthy population. There may be differences in the flagging of prior results with similar values performed with this method. Interpretation of those prior results can be made in the context of the updated reference intervals.     Potassium 4.2 3.4 - 5.3 mmol/L    Chloride 103 98 - 107 mmol/L    Carbon Dioxide (CO2) 20 (L) 22 - 29 mmol/L    Anion Gap 16 (H) 7 - 15 mmol/L    Urea Nitrogen 16.3 8.0 - 23.0 mg/dL    Creatinine 1.02 0.67 - 1.17 mg/dL    GFR Estimate 76 >60 mL/min/1.73m2    Calcium 9.9 8.8 - 10.2 mg/dL    Glucose 169 (H) 70 - 99 mg/dL   Albumin Random Urine Quantitative with Creat Ratio   Result Value Ref Range    Creatinine Urine mg/dL 109.5 mg/dL      Comment:      The reference ranges have not been established in urine creatinine. The results should be integrated into the clinical context for interpretation.    Albumin Urine mg/L <12.0 mg/L      Comment:      The reference ranges have not been established in urine albumin. The results should be integrated into the clinical context for interpretation.    Albumin Urine mg/g Cr        Comment:      Unable to calculate, urine albumin  and/or urine creatinine is outside detectable limits.  Microalbuminuria is defined as an albumin:creatinine ratio of 17 to 299 for males and 25 to 299 for females. A ratio of albumin:creatinine of 300 or higher is indicative of overt proteinuria.  Due to biologic variability, positive results should be confirmed by a second, first-morning random or 24-hour timed urine specimen. If there is discrepancy, a third specimen is recommended. When 2 out of 3 results are in the microalbuminuria range, this is evidence for incipient nephropathy and warrants increased efforts at glucose control, blood pressure control, and institution of therapy with an angiotensin-converting-enzyme (ACE) inhibitor (if the patient can tolerate it).         If you have any questions or concerns, please call the clinic at the number listed above.       Sincerely,      Jorge Alberto Thompson, DO

## 2023-11-01 ENCOUNTER — ALLIED HEALTH/NURSE VISIT (OUTPATIENT)
Dept: FAMILY MEDICINE | Facility: CLINIC | Age: 78
End: 2023-11-01
Payer: COMMERCIAL

## 2023-11-01 ENCOUNTER — LAB (OUTPATIENT)
Dept: LAB | Facility: CLINIC | Age: 78
End: 2023-11-01
Payer: COMMERCIAL

## 2023-11-01 ENCOUNTER — TELEPHONE (OUTPATIENT)
Dept: FAMILY MEDICINE | Facility: CLINIC | Age: 78
End: 2023-11-01

## 2023-11-01 VITALS — HEART RATE: 65 BPM | SYSTOLIC BLOOD PRESSURE: 123 MMHG | DIASTOLIC BLOOD PRESSURE: 75 MMHG

## 2023-11-01 DIAGNOSIS — I10 ESSENTIAL HYPERTENSION: Primary | ICD-10-CM

## 2023-11-01 DIAGNOSIS — I10 ESSENTIAL HYPERTENSION: ICD-10-CM

## 2023-11-01 LAB
ANION GAP SERPL CALCULATED.3IONS-SCNC: 12 MMOL/L (ref 7–15)
BUN SERPL-MCNC: 21 MG/DL (ref 8–23)
CALCIUM SERPL-MCNC: 9.8 MG/DL (ref 8.8–10.2)
CHLORIDE SERPL-SCNC: 103 MMOL/L (ref 98–107)
CHOLEST SERPL-MCNC: 138 MG/DL
CREAT SERPL-MCNC: 1.12 MG/DL (ref 0.67–1.17)
DEPRECATED HCO3 PLAS-SCNC: 23 MMOL/L (ref 22–29)
EGFRCR SERPLBLD CKD-EPI 2021: 68 ML/MIN/1.73M2
GLUCOSE SERPL-MCNC: 153 MG/DL (ref 70–99)
HDLC SERPL-MCNC: 45 MG/DL
LDLC SERPL CALC-MCNC: 77 MG/DL
NONHDLC SERPL-MCNC: 93 MG/DL
POTASSIUM SERPL-SCNC: 4.3 MMOL/L (ref 3.4–5.3)
SODIUM SERPL-SCNC: 138 MMOL/L (ref 135–145)
TRIGL SERPL-MCNC: 78 MG/DL

## 2023-11-01 PROCEDURE — 99207 PR NO CHARGE NURSE ONLY: CPT

## 2023-11-01 PROCEDURE — 36415 COLL VENOUS BLD VENIPUNCTURE: CPT

## 2023-11-01 PROCEDURE — 80061 LIPID PANEL: CPT | Performed by: FAMILY MEDICINE

## 2023-11-01 PROCEDURE — 80048 BASIC METABOLIC PNL TOTAL CA: CPT

## 2023-11-01 NOTE — TELEPHONE ENCOUNTER
Pancho Najera . is a 78 year old patient who comes in today for a Blood Pressure check because of ongoing blood pressure monitoring.  Vital Signs as repeated by /75,p-65.  Patient is taking medication as prescribed  Patient is tolerating medications well.  Patient is not monitoring Blood Pressure at home.  Average readings if yes are NA  Current complaints: none  Disposition:  Pt was encouraged to check and record his BP at home. He does have a home monitor.

## 2024-01-12 ENCOUNTER — MEDICAL CORRESPONDENCE (OUTPATIENT)
Dept: HEALTH INFORMATION MANAGEMENT | Facility: CLINIC | Age: 79
End: 2024-01-12

## 2024-01-15 ENCOUNTER — TELEPHONE (OUTPATIENT)
Dept: FAMILY MEDICINE | Facility: CLINIC | Age: 79
End: 2024-01-15
Payer: COMMERCIAL

## 2024-01-15 NOTE — TELEPHONE ENCOUNTER
Pancho ROMAN Reinaldo Esquivel was identified as being non-adherent to his/her LISINOPRIL (medication name) in the last 30-90 days.  Reason(s) identified for non-adherence: adverse event/side effect to (medication name) Lisinopril - cough  Intervention(s) offered to assist patient with adherence:  Other : Pancho said this was discussed with Dr. Thompson and no alternative was needed    Recommendation to Provider: Remove Lisinopril from med list  Recommended follow-up: -  Completed by: Rachael Bolton, PharmD  [facility]  [phone]

## 2024-03-04 DIAGNOSIS — E11.42 TYPE 2 DIABETES MELLITUS WITH DIABETIC POLYNEUROPATHY, WITHOUT LONG-TERM CURRENT USE OF INSULIN (H): ICD-10-CM

## 2024-03-04 RX ORDER — ATORVASTATIN CALCIUM 10 MG/1
10 TABLET, FILM COATED ORAL DAILY
Qty: 90 TABLET | Refills: 2 | Status: SHIPPED | OUTPATIENT
Start: 2024-03-04

## 2024-03-12 ENCOUNTER — TRANSFERRED RECORDS (OUTPATIENT)
Dept: HEALTH INFORMATION MANAGEMENT | Facility: CLINIC | Age: 79
End: 2024-03-12
Payer: COMMERCIAL

## 2024-03-14 ENCOUNTER — OFFICE VISIT (OUTPATIENT)
Dept: FAMILY MEDICINE | Facility: CLINIC | Age: 79
End: 2024-03-14
Payer: COMMERCIAL

## 2024-03-14 VITALS
BODY MASS INDEX: 31.52 KG/M2 | HEART RATE: 65 BPM | DIASTOLIC BLOOD PRESSURE: 82 MMHG | WEIGHT: 208 LBS | HEIGHT: 68 IN | SYSTOLIC BLOOD PRESSURE: 136 MMHG | RESPIRATION RATE: 16 BRPM | TEMPERATURE: 97.3 F | OXYGEN SATURATION: 98 %

## 2024-03-14 DIAGNOSIS — G89.29 CHRONIC LEFT-SIDED LOW BACK PAIN WITHOUT SCIATICA: ICD-10-CM

## 2024-03-14 DIAGNOSIS — E11.42 TYPE 2 DIABETES MELLITUS WITH DIABETIC POLYNEUROPATHY, WITHOUT LONG-TERM CURRENT USE OF INSULIN (H): Primary | ICD-10-CM

## 2024-03-14 DIAGNOSIS — N18.31 STAGE 3A CHRONIC KIDNEY DISEASE (H): ICD-10-CM

## 2024-03-14 DIAGNOSIS — M54.50 CHRONIC LEFT-SIDED LOW BACK PAIN WITHOUT SCIATICA: ICD-10-CM

## 2024-03-14 DIAGNOSIS — R10.9 FLANK PAIN: ICD-10-CM

## 2024-03-14 LAB
ALBUMIN UR-MCNC: NEGATIVE MG/DL
APPEARANCE UR: CLEAR
BACTERIA #/AREA URNS HPF: ABNORMAL /HPF
BILIRUB UR QL STRIP: NEGATIVE
COLOR UR AUTO: YELLOW
GLUCOSE UR STRIP-MCNC: NEGATIVE MG/DL
HBA1C MFR BLD: 8.1 % (ref 0–5.6)
HGB UR QL STRIP: NEGATIVE
KETONES UR STRIP-MCNC: NEGATIVE MG/DL
LEUKOCYTE ESTERASE UR QL STRIP: NEGATIVE
MUCOUS THREADS #/AREA URNS LPF: PRESENT /LPF
NITRATE UR QL: NEGATIVE
PH UR STRIP: 5.5 [PH] (ref 5–7)
RBC #/AREA URNS AUTO: ABNORMAL /HPF
SP GR UR STRIP: 1.02 (ref 1–1.03)
SQUAMOUS #/AREA URNS AUTO: ABNORMAL /LPF
UROBILINOGEN UR STRIP-ACNC: 0.2 E.U./DL
WBC #/AREA URNS AUTO: ABNORMAL /HPF

## 2024-03-14 PROCEDURE — 36415 COLL VENOUS BLD VENIPUNCTURE: CPT | Performed by: NURSE PRACTITIONER

## 2024-03-14 PROCEDURE — 83036 HEMOGLOBIN GLYCOSYLATED A1C: CPT | Performed by: NURSE PRACTITIONER

## 2024-03-14 PROCEDURE — 99214 OFFICE O/P EST MOD 30 MIN: CPT | Performed by: NURSE PRACTITIONER

## 2024-03-14 PROCEDURE — 81001 URINALYSIS AUTO W/SCOPE: CPT | Performed by: NURSE PRACTITIONER

## 2024-03-14 RX ORDER — RESPIRATORY SYNCYTIAL VIRUS VACCINE 120MCG/0.5
0.5 KIT INTRAMUSCULAR ONCE
Qty: 1 EACH | Refills: 0 | Status: CANCELLED | OUTPATIENT
Start: 2024-03-14 | End: 2024-03-14

## 2024-03-14 ASSESSMENT — PATIENT HEALTH QUESTIONNAIRE - PHQ9
SUM OF ALL RESPONSES TO PHQ QUESTIONS 1-9: 1
5. POOR APPETITE OR OVEREATING: SEVERAL DAYS

## 2024-03-14 ASSESSMENT — ANXIETY QUESTIONNAIRES
7. FEELING AFRAID AS IF SOMETHING AWFUL MIGHT HAPPEN: NOT AT ALL
2. NOT BEING ABLE TO STOP OR CONTROL WORRYING: NOT AT ALL
6. BECOMING EASILY ANNOYED OR IRRITABLE: NOT AT ALL
5. BEING SO RESTLESS THAT IT IS HARD TO SIT STILL: NOT AT ALL
1. FEELING NERVOUS, ANXIOUS, OR ON EDGE: NOT AT ALL
3. WORRYING TOO MUCH ABOUT DIFFERENT THINGS: NOT AT ALL
IF YOU CHECKED OFF ANY PROBLEMS ON THIS QUESTIONNAIRE, HOW DIFFICULT HAVE THESE PROBLEMS MADE IT FOR YOU TO DO YOUR WORK, TAKE CARE OF THINGS AT HOME, OR GET ALONG WITH OTHER PEOPLE: NOT DIFFICULT AT ALL
GAD7 TOTAL SCORE: 1
GAD7 TOTAL SCORE: 1

## 2024-03-14 ASSESSMENT — PAIN SCALES - GENERAL: PAINLEVEL: NO PAIN (0)

## 2024-03-14 NOTE — PATIENT INSTRUCTIONS
A1C today.  Start Jardiance 10 mg daily.  Continue to check Blood sugars at home.  Plan to make lab appointment in 3 months for recheck A1C.  Kidney function looked great in November 2023.  Urine analysis today just to make sure your bladder and kidneys are not infected.  Likely that the pain is in the back since there is disc herniation still occurring and some pressure on the spinal cord.  I referred you to [facility] in [facility] for evaluation of the back and treatment plan.

## 2024-03-14 NOTE — PROGRESS NOTES
Assessment & Plan     Type 2 diabetes mellitus with diabetic polyneuropathy, without long-term current use of insulin (H)  A1c has been elevated to 8.  Rechecking A1c today but adding Jardiance 10 mg to see if this helps to reduce his blood sugars a little further.  Would plan to try to target below 7 and possibly increase the Jardiance to 25 mg if tolerated.  Plan to follow-up in 3 months for recheck A1c.  Recommended follow-up in 6 months for diabetic check in clinic.  - HEMOGLOBIN A1C; Future  - empagliflozin (JARDIANCE) 10 MG TABS tablet; Take 1 tablet (10 mg) by mouth daily  - Hemoglobin A1c; Future  - HEMOGLOBIN A1C    Stage 3a chronic kidney disease (H)  Patient had BMP on 11/1/2023 which showed a creatinine of 1.12 and GFR estimate of 68.  Patient is currently stable.    Flank pain  UA ordered and is normal.  This is unlikely flank pain since it seems to be more in the lower back.  - UA with Microscopic reflex to Culture - lab collect; Future  - UA with Microscopic reflex to Culture - lab collect  - UA Microscopic with Reflex to Culture    Chronic left-sided low back pain without sciatica  Referring to [facility] over and she saw the [facility] for review of his MRI and evaluation of options for treatment.  Likely this pain is caused by some stenosis that was seen on the last MRI.  - Spine  Referral; Future    See Patient Instructions    Roberto Deleon is a 79 year old, presenting for the following health issues:  Diabetes, Lipids, and Hypertension      3/14/2024    11:02 AM   Additional Questions   Roomed by rmb   Accompanied by wife         3/14/2024    11:02 AM   Patient Reported Additional Medications   Patient reports taking the following new medications none     HPI     Diabetes Follow-up    How often are you checking your blood sugar? Two times daily  Blood sugar testing frequency justification:  Adjustment of medication(s)  What time of day are you checking your blood sugars (select all  that apply)?  Before meals and At bedtime  Have you had any blood sugars above 200?  Yes 229  Have you had any blood sugars below 70?  No  What symptoms do you notice when your blood sugar is low?  None  What concerns do you have today about your diabetes? Blood sugar is often over 200   Do you have any of these symptoms? (Select all that apply)  Numbness in feet and Blurry vision  Have you had a diabetic eye exam in the last 12 months? No    Hyperlipidemia Follow-Up    Are you regularly taking any medication or supplement to lower your cholesterol?   Yes-    Are you having muscle aches or other side effects that you think could be caused by your cholesterol lowering medication?  No    Hypertension Follow-up    Do you check your blood pressure regularly outside of the clinic? Yes   Are you following a low salt diet? Yes  Are your blood pressures ever more than 140 on the top number (systolic) OR more   than 90 on the bottom number (diastolic), for example 140/90? No    BP Readings from Last 2 Encounters:   03/14/24 136/82   11/01/23 123/75     Hemoglobin A1C (%)   Date Value   10/18/2023 8.0 (H)   03/15/2023 7.0 (H)     LDL Cholesterol Calculated (mg/dL)   Date Value   11/01/2023 77   08/23/2022 59     How many servings of fruits and vegetables do you eat daily?  0-1  On average, how many sweetened beverages do you drink each day (Examples: soda, juice, sweet tea, etc.  Do NOT count diet or artificially sweetened beverages)?   0  How many days per week do you exercise enough to make your heart beat faster? 3 or less  How many minutes a day do you exercise enough to make your heart beat faster? 30 - 60  How many days per week do you miss taking your medication? 0  Pain History:  When did you first notice your pain? 1.5 years ago   Have you seen this provider for your pain in the past? No   Where in your body do your have pain? Middle back,had back surgery   Are you seeing anyone else for your pain? Yes -   "cora/neuro   What makes your pain better? none  What makes your pain worse? none  How has pain affected your ability to work? Not applicable  Who lives in your household? wife    Patient has numbness down both legs since herniated disc spine surgery.  He had an epidural spine injection and was unable to have BM which is improving now.  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  He complains of pain in the upper lumbar region with radiation to the side of the back.  He is concerned that this is his kidneys also.        7/5/2022     1:27 PM 9/26/2022    10:27 AM 3/14/2024     1:00 PM   PHQ-9 SCORE   PHQ-9 Total Score MyChart 2 (Minimal depression) 4 (Minimal depression)    PHQ-9 Total Score 2 4 1           9/14/2021     3:01 PM 7/5/2022     1:28 PM 3/14/2024     1:00 PM   SHANTELLE-7 SCORE   Total Score 12 (moderate anxiety) 10 (moderate anxiety)    Total Score 12 10 1           6/15/2023    10:42 AM 3/14/2024    11:13 AM   PEG Score   PEG Total Score 6.33 5.67       Chronic Pain - Initial Assessment:    How would you describe your pain?  Patient states that he has sharp pain in the mid lower back and going down legs with some numbness in the upper thighs since his last surgery.  He had epidural completed for pain relief which caused him to stop having bowel movements.  He has regained control of his bowel movements but continues to have pain in despite epidural.  Have you had any recent changes to the severity or character of your pain?  Sharp in the upper lumbar part of the back.  Is there an underlying cause that has been identified?  Patient states he had a \"botched job\" of his surgery for his herniated disc  Has your ability to work or do daily activities changed recently because of your pain?  Yes he is using a walker currently to walk, however he did not use this today and it is noticeable that his gait is off trying to keep balance  Which of these " "pain treatments have you tried? Pain Clinic, Physical Therapy, and Surgery  Previous medication treatments:  NSAIDs - ibuprofen (Motrin)      Review of Systems  CONSTITUTIONAL: NEGATIVE for fever, chills, change in weight  RESP: NEGATIVE for significant cough or SOB  CV: NEGATIVE for chest pain, palpitations or peripheral edema  MUSCULOSKELETAL: POSITIVE  for back pain upper lumbar area and down legs, numbness on upper thighs since surgery, recent epidural caused bowel issues which he was not able to go to the bathroom but pain is still present.  PSYCHIATRIC: NEGATIVE for changes in mood or affect  ROS otherwise negative      Objective    /82   Pulse 65   Temp 97.3  F (36.3  C) (Tympanic)   Resp 16   Ht 1.715 m (5' 7.5\")   Wt 94.3 kg (208 lb)   SpO2 98%   BMI 32.10 kg/m    Body mass index is 32.1 kg/m .  Physical Exam   GENERAL: alert and no distress  RESP: lungs clear to auscultation - no rales, rhonchi or wheezes  CV: regular rate and rhythm, normal S1 S2, no S3 or S4, no murmur, click or rub, no peripheral edema  MS: no gross musculoskeletal defects noted, no edema  Comprehensive back pain exam:  Tenderness of upper lumbar area of lower back, Range of motion not limited by pain, Lower extremity strength functional and equal on both sides, Lower extremity reflexes within normal limits bilaterally, numbness in the top of both thighs, and Straight leg raise negative bilaterally  Diabetic foot exam: normal DP and PT pulses, no trophic changes or ulcerative lesions, and reduced sensation at left great toe and ball of foot otherwise sensation  is normal, not sure if this is due to back or diabetes     Signed Electronically by: Shelia Hairston NP    "

## 2024-03-31 DIAGNOSIS — E11.42 TYPE 2 DIABETES MELLITUS WITH DIABETIC POLYNEUROPATHY, WITHOUT LONG-TERM CURRENT USE OF INSULIN (H): ICD-10-CM

## 2024-04-01 RX ORDER — GLIMEPIRIDE 4 MG/1
4 TABLET ORAL
Qty: 180 TABLET | Refills: 1 | Status: SHIPPED | OUTPATIENT
Start: 2024-04-01

## 2024-04-10 DIAGNOSIS — E11.42 TYPE 2 DIABETES MELLITUS WITH DIABETIC POLYNEUROPATHY, WITHOUT LONG-TERM CURRENT USE OF INSULIN (H): ICD-10-CM

## 2024-04-11 RX ORDER — BLOOD SUGAR DIAGNOSTIC
STRIP MISCELLANEOUS
Qty: 100 STRIP | Refills: 2 | Status: SHIPPED | OUTPATIENT
Start: 2024-04-11 | End: 2024-10-03

## 2024-05-01 ENCOUNTER — OFFICE VISIT (OUTPATIENT)
Dept: URGENT CARE | Facility: URGENT CARE | Age: 79
End: 2024-05-01
Payer: COMMERCIAL

## 2024-05-01 ENCOUNTER — NURSE TRIAGE (OUTPATIENT)
Dept: FAMILY MEDICINE | Facility: CLINIC | Age: 79
End: 2024-05-01

## 2024-05-01 VITALS
WEIGHT: 206 LBS | HEART RATE: 60 BPM | DIASTOLIC BLOOD PRESSURE: 88 MMHG | SYSTOLIC BLOOD PRESSURE: 141 MMHG | OXYGEN SATURATION: 98 % | TEMPERATURE: 97.5 F | BODY MASS INDEX: 31.79 KG/M2 | RESPIRATION RATE: 16 BRPM

## 2024-05-01 DIAGNOSIS — J01.01 ACUTE RECURRENT MAXILLARY SINUSITIS: Primary | ICD-10-CM

## 2024-05-01 DIAGNOSIS — R42 DIZZINESS: ICD-10-CM

## 2024-05-01 DIAGNOSIS — H61.21 IMPACTED CERUMEN OF RIGHT EAR: ICD-10-CM

## 2024-05-01 LAB
ALBUMIN UR-MCNC: NEGATIVE MG/DL
ANION GAP SERPL CALCULATED.3IONS-SCNC: 9 MMOL/L (ref 7–15)
APPEARANCE UR: CLEAR
BILIRUB UR QL STRIP: NEGATIVE
BUN SERPL-MCNC: 21.6 MG/DL (ref 8–23)
CALCIUM SERPL-MCNC: 10.1 MG/DL (ref 8.8–10.2)
CHLORIDE SERPL-SCNC: 103 MMOL/L (ref 98–107)
COLOR UR AUTO: YELLOW
CREAT SERPL-MCNC: 1.16 MG/DL (ref 0.67–1.17)
DEPRECATED HCO3 PLAS-SCNC: 25 MMOL/L (ref 22–29)
EGFRCR SERPLBLD CKD-EPI 2021: 64 ML/MIN/1.73M2
ERYTHROCYTE [DISTWIDTH] IN BLOOD BY AUTOMATED COUNT: 12.7 % (ref 10–15)
GLUCOSE SERPL-MCNC: 167 MG/DL (ref 70–99)
GLUCOSE UR STRIP-MCNC: 500 MG/DL
HCT VFR BLD AUTO: 42.8 % (ref 40–53)
HGB BLD-MCNC: 14.5 G/DL (ref 13.3–17.7)
HGB UR QL STRIP: NEGATIVE
KETONES UR STRIP-MCNC: NEGATIVE MG/DL
LEUKOCYTE ESTERASE UR QL STRIP: NEGATIVE
MCH RBC QN AUTO: 31.1 PG (ref 26.5–33)
MCHC RBC AUTO-ENTMCNC: 33.9 G/DL (ref 31.5–36.5)
MCV RBC AUTO: 92 FL (ref 78–100)
NITRATE UR QL: NEGATIVE
PH UR STRIP: 5.5 [PH] (ref 5–7)
PLATELET # BLD AUTO: 191 10E3/UL (ref 150–450)
POTASSIUM SERPL-SCNC: 4.3 MMOL/L (ref 3.4–5.3)
RBC # BLD AUTO: 4.66 10E6/UL (ref 4.4–5.9)
SODIUM SERPL-SCNC: 137 MMOL/L (ref 135–145)
SP GR UR STRIP: 1.01 (ref 1–1.03)
UROBILINOGEN UR STRIP-ACNC: 0.2 E.U./DL
WBC # BLD AUTO: 6.2 10E3/UL (ref 4–11)

## 2024-05-01 PROCEDURE — 80048 BASIC METABOLIC PNL TOTAL CA: CPT | Performed by: NURSE PRACTITIONER

## 2024-05-01 PROCEDURE — 99214 OFFICE O/P EST MOD 30 MIN: CPT | Mod: 25 | Performed by: NURSE PRACTITIONER

## 2024-05-01 PROCEDURE — 36415 COLL VENOUS BLD VENIPUNCTURE: CPT | Performed by: NURSE PRACTITIONER

## 2024-05-01 PROCEDURE — 85027 COMPLETE CBC AUTOMATED: CPT | Performed by: NURSE PRACTITIONER

## 2024-05-01 PROCEDURE — 69210 REMOVE IMPACTED EAR WAX UNI: CPT | Mod: RT | Performed by: NURSE PRACTITIONER

## 2024-05-01 PROCEDURE — 81003 URINALYSIS AUTO W/O SCOPE: CPT | Performed by: NURSE PRACTITIONER

## 2024-05-01 NOTE — TELEPHONE ENCOUNTER
"  Nurse Triage SBAR    Is this a 2nd Level Triage? NO    Situation: tonsils, dizziness, recent fall    Background: patient was started on Jardiance 3/14; dizziness since then. Has had swollen tonsils/sinus pain he feels are also contributing to dizziness.    Assessment: Patient having mild dizziness. Feels lightheaded,\"foggy\" does not feel like he would fall now. Worse when he bends over. Blood sugar this AM - 170. Denies vision changes, room tilting/spinning, chest pain, shortness of breath, vomiting, diarrhea, bleeding.   Having tonsil pressure and swelling. Denies fever. States he feels the pressure is affecting his ear, contributing to the dizziness.     Patient had fall on Sunday. Denies that he passed out; states he bent over and was able to take a step but then fell on his knee and hands. Patient denies hitting his head. Knee is not swollen, just slightly bruised. Has full ROM, able to bear weight.     Protocol Recommended Disposition:   Go To Office Now    Recommendation: Patient should be seen and wants to be seen. Please reach out to patient to schedule, unable to access schedule as out of puddle RN.  [phone]    Instructed pt to call back if new or worsening symptoms.  Patient was given an opportunity to ask questions, verbalized understanding of plan, and is agreeable.      Routing to triage pool     Does the patient meet one of the following criteria for ADS visit consideration? 16+ years old, with an [facility] PCP         Nicky MONSIVAIS RN on 5/1/2024 at 9:44 AM           Reason for Disposition   Lightheadedness (dizziness) present now, after 2 hours of rest and fluids    Additional Information   Negative: SEVERE difficulty breathing (e.g., struggling for each breath, speaks in single words)   Negative: Shock suspected (e.g., cold/pale/clammy skin, too weak to stand, low BP, rapid pulse)   Negative: Difficult to awaken or acting confused (e.g., disoriented, slurred speech)   Negative: Fainted, and still " feels dizzy afterwards   Negative: Overdose (accidental or intentional) of medications   Negative: New neurologic deficit that is present now: * Weakness of the face, arm, or leg on one side of the body * Numbness of the face, arm, or leg on one side of the body * Loss of speech or garbled speech   Negative: Heart beating < 50 beats per minute OR > 140 beats per minute   Negative: Sounds like a life-threatening emergency to the triager   Negative: Chest pain   Negative: Rectal bleeding, bloody stool, or tarry-black stool   Negative: Vomiting is main symptom   Negative: Diarrhea is main symptom   Negative: Headache is main symptom   Negative: Heat exhaustion suspected (i.e., dehydration from heat exposure)   Negative: Patient states that they are having an anxiety or panic attack   Negative: SEVERE dizziness (e.g., unable to stand, requires support to walk, feels like passing out now)   Negative: SEVERE headache or neck pain   Negative: Spinning or tilting sensation (vertigo) present now and one or more stroke risk factors (i.e., hypertension, diabetes mellitus, prior stroke/TIA, heart attack, age over 60) (Exception: Prior physician evaluation for this AND no different/worse than usual.)   Negative: Neurologic deficit that was brief (now gone), ANY of the following:* Weakness of the face, arm, or leg on one side of the body* Numbness of the face, arm, or leg on one side of the body* Loss of speech or garbled speech   Negative: Loss of vision or double vision  (Exception: Similar to previous migraines.)   Negative: Difficulty breathing   Negative: Drinking very little and dehydration suspected (e.g., no urine > 12 hours, very dry mouth, very lightheaded)   Negative: Follows bleeding (e.g., stomach, rectum, vagina)  (Exception: Became dizzy from sight of small amount blood.)   Negative: Patient sounds very sick or weak to the triager   Negative: Dizziness from low blood sugar (i.e., < 60 mg/dl or 3.5 mmol/l)    Answer  "Assessment - Initial Assessment Questions  1. DESCRIPTION: \"Describe your dizziness.\"      Lightheadedness/woozy  2. LIGHTHEADED: \"Do you feel lightheaded?\" (e.g., somewhat faint, woozy, weak upon standing)      yes  3. VERTIGO: \"Do you feel like either you or the room is spinning or tilting?\" (i.e. vertigo)      no  4. SEVERITY: \"How bad is it?\"  \"Do you feel like you are going to faint?\" \"Can you stand and walk?\"    - MILD: Feels slightly dizzy, but walking normally.    - MODERATE: Feels unsteady when walking, but not falling; interferes with normal activities (e.g., school, work).    - SEVERE: Unable to walk without falling, or requires assistance to walk without falling; feels like passing out now.       Pressure in head, \"foggy\", walk normally - slightly dizzy  5. ONSET:  \"When did the dizziness begin?\"      When started jardiace  6. AGGRAVATING FACTORS: \"Does anything make it worse?\" (e.g., standing, change in head position)      When bend down- tried pick something up  7. HEART RATE: \"Can you tell me your heart rate?\" \"How many beats in 15 seconds?\"  (Note: not all patients can do this)        no  8. CAUSE: \"What do you think is causing the dizziness?\"      medication  9. RECURRENT SYMPTOM: \"Have you had dizziness before?\" If Yes, ask: \"When was the last time?\" \"What happened that time?\"      Since started medication  10. OTHER SYMPTOMS: \"Do you have any other symptoms?\" (e.g., fever, chest pain, vomiting, diarrhea, bleeding)        No fever, chest pain, shortness of breath, vomiting, diarrhea, bleeding  11. PREGNANCY: \"Is there any chance you are pregnant?\" \"When was your last menstrual period?\"        no    Protocols used: Dizziness-A-OH    "

## 2024-05-01 NOTE — TELEPHONE ENCOUNTER
Called pt & read providers message. No appts available. Pt was agreeable to be seen in [facility] today. He will head up there now.    Nasima Robertson RN

## 2024-05-15 ENCOUNTER — OFFICE VISIT (OUTPATIENT)
Dept: FAMILY MEDICINE | Facility: CLINIC | Age: 79
End: 2024-05-15
Payer: COMMERCIAL

## 2024-05-15 VITALS
BODY MASS INDEX: 32.02 KG/M2 | DIASTOLIC BLOOD PRESSURE: 70 MMHG | TEMPERATURE: 97.5 F | WEIGHT: 204 LBS | RESPIRATION RATE: 20 BRPM | OXYGEN SATURATION: 96 % | SYSTOLIC BLOOD PRESSURE: 100 MMHG | HEART RATE: 70 BPM | HEIGHT: 67 IN

## 2024-05-15 DIAGNOSIS — E11.42 TYPE 2 DIABETES MELLITUS WITH DIABETIC POLYNEUROPATHY, WITHOUT LONG-TERM CURRENT USE OF INSULIN (H): Primary | ICD-10-CM

## 2024-05-15 DIAGNOSIS — R22.1 NECK MASS: ICD-10-CM

## 2024-05-15 PROCEDURE — 99214 OFFICE O/P EST MOD 30 MIN: CPT | Performed by: FAMILY MEDICINE

## 2024-05-15 RX ORDER — RESPIRATORY SYNCYTIAL VIRUS VACCINE 120MCG/0.5
0.5 KIT INTRAMUSCULAR ONCE
Qty: 1 EACH | Refills: 0 | Status: CANCELLED | OUTPATIENT
Start: 2024-05-15 | End: 2024-05-15

## 2024-05-15 ASSESSMENT — PAIN SCALES - GENERAL: PAINLEVEL: MODERATE PAIN (4)

## 2024-05-15 NOTE — PATIENT INSTRUCTIONS
Proceed with CT scan of the neck for further evaluation and cares.     Jardiance medication refilled today.

## 2024-05-15 NOTE — PROGRESS NOTES
"  Assessment & Plan     Neck mass  -- plan to further evaluate with CT of the neck. Next steps likely referral back to ENT for further evaluation and next steps. It has responded to the Augmentin medication.   - CT Soft Tissue Neck w Contrast    Type 2 diabetes mellitus with diabetic polyneuropathy, without long-term current use of insulin (H)  Recently started on Jardiance. Has recheck of A1c lab on 6/12.  Last A1c elevated at 8.1.   - empagliflozin (JARDIANCE) 10 MG TABS tablet  Dispense: 90 tablet; Refill: 0          MED REC REQUIRED  Post Medication Reconciliation Status: discharge medications reconciled, continue medications without change  BMI  Estimated body mass index is 31.55 kg/m  as calculated from the following:    Height as of this encounter: 1.712 m (5' 7.42\").    Weight as of this encounter: 92.5 kg (204 lb).   Weight management plan: Discussed healthy diet and exercise guidelines    The risks, benefits and treatment options of prescribed medications or other treatments have been discussed with the patient. The patient verbalized their understanding and should call or follow up if no improvement or if they develop further problems.        Roberto Deleon is a 79 year old, presenting for the following health issues:  ER F/U        5/15/2024    11:22 AM   Additional Questions   Roomed by Alejandra PAIGE   Accompanied by spouse, Elma     History of Present Illness       Reason for visit:  Recheck on neck swelling    He eats 0-1 servings of fruits and vegetables daily.He consumes 0 sweetened beverage(s) daily.He exercises with enough effort to increase his heart rate 9 or less minutes per day.  He exercises with enough effort to increase his heart rate 7 days per week.   He is taking medications regularly.         ED/UC Followup:    Facility:  [facility]  Date of visit: 05/01/2024  Reason for visit: Dizziness and Acute recurrent maxillary sinusitis  Current Status: has " "recurring neck swelling that gets better with antibiotics    Urgent care visit on 5/1   Swelling on the right side of his face.   Received Augmentin and swelling improved in a couple of days. Yet still persist.   Dizziness improved with his antibiotic.   In the past underwent parotid gland biopsy on 6/9/2022 by ENT.   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           Objective    /70 (BP Location: Right arm, Patient Position: Chair, Cuff Size: Adult Regular)   Pulse 70   Temp 97.5  F (36.4  C) (Oral)   Resp 20   Ht 1.712 m (5' 7.42\")   Wt 92.5 kg (204 lb)   SpO2 96%   BMI 31.55 kg/m    Body mass index is 31.55 kg/m .  Physical Exam   General: Alert, cooperative, no acute distress  Head: Normocephalic, atraumatic   Eyes: PERRL, no scleral icterus, no conjunctival injection   Ears: External ear without deformity, external canals patent, TM intact with no signs of infection   Nose: nares patent  Throat: Oropharynx clear, non-erythematous, tonsils non-enlarged   Neck: Non-tender to palpation, neck mass present on the right side of the neck in the parotid/ submandibular gland region. No overlying erythema.         Signed Electronically by: Jorge Alberto Thompson DO    "

## 2024-06-05 ENCOUNTER — HOSPITAL ENCOUNTER (OUTPATIENT)
Dept: CT IMAGING | Facility: CLINIC | Age: 79
Discharge: HOME | End: 2024-06-05
Attending: FAMILY MEDICINE | Admitting: FAMILY MEDICINE
Payer: COMMERCIAL

## 2024-06-05 DIAGNOSIS — R22.1 NECK MASS: ICD-10-CM

## 2024-06-05 PROCEDURE — 250N000009 HC RX 250: Performed by: RADIOLOGY

## 2024-06-05 PROCEDURE — 70491 CT SOFT TISSUE NECK W/DYE: CPT

## 2024-06-05 PROCEDURE — 250N000011 HC RX IP 250 OP 636: Performed by: RADIOLOGY

## 2024-06-05 RX ORDER — IOPAMIDOL 755 MG/ML
90 INJECTION, SOLUTION INTRAVASCULAR ONCE
Status: COMPLETED | OUTPATIENT
Start: 2024-06-05 | End: 2024-06-05

## 2024-06-05 RX ADMIN — SODIUM CHLORIDE 80 ML: 9 INJECTION, SOLUTION INTRAVENOUS at 14:23

## 2024-06-05 RX ADMIN — IOPAMIDOL 90 ML: 755 INJECTION, SOLUTION INTRAVENOUS at 14:23

## 2024-06-07 DIAGNOSIS — K11.9 LESION OF PAROTID GLAND: Primary | ICD-10-CM

## 2024-06-12 ENCOUNTER — TELEPHONE (OUTPATIENT)
Dept: OTOLARYNGOLOGY | Facility: CLINIC | Age: 79
End: 2024-06-12

## 2024-06-12 ENCOUNTER — LAB (OUTPATIENT)
Dept: LAB | Facility: CLINIC | Age: 79
End: 2024-06-12
Payer: COMMERCIAL

## 2024-06-12 ENCOUNTER — ALLIED HEALTH/NURSE VISIT (OUTPATIENT)
Dept: FAMILY MEDICINE | Facility: CLINIC | Age: 79
End: 2024-06-12
Payer: COMMERCIAL

## 2024-06-12 DIAGNOSIS — E11.42 TYPE 2 DIABETES MELLITUS WITH DIABETIC POLYNEUROPATHY, WITHOUT LONG-TERM CURRENT USE OF INSULIN (H): ICD-10-CM

## 2024-06-12 DIAGNOSIS — K11.9 LESION OF PAROTID GLAND: Primary | ICD-10-CM

## 2024-06-12 LAB — HBA1C MFR BLD: 7.5 % (ref 0–5.6)

## 2024-06-12 PROCEDURE — 99207 PR NO CHARGE NURSE ONLY: CPT

## 2024-06-12 PROCEDURE — 36415 COLL VENOUS BLD VENIPUNCTURE: CPT

## 2024-06-12 PROCEDURE — 83036 HEMOGLOBIN GLYCOSYLATED A1C: CPT

## 2024-06-12 NOTE — TELEPHONE ENCOUNTER
[facility] Call Center    Phone Message    May a detailed message be left on voicemail: yes     Reason for Call: Other: Pt called in regarding a missed call he got from JACOB Nino. He stated he believe it was to get him scheduled at the [facility] location. Please call to discuss. Thanks     Action Taken: Message routed to:  Other: ENT     Travel Screening: Not Applicable     Date of Service:

## 2024-06-13 NOTE — TELEPHONE ENCOUNTER
Attempted to reach patient but did not leave message. I did not need anything further from him. I spoke to him on Tuesday and offered him an appointment with Jose De Jesus Rodarte PA-C but he declined because he didn't want to travel to [facility].   Mica Joy RN on 6/13/2024 at 11:50 AM

## 2024-06-14 ENCOUNTER — TELEPHONE (OUTPATIENT)
Dept: FAMILY MEDICINE | Facility: CLINIC | Age: 79
End: 2024-06-14

## 2024-06-14 NOTE — TELEPHONE ENCOUNTER
Pt called back and he is still trying to schedule  ENT appt, his referral is for 1-2 weeks. Pt willing to go to [place]  Please call pt back  Leigh Angeles RN on 6/14/2024 at 9:25 AM

## 2024-06-14 NOTE — TELEPHONE ENCOUNTER
Spoke with patient, scheduled with Jose De Jesus Rodarte in [place].    Melida Gill RN Care Coordinator, [facility] 06/14/24 9:48 AM

## 2024-06-17 PROBLEM — D11.9 WARTHIN TUMOR: Status: ACTIVE | Noted: 2024-06-17

## 2024-06-17 NOTE — PROGRESS NOTES
Assessment & Plan     Benign exam today.  There was no generalized swelling of the parotid on his recent CAT scan and the Warthin's tumor is effectively stable.  There is no appreciable swelling, erythema or tenderness in the parotid today to make me suspect there is infection.  I suspect his pain primarily related to the TMJ and musculoskeletal neck pain.  Will refer to physical therapy as below.    Will refer to vestibular therapy for patient's general unsteadiness.  Good have my moments does not overtly appear to be vestibular today.      Problem List Items Addressed This Visit          Nervous and Auditory    Type 2 diabetes mellitus with diabetic polyneuropathy, without long-term current use of insulin (H)       Digestive    Warthin tumor     Other Visit Diagnoses       TMJ (temporomandibular joint syndrome)    -  Primary    Relevant Orders    Physical Therapy  Referral    Unsteadiness        Relevant Orders    Physical Therapy  Referral    Cervical pain        Relevant Orders    Physical Therapy  Referral             Review of external notes as documented elsewhere in note    34 minutes spent on the date of the encounter doing chart review, history and exam, documentation and further activities per the note  {     DARCY Shaikh  [facility]    Subjective     HPI        Per 5/15 PRIMARY CARE PROVIDER note:  ED/UC Followup:     Facility:  [facility] [place]  Date of visit: 05/01/2024  Reason for visit: Dizziness and Acute recurrent maxillary sinusitis  Current Status: has recurring neck swelling that gets better with antibiotics     Urgent care visit on 5/1   Swelling on the right side of his face. Received Augmentin and swelling improved in a couple of days. Yet still persist. Dizziness improved with his antibiotic. In the past underwent parotid gland biopsy on 6/9/2022 by ENT. Pathology results:Benign oncocytic neoplasm  given the focally prominent lymphoid component in the biopsy suggestive of Warthin tumor.     6/15 neck CT:  IMPRESSION:    1. Compared to 8/20/2021, there has been slight increase in size of right parotid lesions. These may represent primary salivary gland tumors or enlarged lymph nodes.    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 Dr Alonso did not believe he was a good surgical candidate given location of the parotid mass. Tx'd 5/1 with augmentin for facial pain, swelling, swollen salivary gland vs lymph node vs sinusitis.     For past 6 months has been having increased facial swelling and pain on the RT.   The Augmentin helped.  Denies overt neck pain,    Recent A1c was 7.5      Chronically for years, sometimes gets unsteady if rolls over in bed, sometimes if bends over and looks up.   Walks with a cane.  Not vertiginous.  CT brain and CTA in 2022 was noncontributory.      Review of Systems   ENT as above      Objective    There were no vitals taken for this visit.    Physical Exam     Constitutional:   The patient was in no acute distress.      Head/Face:   Normocephalic and atraumatic.  No lesions or scars.     Ears:  The tympanic membranes are normal in appearance, bony landmarks are intact.  No retraction, perforation, or masses.   No fluid or purulence was seen in the external canal or the middle ear. No evidence of infection of the middle ear or external canal, cerumen was normal in appearance.    Nose:  Anterior rhinoscopy revealed midline septum and absence of purulence or polyps.      Mouth:  Normal tongue, floor of mouth, buccal mucosa, and palate.  No lesions, ulceration or  masses on inspection, normal voice quality  , Rt parotid nonttp w/o swelling or masses, Stensen duct appears,   Oropharynx:  Normal mucosa, palate symmetric with normal elevation. Tonsils 1+.  Pterygoid region non-tender w/ 3+  crepitus b/l.  + mastoid TTP on rt at SCM attachment.      Neck:  Supple with normal laryngeal  and tracheal landmarks. No palpable thyroid.     Lymphatic:  There is no palpable lymphadenopathy in the neck.       Neuro: Alert and oriented - normal speech. Cranial nerves 2-12 intact.  Normal strength. Using extremities freely    Balance evaluation -      Romberg +.  The patient was able to stand independently in the exam room with his cane.

## 2024-06-28 ENCOUNTER — OFFICE VISIT (OUTPATIENT)
Dept: OTOLARYNGOLOGY | Facility: CLINIC | Age: 79
End: 2024-06-28
Payer: COMMERCIAL

## 2024-06-28 DIAGNOSIS — R26.81 UNSTEADINESS: ICD-10-CM

## 2024-06-28 DIAGNOSIS — M54.2 CERVICAL PAIN: ICD-10-CM

## 2024-06-28 DIAGNOSIS — D11.9 WARTHIN TUMOR: ICD-10-CM

## 2024-06-28 DIAGNOSIS — E11.42 TYPE 2 DIABETES MELLITUS WITH DIABETIC POLYNEUROPATHY, WITHOUT LONG-TERM CURRENT USE OF INSULIN (H): ICD-10-CM

## 2024-06-28 DIAGNOSIS — M26.609 TMJ (TEMPOROMANDIBULAR JOINT SYNDROME): Primary | ICD-10-CM

## 2024-06-28 PROCEDURE — 99214 OFFICE O/P EST MOD 30 MIN: CPT | Performed by: PHYSICIAN ASSISTANT

## 2024-06-28 RX ORDER — METHYLPREDNISOLONE 4 MG
TABLET, DOSE PACK ORAL
Qty: 21 TABLET | Refills: 0 | Status: CANCELLED | OUTPATIENT
Start: 2024-06-28

## 2024-06-28 NOTE — LETTER
6/28/2024      Pancho Najera JrNora  [address]  [place] [address]      Dear Colleague,    Thank you for referring your patient, Pancho Najera Jr., to the [facility]. Please see a copy of my visit note below.    Assessment & Plan    Benign exam today.  There was no generalized swelling of the parotid on his recent CAT scan and the Warthin's tumor is effectively stable.  There is no appreciable swelling, erythema or tenderness in the parotid today to make me suspect there is infection.  I suspect his pain primarily related to the TMJ and musculoskeletal neck pain.  Will refer to physical therapy as below.    Will refer to vestibular therapy for patient's general unsteadiness.  Good have my moments does not overtly appear to be vestibular today.      Problem List Items Addressed This Visit          Nervous and Auditory    Type 2 diabetes mellitus with diabetic polyneuropathy, without long-term current use of insulin (H)       Digestive    Warthin tumor     Other Visit Diagnoses       TMJ (temporomandibular joint syndrome)    -  Primary    Relevant Orders    Physical Therapy  Referral    Unsteadiness        Relevant Orders    Physical Therapy  Referral    Cervical pain        Relevant Orders    Physical Therapy  Referral             Review of external notes as documented elsewhere in note    34 minutes spent on the date of the encounter doing chart review, history and exam, documentation and further activities per the note  {     DARCY Shaikh  [facility]    Subjective     HPI        Per 5/15 PRIMARY CARE PROVIDER note:  ED/UC Followup:     Facility:  [facility] [place]  Date of visit: 05/01/2024  Reason for visit: Dizziness and Acute recurrent maxillary sinusitis  Current Status: has recurring neck swelling that gets better with antibiotics     Urgent care visit on 5/1   Swelling on the right  side of his face. Received Augmentin and swelling improved in a couple of days. Yet still persist. Dizziness improved with his antibiotic. In the past underwent parotid gland biopsy on 6/9/2022 by ENT. 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     6/15 neck CT:  IMPRESSION:    1. Compared to 8/20/2021, there has been slight increase in size of right parotid lesions. These may represent primary salivary gland tumors or enlarged lymph nodes.    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 Dr Alonso did not believe he was a good surgical candidate given location of the parotid mass. Tx'd 5/1 with augmentin for facial pain, swelling, swollen salivary gland vs lymph node vs sinusitis.     For past 6 months has been having increased facial swelling and pain on the RT.   The Augmentin helped.  Denies overt neck pain,    Recent A1c was 7.5      Chronically for years, sometimes gets unsteady if rolls over in bed, sometimes if bends over and looks up.   Walks with a cane.  Not vertiginous.  CT brain and CTA in 2022 was noncontributory.      Review of Systems   ENT as above      Objective    There were no vitals taken for this visit.    Physical Exam     Constitutional:   The patient was in no acute distress.      Head/Face:   Normocephalic and atraumatic.  No lesions or scars.     Ears:  The tympanic membranes are normal in appearance, bony landmarks are intact.  No retraction, perforation, or masses.   No fluid or purulence was seen in the external canal or the middle ear. No evidence of infection of the middle ear or external canal, cerumen was normal in appearance.    Nose:  Anterior rhinoscopy revealed midline septum and absence of purulence or polyps.      Mouth:  Normal tongue, floor of mouth, buccal mucosa, and palate.  No lesions, ulceration or  masses on inspection, normal voice quality  , Rt parotid nonttp w/o swelling or  masses, Stensen duct appears,   Oropharynx:  Normal mucosa, palate symmetric with normal elevation. Tonsils 1+.  Pterygoid region non-tender w/ 3+  crepitus b/l.  + mastoid TTP on rt at SCM attachment.      Neck:  Supple with normal laryngeal and tracheal landmarks. No palpable thyroid.     Lymphatic:  There is no palpable lymphadenopathy in the neck.       Neuro: Alert and oriented - normal speech. Cranial nerves 2-12 intact.  Normal strength. Using extremities freely    Balance evaluation -      Romberg +.  The patient was able to stand independently in the exam room with his cane.                    Again, thank you for allowing me to participate in the care of your patient.        Sincerely,        DARCY Shaikh

## 2024-06-28 NOTE — PATIENT INSTRUCTIONS
Temporomandibular Disorder: Care Instructions  Overview     Temporomandibular disorders (TMDs) are problems with the muscles and joints that connect your jaw to your skull. These problems cause pain when you talk, chew, swallow, or yawn. You may feel this pain on one or both sides.  TMDs are often caused by tight jaw muscles. The tightness can be caused by clenching or grinding your teeth.  Lowering stress may help relax your jaw and reduce your pain. Your doctor may suggest a dental splint. Splints can help protect the teeth from grinding and clenching.  You may be able to do some things at home to feel better. If that doesn't work for you, your doctor may prescribe medicine to help relax your muscles and control the pain.  Follow-up care is a key part of your treatment and safety. Be sure to make and go to all appointments, and call your doctor if you are having problems. It's also a good idea to know your test results and keep a list of the medicines you take.  How can you care for yourself at home?  Put an ice pack or a warm, moist cloth on your jaw for 15 minutes. Do this several times a day. Try switching back and forth between moist heat and cold.  Search for [facility] Youtube videos on TMJ exercises you can do at home.    Ask your doctor if you can take an over-the-counter pain medicine, such as acetaminophen (Tylenol), ibuprofen (Advil, Motrin), or naproxen (Aleve). Be safe with medicines. Read and follow all instructions on the label.  Choose softer foods, such as eggs, yogurt, soup, or pureed foods. Try to avoid hard foods. Cut food into small pieces.  If it doesn't cause pain, practice relaxing your jaw. Gently open and close your mouth. Move your jaw straight up and down. Do this for a few minutes every morning and evening. Watching yourself in a mirror can help.  Have good posture. Try to line up your ears, shoulders, and hips when sitting and standing.  Learn to manage your stress.  "Try:  Relaxation techniques. These may include taking slow, deep breaths, and mindful meditation. It may include progressive muscle relaxation, yoga, nidia chi, and qi gong.  Getting at least 30 minutes of exercise on most days to relieve stress. Try walking.  Try not to:  Hold a phone between your shoulder and your jaw.  Open your mouth all the way, like when you sing loudly or yawn.  Clench or grind your teeth, bite your lips, or chew your fingernails.  Clench things between your teeth, such as pens, pipes, or cigars.  When should you call for help?   Call your doctor now or seek immediate medical care if:    Your jaw is locked open or shut or it is hard to move your jaw.   Watch closely for changes in your health, and be sure to contact your doctor if:    Your jaw pain gets worse.     Your face is swollen.     You do not get better as expected.   Where can you learn more?  Go to https://www.[employer].net/patiented  Enter P868 in the search box to learn more about \"Temporomandibular Disorder: Care Instructions.\"  Current as of: August 6, 2023               Content Version: 14.0    5070-0751 [employer].   Care instructions adapted under license by your healthcare professional. If you have questions about a medical condition or this instruction, always ask your healthcare professional. [employer] disclaims any warranty or liability for your use of this information.       "

## 2024-07-02 ENCOUNTER — OFFICE VISIT (OUTPATIENT)
Dept: FAMILY MEDICINE | Facility: CLINIC | Age: 79
End: 2024-07-02
Payer: COMMERCIAL

## 2024-07-02 VITALS
RESPIRATION RATE: 16 BRPM | TEMPERATURE: 96.7 F | OXYGEN SATURATION: 95 % | HEIGHT: 68 IN | WEIGHT: 206.2 LBS | HEART RATE: 64 BPM | DIASTOLIC BLOOD PRESSURE: 78 MMHG | SYSTOLIC BLOOD PRESSURE: 140 MMHG | BODY MASS INDEX: 31.25 KG/M2

## 2024-07-02 DIAGNOSIS — G47.33 OSA (OBSTRUCTIVE SLEEP APNEA): ICD-10-CM

## 2024-07-02 DIAGNOSIS — K11.20 INFECTION OF PAROTID GLAND: Primary | ICD-10-CM

## 2024-07-02 PROCEDURE — 99213 OFFICE O/P EST LOW 20 MIN: CPT | Performed by: NURSE PRACTITIONER

## 2024-07-02 RX ORDER — RESPIRATORY SYNCYTIAL VIRUS VACCINE 120MCG/0.5
0.5 KIT INTRAMUSCULAR ONCE
Qty: 1 EACH | Refills: 0 | Status: CANCELLED | OUTPATIENT
Start: 2024-07-02 | End: 2024-07-02

## 2024-07-02 ASSESSMENT — PAIN SCALES - GENERAL: PAINLEVEL: NO PAIN (0)

## 2024-07-02 NOTE — PROGRESS NOTES
Assessment & Plan     Infection of parotid gland  Advised patient of finishing antibiotic regimens and close follow-up if not improving.  Also discussed antibiotic resistance.  I will restart the Augmentin for 7 days.  He was advised to complete all the doses and if not improving or recurring, I would like him to consider follow-up with ENT with a different provider.  - amoxicillin-clavulanate (AUGMENTIN) 875-125 MG tablet; Take 1 tablet by mouth 2 times daily    KACY (obstructive sleep apnea)  Referral placed with sleep clinic for new equipment.  - Adult Sleep Eval & Management  Referral; Future    See Patient Instructions    Subjective   Pancoh is a 79 year old, presenting for the following health issues:  Facial Swelling        7/2/2024    10:59 AM   Additional Questions   Roomed by rmb   Accompanied by spouse         7/2/2024    10:59 AM   Patient Reported Additional Medications   Patient reports taking the following new medications none     History of Present Illness       Reason for visit:  Swelling of my throat gland    He eats 0-1 servings of fruits and vegetables daily.He consumes 0 sweetened beverage(s) daily. He exercises with enough effort to increase his heart rate 3 or less days per week.   He is taking medications regularly.     Concern - had scan and wants antibiotic for face swelling   Onset: years   Description: swelling ,painful  Intensity: mild   Progression of Symptoms:  worsening  Accompanying Signs & Symptoms: goes into eyes and ear   Previous history of similar problem: years ago  Precipitating factors:        Worsened by: none  Alleviating factors:        Improved by: amoxicillin   Therapies tried and outcome:  none   Patient saw ENT and was very upset since he tried to diagnose him with TMJ and felt it was a waste of his time.  He states that when he was seen in urgent care they started antibiotics and it went away but then now came back after a couple weeks.  He does admit that  "when it felt better after 4 days of the antibiotic that he quit the antibiotic early.  He feels that the same symptoms are coming back. There is some swelling and redness on the right parotid gland with some tenderness.    Patient also would like to get referred to sleep clinic since his CPAP is not working and he needs new equipment.      Review of Systems  CONSTITUTIONAL: NEGATIVE for fever, chills, change in weight  ENT/MOUTH: POSITIVE for parotid gland swelling, redness  and tenderness on the right side   RESP: NEGATIVE for significant cough or SOB  CV: NEGATIVE for chest pain, palpitations or peripheral edema  PSYCHIATRIC: NEGATIVE for changes in mood or affect  ROS otherwise negative      Objective    BP (!) 140/78   Pulse 64   Temp (!) 96.7  F (35.9  C) (Tympanic)   Resp 16   Ht 1.727 m (5' 8\")   Wt 93.5 kg (206 lb 3.2 oz)   SpO2 95%   BMI 31.35 kg/m    Body mass index is 31.35 kg/m .  Physical Exam   GENERAL: alert and no distress  HENT: patient has increased swelling that is notable on the right parotid versus the left, there is mild tenderness on palpation with some redness noted as well and mild warmth.  PSYCH: mentation appears normal, affect normal/bright      Signed Electronically by: Shelia Hairston NP    "

## 2024-07-02 NOTE — PATIENT INSTRUCTIONS
Make appointment with sleep clinic to see if you can get your CPAP replaced.  Take Augmentin twice daily for 7 days for the parotid infection.  If not completely resolved, follow-up with me to extend your antibiotic.  Consider follow-up with a different ENT for evaluation.

## 2024-07-13 ENCOUNTER — TELEPHONE (OUTPATIENT)
Dept: FAMILY MEDICINE | Facility: CLINIC | Age: 79
End: 2024-07-13
Payer: COMMERCIAL

## 2024-07-13 DIAGNOSIS — K11.20 INFECTION OF PAROTID GLAND: ICD-10-CM

## 2024-07-17 NOTE — TELEPHONE ENCOUNTER
Forwarding status update and request for antibiotic refill to PCP for review please.    See Shelia Hairston's visit note dated 7/2/24 below, for infection of parotid gland.  RN reached out to patient to get status update before routing refill request to PCP.  Patient states his symptoms did improve with the Augmentin, and he was good for about a week, but now the last few days has noted some swelling and discomfort again.  It seems to be going to sinuses, and jaw.  For example if he eats something hard like pretzels or a cookie, has some jaw discomfort, almost like an abscess tooth feeling.  He has had some intermittent headaches as well, rated at 3-4/10.  He took Advil this morning.  We did review this generally isn't recommended due to his CKD.    He states he did see the dentist in May, and everything was fine with teeth.   He denies fever and is still able to swallow fine.    He has not followed with another ENT for second opinion yet and was recommended to call his insurance to see who else would be covered in his network.   In the meantime, will route to PCP to advise on another round of antibiotics, versus another clinic visit, versus follow-up with ENT for recurring symptoms.   Preferred pharmacy is our pharmacy.     Jewels Tan RN  [facility]      Take Augmentin twice daily for 7 days for the parotid infection.  If not completely resolved, follow-up with me to extend your antibiotic.  Consider follow-up with a different ENT for evaluation.

## 2024-07-18 RX ORDER — LEVOFLOXACIN 500 MG/1
500 TABLET, FILM COATED ORAL DAILY
Qty: 7 TABLET | Refills: 0 | Status: SHIPPED | OUTPATIENT
Start: 2024-07-18 | End: 2024-07-18

## 2024-07-18 NOTE — TELEPHONE ENCOUNTER
I reordered 1 week of antibiotics and please get him into ENT for further evaluation for second opinion.  Shelia Hairston NP on 7/18/2024 at 10:09 AM

## 2024-07-18 NOTE — TELEPHONE ENCOUNTER
Called patient but no answer and no way to leave message.  Will try again.    Tawanna Cox on 7/18/2024 at 10:44 AM

## 2024-10-03 DIAGNOSIS — E11.42 TYPE 2 DIABETES MELLITUS WITH DIABETIC POLYNEUROPATHY, WITHOUT LONG-TERM CURRENT USE OF INSULIN (H): ICD-10-CM

## 2024-10-03 RX ORDER — BLOOD SUGAR DIAGNOSTIC
STRIP MISCELLANEOUS
Qty: 100 STRIP | Refills: 2 | Status: SHIPPED | OUTPATIENT
Start: 2024-10-03

## 2024-11-01 ENCOUNTER — TRANSFERRED RECORDS (OUTPATIENT)
Dept: HEALTH INFORMATION MANAGEMENT | Facility: CLINIC | Age: 79
End: 2024-11-01
Payer: COMMERCIAL

## 2024-12-02 DIAGNOSIS — E11.42 TYPE 2 DIABETES MELLITUS WITH DIABETIC POLYNEUROPATHY, WITHOUT LONG-TERM CURRENT USE OF INSULIN (H): ICD-10-CM

## 2024-12-02 RX ORDER — GLIMEPIRIDE 4 MG/1
4 TABLET ORAL
Qty: 180 TABLET | Refills: 0 | Status: SHIPPED | OUTPATIENT
Start: 2024-12-02

## 2024-12-02 RX ORDER — ATORVASTATIN CALCIUM 10 MG/1
10 TABLET, FILM COATED ORAL DAILY
Qty: 90 TABLET | Refills: 0 | Status: SHIPPED | OUTPATIENT
Start: 2024-12-02

## 2024-12-02 NOTE — TELEPHONE ENCOUNTER
GFR Estimate   Date Value Ref Range Status   05/01/2024 64 >60 mL/min/1.73m2 Final   07/04/2017 61 >60 mL/min/1.7m2 Final     Comment:     Non  GFR Calc     GFR, ESTIMATED POCT   Date Value Ref Range Status   08/20/2021 53 (L) >60 mL/min/1.73m2 Final     Medication is being filled for 1 time refill only due to:  Patient needs to be seen because due for a diabetes visit.'Sandi Garnett RN on 12/2/2024 at 2:16 PM

## 2025-02-23 DIAGNOSIS — E11.42 TYPE 2 DIABETES MELLITUS WITH DIABETIC POLYNEUROPATHY, WITHOUT LONG-TERM CURRENT USE OF INSULIN (H): ICD-10-CM

## 2025-02-24 RX ORDER — ATORVASTATIN CALCIUM 10 MG/1
10 TABLET, FILM COATED ORAL DAILY
Qty: 30 TABLET | Refills: 0 | Status: SHIPPED | OUTPATIENT
Start: 2025-02-24

## 2025-04-07 DIAGNOSIS — E11.42 TYPE 2 DIABETES MELLITUS WITH DIABETIC POLYNEUROPATHY, WITHOUT LONG-TERM CURRENT USE OF INSULIN (H): ICD-10-CM

## 2025-04-08 RX ORDER — ATORVASTATIN CALCIUM 10 MG/1
10 TABLET, FILM COATED ORAL DAILY
Qty: 30 TABLET | Refills: 0 | Status: SHIPPED | OUTPATIENT
Start: 2025-04-08

## 2025-05-21 ENCOUNTER — TELEPHONE (OUTPATIENT)
Dept: FAMILY MEDICINE | Facility: CLINIC | Age: 80
End: 2025-05-21
Payer: COMMERCIAL

## 2025-05-21 NOTE — TELEPHONE ENCOUNTER
Patient Quality Outreach    Patient is due for the following:   Hypertension -  Hypertension follow-up visit    Action(s) Taken:   Pt to schedule    Type of outreach:    Phone, left message for patient/parent to call back.    Questions for provider review:    None         Marcel Gerardo  Chart routed to None.

## 2025-05-26 ENCOUNTER — TELEPHONE (OUTPATIENT)
Dept: FAMILY MEDICINE | Facility: CLINIC | Age: 80
End: 2025-05-26
Payer: COMMERCIAL

## 2025-05-26 DIAGNOSIS — E11.42 TYPE 2 DIABETES MELLITUS WITH DIABETIC POLYNEUROPATHY, WITHOUT LONG-TERM CURRENT USE OF INSULIN (H): ICD-10-CM

## 2025-05-27 NOTE — TELEPHONE ENCOUNTER
See refill request, and PCP message from other encounter below; please call pt to schedule.    Shelia Hairston NP  MS    5/1/25  7:47 AM  Note  Please contact patient and let him know that I refilled his cholesterol medication for a month.  He needs to make a clinic appointment for refills and recheck.  Shelia Hairston NP on 5/1/2025 at 7:47 AM          Marie Chau RN  [facility]

## 2025-05-28 NOTE — TELEPHONE ENCOUNTER
Called pt, no answer, unable to leave message due to know mailbox set up. Will postpone to try again    Eula Shrestha on 5/28/2025 at 8:30 AM

## 2025-05-29 RX ORDER — ATORVASTATIN CALCIUM 10 MG/1
TABLET, FILM COATED ORAL
Qty: 30 TABLET | Refills: 0 | OUTPATIENT
Start: 2025-05-29

## 2025-05-29 NOTE — TELEPHONE ENCOUNTER
Tried to call patient again.  No answer and no way to leave message.      Tawanna Huitron on 5/29/2025 at 11:18 AM

## 2025-06-16 DIAGNOSIS — E11.42 TYPE 2 DIABETES MELLITUS WITH DIABETIC POLYNEUROPATHY, WITHOUT LONG-TERM CURRENT USE OF INSULIN (H): ICD-10-CM

## 2025-06-16 RX ORDER — ATORVASTATIN CALCIUM 10 MG/1
TABLET, FILM COATED ORAL
Qty: 30 TABLET | Refills: 0 | Status: SHIPPED | OUTPATIENT
Start: 2025-06-16

## 2025-06-16 NOTE — TELEPHONE ENCOUNTER
Patient states his insurance is no longer being taken here ,so he does not have a primary provider and can not make an appointment with you

## 2025-06-16 NOTE — TELEPHONE ENCOUNTER
Please call patient and let him know that he is due for medication refill appointment.  I will refill his cholesterol medication for a month so he has time to get in since he is due for labs.  Shelia Hairston NP on 6/16/2025 at 4:31 PM

## 2025-07-08 DIAGNOSIS — E11.42 TYPE 2 DIABETES MELLITUS WITH DIABETIC POLYNEUROPATHY, WITHOUT LONG-TERM CURRENT USE OF INSULIN (H): ICD-10-CM

## 2025-07-08 RX ORDER — ATORVASTATIN CALCIUM 10 MG/1
TABLET, FILM COATED ORAL
Qty: 30 TABLET | Refills: 0 | Status: SHIPPED | OUTPATIENT
Start: 2025-07-08

## 2025-09-03 DIAGNOSIS — E11.42 TYPE 2 DIABETES MELLITUS WITH DIABETIC POLYNEUROPATHY, WITHOUT LONG-TERM CURRENT USE OF INSULIN (H): ICD-10-CM

## 2025-09-04 RX ORDER — ATORVASTATIN CALCIUM 10 MG/1
TABLET, FILM COATED ORAL
Qty: 30 TABLET | Refills: 0 | OUTPATIENT
Start: 2025-09-04